# Patient Record
Sex: MALE | Race: BLACK OR AFRICAN AMERICAN | Employment: FULL TIME | URBAN - METROPOLITAN AREA
[De-identification: names, ages, dates, MRNs, and addresses within clinical notes are randomized per-mention and may not be internally consistent; named-entity substitution may affect disease eponyms.]

---

## 2017-11-16 ENCOUNTER — ED HISTORICAL/CONVERTED ENCOUNTER (OUTPATIENT)
Dept: OTHER | Age: 32
End: 2017-11-16

## 2017-12-12 ENCOUNTER — HOSPITAL ENCOUNTER (OUTPATIENT)
Dept: GENERAL RADIOLOGY | Age: 32
Discharge: HOME | End: 2017-12-12
Attending: INTERNAL MEDICINE
Payer: SUBSIDIZED

## 2017-12-12 DIAGNOSIS — K59.00 OBSTIPATION: ICD-10-CM

## 2017-12-12 DIAGNOSIS — K21.9 GERD (GASTROESOPHAGEAL REFLUX DISEASE): ICD-10-CM

## 2017-12-12 PROCEDURE — 74241 XR UPPER GI SERIES W KUB: CPT

## 2018-05-02 ENCOUNTER — HOSPITAL ENCOUNTER (OUTPATIENT)
Dept: GENERAL RADIOLOGY | Age: 33
Discharge: HOME | End: 2018-05-02
Attending: INTERNAL MEDICINE
Payer: SUBSIDIZED

## 2018-05-02 DIAGNOSIS — K59.00 OBSTIPATION: ICD-10-CM

## 2018-05-02 PROCEDURE — 74018 RADEX ABDOMEN 1 VIEW: CPT

## 2018-05-21 NOTE — H&P
Date: 2018 2:45 PM   Patient Name: Natali Lynne. Account #: [id number]    Gender: Male    (age): 1985 (32)       Provider:     Nahomy Webb MD        Referring Physician:     Ivan Saravia  [address], [place], [address]  [phone] (phone)  [phone] (fax)        Chief Complaint: abnormal/increased LFT's           History of Present Illness: The patient is seen for the evaluation of abnormal serum liver testing. Abnormal liver enzyme values were initially identified during the evaluation of related symptoms 2 months ago. The lab values were as follows: SGOT = 60, SGPT = 76, T. Bilirubin = 0.7, Albumin = 4.6. Pertinent symptoms reported by the patient include testicular pain. Finds difficult still to produce bowel movement in spite of daily PEG 3350 use. New complaint testicular pain       Told on follow up testing, liver enzymes back to normal      Having variable amounts of BRBPR with difficult to produce bowel movements? The patient is seen for the evaluation of abnormal serum liver testing. ? Abnormal liver enzyme values were initially identified ? during the evaluation of related symptoms? ? ? ?2? ? months? ago? Lucy Gardner The lab values were as follows: ? SGOT = ?60? ? ?, SGPT = ?76? ? ?, ALK Phos. = ?[ALK Phos. (units/ml. )]? ? ?, T. Bilirubin = ?0.7? , ? ? GGT = ?[GGT units/ml]? ? ?Albumin = ?4.6? ? ? Prothrombin time (INR) =  ?[Prothrombin time (INR)]? ?.? Pertinent symptoms reported by the patient include ? testicular pain? . ? ? The patient identifies ? [Hepatitis risk factors]? as increasing the risk for contraction of hepatitis. ? ? Additional notable medical history includes ? [Abnormal LFT associated conditions]? .? ? To this point, evaluation of the patient's lab abnormalities has included ? [Prior liver tests]? . ? Finds difficult still to produce bowel movement in spite of daily PEG 3350 use. New complaint testicular pain?        Told on follow up testing, liver enzymes back to normal      Having variable amounts of BRBPR with difficult to produce bowel movements      Past Medical History      Medical Conditions: No Known Conditions  depression   Surgical Procedures: No Prior Procedures   Dx Studies: Colonoscopy  Hemoccult cards, 12/26/2017  KUB, 12/12/2017   Medications: dicyclomine 20 mg TAKE ONE TABLET BY MOUTH FOUR TIMES A DAY FOR 7 DAYS  Gas Relief 80 mg  IBgard 90 mg  omeprazole 20 mg  polyethylene glycol 3350 17 gram/dose Take 17 gram dissolved in water once a day for 30 days   Allergies: Patient has no known allergies or drug allergies   Immunizations: No Immunizations      Social History      Alcohol: None   Tobacco: Former smoker   Drugs: Former cocaine / marijuana. Exercise: None   Caffeine: None   Marital Status:          Occupation:               Family History No history of Colon Cancer, Colon Polyps, Esophogeal Cancer, GI Cancers, IBD (Crohn's or UC), Liver disease  Other: Diagnosed with Primary malignant neoplasm of prostate; Father: Diagnosed with Cirrhosis of liver;       Review of Systems:   Cardiovascular: Denies chest pain, irregular heart beat, palpitations, peripheral edema, syncope, Sweats. Constitutional: Presents suffers from fatigue. Denies fever, loss of appetite, weight gain, weight loss. ENMT: Denies nose bleeds, sore throat, hearing loss. Endocrine: Denies excessive thirst, heat intolerance. Eyes: Denies loss of vision. Gastrointestinal: Presents suffers from abdominal pain, abdominal swelling, change in bowel habits, constipation, diarrhea, Bloating/gas, nausea, rectal bleeding, stomach cramps, rectal pain. Denies heartburn, jaundice, vomiting, dysphagia, Stool incontinence, hematemesis. Genitourinary: Denies dark urine, dysuria, frequent urination, hematuria, incontinence. Hematologic/Lymphatic: Denies easy bruising, prolonged bleeding. Integumentary: Denies itching, rashes, sun sensitivity.    Musculoskeletal: Presents suffers from back pain, muscle weakness. Denies arthritis, gout, joint pain, stiffness. Neurological: Presents suffers from dizziness, frequent headaches. Denies fainting, memory loss. Psychiatric: Denies anxiety, depression, difficulty sleeping, hallucinations, nervousness, panic attacks, paranoia. Respiratory: Presents suffers from dyspnea. Denies cough, wheezing. Vital Signs:   BP  (mmHg)  Pulse  (ppm) Weight (lbs/oz) Height (ft/in) BMI Resp/min Temp   117/84 76 214 / 4 5 / 7 33.55 13 98.3 (F)      Physical Exam:   Constitutional:      Appearance: No distress, appears comfortable. Skin:      Inspection: No rash, no jaundice. Head/face: Inspection: Normacephalic, atraumatic. Eyes:      Conjunctivae/lids: Normal.   ENMT:      External: Normal.   Neck:      Neck: Normal appearance, trachea midline. Respiratory:      Effort: Normal respiratory effort, comfortable, speaks in complete sentences. Auscultation: normal breath sounds, no rubs, wheezes or rhonchi. Cardiovascular: Auscultation: normal, S1 and S2, no gallops , no rubs or murmurs . Gastrointestinal/Abdomen:      Abdomen: non-distended, nontender. Liver/Spleen: normal, normal size, Liver size and consistency normal, spleen is non-palpable. Rectal: no fissure, normal testes. Musculoskeletal:      Gait/station: normal.   Muscles: normal strength and tone, no atrophy or abnormal movements. Psychiatric:      Judgment/insight: Normal, normal judgement, normal insight. Mood and affect: Normal mood, affect full, no evidence of depression, anxiety or agitation. Lymphatic:      Neck: No lymphadenopathy in the cervical or supraclavicular chain. Lab Results: No Electronic Results      Impressions: Obstipation? ?Obstipation?          Assessment: ?         Plan: Barium Enema  Renew polyethylene glycol 3350 17 gram/dose Take 17 gram dissolved in water twice a day for 30 days  bisacodyl 5 mg Take 2 tablet by mouth once a day for 30 days? ?Barium Enema? ? ? Renew ?polyethylene glycol 3350?17 gram/dose? Take 17 gram dissolved in water twice a day for 30 days? ?  ? ?bisacodyl? ?5 mg? ? Take 2 tablet by mouth once a day for 30 days? ? Risk & Medical Necessity: The patient requires Low to Moderate Severity care for this visit. Diagnosis and management options are Minimal. The amount of data reviewed and/or ordered is Minimal/None.  The level of risk is Minimal.           Notes:              Linda Joseph MD     Electronically signed on 2018 3:48:07 PM by MD Mavis James, MRN [id number],  1985 Follow Up, 2018

## 2018-05-22 ENCOUNTER — HOSPITAL ENCOUNTER (OUTPATIENT)
Age: 33
Setting detail: OUTPATIENT SURGERY
Discharge: HOME | End: 2018-05-22
Attending: SPECIALIST | Admitting: SPECIALIST
Payer: SUBSIDIZED

## 2018-05-22 ENCOUNTER — ANESTHESIA EVENT (OUTPATIENT)
Dept: ENDOSCOPY | Age: 33
End: 2018-05-22
Payer: SUBSIDIZED

## 2018-05-22 ENCOUNTER — ANESTHESIA (OUTPATIENT)
Dept: ENDOSCOPY | Age: 33
End: 2018-05-22
Payer: SUBSIDIZED

## 2018-05-22 VITALS
OXYGEN SATURATION: 100 % | WEIGHT: 209 LBS | RESPIRATION RATE: 16 BRPM | DIASTOLIC BLOOD PRESSURE: 81 MMHG | SYSTOLIC BLOOD PRESSURE: 118 MMHG | HEIGHT: 67 IN | HEART RATE: 71 BPM | BODY MASS INDEX: 32.8 KG/M2 | TEMPERATURE: 98.2 F

## 2018-05-22 PROCEDURE — 76040000019: Performed by: SPECIALIST

## 2018-05-22 PROCEDURE — 74011250636 HC RX REV CODE- 250/636: Performed by: PHYSICIAN ASSISTANT

## 2018-05-22 PROCEDURE — 74011000258 HC RX REV CODE- 258

## 2018-05-22 PROCEDURE — 76060000031 HC ANESTHESIA FIRST 0.5 HR: Performed by: SPECIALIST

## 2018-05-22 PROCEDURE — 74011000250 HC RX REV CODE- 250

## 2018-05-22 PROCEDURE — 74011250637 HC RX REV CODE- 250/637: Performed by: PHYSICIAN ASSISTANT

## 2018-05-22 PROCEDURE — 74011250636 HC RX REV CODE- 250/636

## 2018-05-22 RX ORDER — NALOXONE HYDROCHLORIDE 0.4 MG/ML
0.4 INJECTION, SOLUTION INTRAMUSCULAR; INTRAVENOUS; SUBCUTANEOUS
Status: DISCONTINUED | OUTPATIENT
Start: 2018-05-22 | End: 2018-05-22 | Stop reason: HOSPADM

## 2018-05-22 RX ORDER — PROPOFOL 10 MG/ML
INJECTION, EMULSION INTRAVENOUS AS NEEDED
Status: DISCONTINUED | OUTPATIENT
Start: 2018-05-22 | End: 2018-05-22 | Stop reason: HOSPADM

## 2018-05-22 RX ORDER — LIDOCAINE HYDROCHLORIDE 20 MG/ML
INJECTION, SOLUTION EPIDURAL; INFILTRATION; INTRACAUDAL; PERINEURAL AS NEEDED
Status: DISCONTINUED | OUTPATIENT
Start: 2018-05-22 | End: 2018-05-22 | Stop reason: HOSPADM

## 2018-05-22 RX ORDER — DEXTROMETHORPHAN/PSEUDOEPHED 2.5-7.5/.8
1.2 DROPS ORAL
Status: DISCONTINUED | OUTPATIENT
Start: 2018-05-22 | End: 2018-05-22 | Stop reason: HOSPADM

## 2018-05-22 RX ORDER — POLYETHYLENE GLYCOL 3350 17 G/17G
17 POWDER, FOR SOLUTION ORAL DAILY
COMMUNITY
End: 2018-05-22

## 2018-05-22 RX ORDER — EPINEPHRINE 0.1 MG/ML
1 INJECTION INTRACARDIAC; INTRAVENOUS
Status: DISCONTINUED | OUTPATIENT
Start: 2018-05-22 | End: 2018-05-22 | Stop reason: HOSPADM

## 2018-05-22 RX ORDER — DICYCLOMINE HYDROCHLORIDE 10 MG/1
10 CAPSULE ORAL
COMMUNITY
End: 2020-09-24 | Stop reason: ALTCHOICE

## 2018-05-22 RX ORDER — SODIUM CHLORIDE 9 MG/ML
INJECTION, SOLUTION INTRAVENOUS
Status: DISCONTINUED | OUTPATIENT
Start: 2018-05-22 | End: 2018-05-22 | Stop reason: HOSPADM

## 2018-05-22 RX ORDER — SODIUM CHLORIDE 9 MG/ML
50 INJECTION, SOLUTION INTRAVENOUS CONTINUOUS
Status: DISCONTINUED | OUTPATIENT
Start: 2018-05-22 | End: 2018-05-22 | Stop reason: HOSPADM

## 2018-05-22 RX ORDER — MIDAZOLAM HYDROCHLORIDE 1 MG/ML
.25-5 INJECTION, SOLUTION INTRAMUSCULAR; INTRAVENOUS AS NEEDED
Status: DISCONTINUED | OUTPATIENT
Start: 2018-05-22 | End: 2018-05-22 | Stop reason: HOSPADM

## 2018-05-22 RX ORDER — FENTANYL CITRATE 50 UG/ML
25 INJECTION, SOLUTION INTRAMUSCULAR; INTRAVENOUS AS NEEDED
Status: DISCONTINUED | OUTPATIENT
Start: 2018-05-22 | End: 2018-05-22 | Stop reason: HOSPADM

## 2018-05-22 RX ORDER — PROPOFOL 10 MG/ML
INJECTION, EMULSION INTRAVENOUS
Status: DISCONTINUED | OUTPATIENT
Start: 2018-05-22 | End: 2018-05-22 | Stop reason: HOSPADM

## 2018-05-22 RX ORDER — ATROPINE SULFATE 0.1 MG/ML
0.5 INJECTION INTRAVENOUS
Status: DISCONTINUED | OUTPATIENT
Start: 2018-05-22 | End: 2018-05-22 | Stop reason: HOSPADM

## 2018-05-22 RX ORDER — FLUMAZENIL 0.1 MG/ML
0.2 INJECTION INTRAVENOUS
Status: DISCONTINUED | OUTPATIENT
Start: 2018-05-22 | End: 2018-05-22 | Stop reason: HOSPADM

## 2018-05-22 RX ADMIN — SODIUM CHLORIDE 50 ML/HR: 900 INJECTION, SOLUTION INTRAVENOUS at 07:55

## 2018-05-22 RX ADMIN — PROPOFOL 100 MG: 10 INJECTION, EMULSION INTRAVENOUS at 09:08

## 2018-05-22 RX ADMIN — LIDOCAINE HYDROCHLORIDE 50 MG: 20 INJECTION, SOLUTION EPIDURAL; INFILTRATION; INTRACAUDAL; PERINEURAL at 09:08

## 2018-05-22 RX ADMIN — SIMETHICONE 80 MG: 20 SUSPENSION/ DROPS ORAL at 09:14

## 2018-05-22 RX ADMIN — SODIUM CHLORIDE: 9 INJECTION, SOLUTION INTRAVENOUS at 08:59

## 2018-05-22 RX ADMIN — PROPOFOL 50 MG: 10 INJECTION, EMULSION INTRAVENOUS at 09:10

## 2018-05-22 RX ADMIN — PROPOFOL 150 MCG/KG/MIN: 10 INJECTION, EMULSION INTRAVENOUS at 09:08

## 2018-05-22 NOTE — ANESTHESIA PREPROCEDURE EVALUATION
Anesthetic History   No history of anesthetic complications            Review of Systems / Medical History  Patient summary reviewed, nursing notes reviewed and pertinent labs reviewed    Pulmonary  Within defined limits                 Neuro/Psych   Within defined limits           Cardiovascular  Within defined limits                Exercise tolerance: >4 METS  Comments: Not on beta blocker   GI/Hepatic/Renal     GERD (Otc meds work): well controlled           Endo/Other  Within defined limits           Other Findings   Comments: Blood in stool and abdominal pain (out of work since Nov)           Physical Exam    Airway  Mallampati: II  TM Distance: 4 - 6 cm  Neck ROM: normal range of motion   Mouth opening: Normal     Cardiovascular  Regular rate and rhythm,  S1 and S2 normal,  no murmur, click, rub, or gallop  Rhythm: regular  Rate: normal         Dental  No notable dental hx       Pulmonary  Breath sounds clear to auscultation               Abdominal  GI exam deferred       Other Findings            Anesthetic Plan    ASA: 1  Anesthesia type: MAC            Anesthetic plan and risks discussed with: Patient

## 2018-05-22 NOTE — DISCHARGE INSTRUCTIONS
[facility] KENDY Marie MD  [phone]      May 22, 2018    Zach Lebron  YOB: 1985    COLONOSCOPY DISCHARGE INSTRUCTIONS    If there is redness at IV site you should apply warm compress to area. If redness or soreness persist contact Dr. Jorge Marie' or your primary care doctor. There may be a slight amount of blood passed from the rectum. Gaseous discomfort may develop, but walking, belching will help relieve this. You may not operate a vehicle for 12 hours  You may not operate machinery or dangerous appliances for rest of today  You may not drink alcoholic beverages for 12 hours  Avoid making any critical decisions for 24 hours    DIET:  You may resume your normal diet, but some patients find that heavy or large meals may lead to indigestion or vomiting. I suggest a light meal as first food intake. MEDICATIONS:  The use of some over-the-counter pain medication may lead to bleeding after colon biopsies or polyp removal.  Tylenol (also called acetaminophen) is safe to take even if you have had colonoscopy with polyp removal.  Based on the procedure you had today you may safely take aspirin or aspirin-like products for the next ten (10) days. Remember that Tylenol (also called acetaminophen) is safe to take after colonoscopy even if you have had biopsies or polyps removed. ACTIVITY:  You may resume your normal household activities, but it is recommended that you spend the remainder of the day resting -  avoid any strenuous activity.     CALL DR. Esperanza Rodríguez' OFFICE IF:  Increasing pain, nausea, vomiting  Abdominal distension (swelling)  Significant new or increased bleeding (oral or rectal)  Fever/Chills  Chest pain/shortness of breath                       Additional instructions:   Normal colonoscopy aside from small hemorrhoids  Will get a CT unless that's already been done  You should take fiber (Not miralax) and continue the bentyl medication for pain. It was an honor to be your doctor today. Please let me or my office staff know if you have any feedback about today's procedure. Mandy Dickerson MD    Colonoscopy saves lives, and can prevent colon cancer. Everyone aged 48 or older needs colonoscopy.   Tell your family and friends: get the test!

## 2018-05-22 NOTE — IP AVS SNAPSHOT
Summary of Care Report The Summary of Care report has been created to help improve care coordination. Users with access to [employer] or [address] (Web-based application) may access additional patient information including the Discharge Summary. If you are not currently a [address] user and need more information, please call the number listed below in the [place] [address] section and ask to be connected with Medical Records. Facility Information Name Address Phone [address] [id number] [phone] Patient Information Patient Name Sex  Josiah Robert ([id number]) Male 1985 Discharge Information Admitting Provider Service Area Unit Anu Pederson MD / [facility] Endoscopy / [phone] Discharge Provider Discharge Date/Time Discharge Disposition Destination (none) (none) (none) (none) Patient Language Language ENGLISH [13] You are allergic to the following No active allergies Current Discharge Medication List  
  
START taking these medications Dose & Instructions Dispensing Information Comments  
 psyllium packet Commonly known as:  METAMUCIL Dose:  1 Packet Take 1 Packet by mouth daily. Quantity:  90 Packet Refills:  3 CONTINUE these medications which have NOT CHANGED Dose & Instructions Dispensing Information Comments BENTYL 10 mg capsule Generic drug:  dicyclomine Dose:  10 mg Take 10 mg by mouth 4 times daily (before meals and nightly). Refills:  0  
   
 docusate sodium 50 mg capsule Commonly known as:  Sharon Booze Dose:  50 mg Take 50 mg by mouth two (2) times a day. Refills:  0 STOP taking these medications Comments MIRALAX 17 gram packet Generic drug:  polyethylene glycol Surgery Information ID Date/Time Status Primary Surgeon All Procedures Location [id number] 5/22/2018 0830 Unposted Kaitlynn Ruiz MD COLONOSCOPY [facility] ENDOSCOPY Follow-up Information None Discharge Instructions [address] Ulices Mariscal, [address] [place] [address] 
[phone] May 22, 2018 [facility] YOB: 1985 COLONOSCOPY DISCHARGE INSTRUCTIONS If there is redness at IV site you should apply warm compress to area. If redness or soreness persist contact Dr. Ulices Mariscal' or your primary care doctor. There may be a slight amount of blood passed from the rectum. Gaseous discomfort may develop, but walking, belching will help relieve this. You may not operate a vehicle for 12 hours You may not operate machinery or dangerous appliances for rest of today You may not drink alcoholic beverages for 12 hours Avoid making any critical decisions for 24 hours DIET: 
You may resume your normal diet, but some patients find that heavy or large meals may lead to indigestion or vomiting. I suggest a light meal as first food intake. MEDICATIONS: 
The use of some over-the-counter pain medication may lead to bleeding after colon biopsies or polyp removal.  Tylenol (also called acetaminophen) is safe to take even if you have had colonoscopy with polyp removal.  Based on the procedure you had today you may safely take aspirin or aspirin-like products for the next ten (10) days. Remember that Tylenol (also called acetaminophen) is safe to take after colonoscopy even if you have had biopsies or polyps removed. ACTIVITY: 
You may resume your normal household activities, but it is recommended that you spend the remainder of the day resting -  avoid any strenuous activity. CALL DR. Latrice Alfonso' OFFICE IF: Increasing pain, nausea, vomiting Abdominal distension (swelling) Significant new or increased bleeding (oral or rectal) Fever/Chills Chest pain/shortness of breath Additional instructions:  
Normal colonoscopy aside from small hemorrhoids Will get a CT unless that's already been done You should take fiber (Not miralax) and continue the bentyl medication for pain. It was an honor to be your doctor today. Please let me or my office staff know if you have any feedback about today's procedure. Charla Stovall MD 
 
Colonoscopy saves lives, and can prevent colon cancer. Everyone aged 48 or older needs colonoscopy. Tell your family and friends: get the test! 
 
 
 
 
Chart Review Routing History No Routing History on File

## 2018-05-22 NOTE — IP AVS SNAPSHOT
[address] [facility] [address] [phone] Patient: Rita Munguia MRN: [id number] ZYQ:[id number] About your hospitalization You were admitted on:  May 22, 2018 You last received care in the:  [facility] ENDOSCOPY You were discharged on:  May 22, 2018 Why you were hospitalized Your primary diagnosis was:  Not on File Follow-up Information None Discharge Orders None A check swathi indicates which time of day the medication should be taken. My Medications START taking these medications Instructions Each Dose to Equal  
 Morning Noon Evening Bedtime  
 psyllium packet Commonly known as:  METAMUCIL Your last dose was: Your next dose is: Take 1 Packet by mouth daily. 1 Packet CONTINUE taking these medications Instructions Each Dose to Equal  
 Morning Noon Evening Bedtime BENTYL 10 mg capsule Generic drug:  dicyclomine Your last dose was: Your next dose is: Take 10 mg by mouth 4 times daily (before meals and nightly). 10 mg  
    
   
   
   
  
 docusate sodium 50 mg capsule Commonly known as:  Lisa Busing Your last dose was: Your next dose is: Take 50 mg by mouth two (2) times a day. 50 mg  
    
   
   
   
  
  
STOP taking these medications MIRALAX 17 gram packet Generic drug:  polyethylene glycol Where to Get Your Medications Information on where to get these meds will be given to you by the nurse or doctor. ! Ask your nurse or doctor about these medications  
  psyllium packet Discharge Instructions [address] 
[phone] May 22, 2018 Rita Munguia YOB: 1985 COLONOSCOPY DISCHARGE INSTRUCTIONS If there is redness at IV site you should apply warm compress to area. If redness or soreness persist contact Dr. Vladimir Llamas' or your primary care doctor. There may be a slight amount of blood passed from the rectum. Gaseous discomfort may develop, but walking, belching will help relieve this. You may not operate a vehicle for 12 hours You may not operate machinery or dangerous appliances for rest of today You may not drink alcoholic beverages for 12 hours Avoid making any critical decisions for 24 hours DIET: 
You may resume your normal diet, but some patients find that heavy or large meals may lead to indigestion or vomiting. I suggest a light meal as first food intake. MEDICATIONS: 
The use of some over-the-counter pain medication may lead to bleeding after colon biopsies or polyp removal.  Tylenol (also called acetaminophen) is safe to take even if you have had colonoscopy with polyp removal.  Based on the procedure you had today you may safely take aspirin or aspirin-like products for the next ten (10) days. Remember that Tylenol (also called acetaminophen) is safe to take after colonoscopy even if you have had biopsies or polyps removed. ACTIVITY: 
You may resume your normal household activities, but it is recommended that you spend the remainder of the day resting -  avoid any strenuous activity. CALL DR. Ирина Lozano' OFFICE IF: Increasing pain, nausea, vomiting Abdominal distension (swelling) Significant new or increased bleeding (oral or rectal) Fever/Chills Chest pain/shortness of breath Additional instructions:  
Normal colonoscopy aside from small hemorrhoids Will get a CT unless that's already been done You should take fiber (Not miralax) and continue the bentyl medication for pain. It was an honor to be your doctor today. Please let me or my office staff know if you have any feedback about today's procedure. Jadon Velazquez MD 
 
Colonoscopy saves lives, and can prevent colon cancer. Everyone aged 48 or older needs colonoscopy. Tell your family and friends: get the test! 
 
 
 
 
  
  
  
Introducing [employer] 24/7 As a [employer] patient, I wanted to make you aware of our electronic visit tool called David Landrum. [employer] 24/7 allows you to connect within minutes with a medical provider 24 hours a day, seven days a week via a mobile device or tablet or logging into a secure website from your computer. You can access David Malloryfin from anywhere in the [place]. A virtual visit might be right for you when you have a simple condition and feel like you just dont want to get out of bed, or cant get away from work for an appointment, when your regular [employer] provider is not available (evenings, weekends or holidays), or when youre out of town and need minor care. Electronic visits cost only $49 and if the [employer] 24/7 provider determines a prescription is needed to treat your condition, one can be electronically transmitted to a nearby pharmacy*. Please take a moment to enroll today if you have not already done so. The enrollment process is free and takes just a few minutes. To enroll, please download the [employer] 24/7 sharyn to your tablet or phone, or visit [email or web address] to enroll on your computer. And, as an [address] [place] [address] patient with a [employer] account, the results of your visits will be scanned into your electronic medical record and your primary care provider will be able to view the scanned results. We urge you to continue to see your regular [employer] provider for your ongoing medical care. And while your primary care provider may not be the one available when you seek a David Landrum virtual visit, the peace of mind you get from getting a real diagnosis real time can be priceless. For more information on David Landrum, view our Frequently Asked Questions (FAQs) at [email or web address]. Sincerely, 
 
Shannon Boyce MD 
Chief Medical Officer [address] *:  certain medications cannot be prescribed via David Landrum Providers Seen During Your Hospitalization Provider Specialty Primary office phone Shanelle Vaughan MD Gastroenterology [phone] Your Primary Care Physician (PCP) Primary Care Physician Office Phone Office Fax NONE ** None ** ** None ** You are allergic to the following No active allergies Recent Documentation Height Weight BMI Smoking Status 1.702 m 94.8 kg 32.73 kg/m2 Former Smoker Emergency Contacts Name Discharge Info Relation Home Work Mobile Cassie Celestin DISCHARGE CAREGIVER [3] Spouse [3] [phone] Patient Belongings The following personal items are in your possession at time of discharge: 
  Dental Appliances: None  Visual Aid: None Please provide this summary of care documentation to your next provider. Signatures-by signing, you are acknowledging that this After Visit Summary has been reviewed with you and you have received a copy. Patient Signature:  ____________________________________________________________ Date:  ____________________________________________________________  
  
Jonas Cart Provider Signature:  ____________________________________________________________ Date:  ____________________________________________________________

## 2018-05-22 NOTE — INTERVAL H&P NOTE
Pre-Endoscopy H&P Update  Chief complaint/HPI/ROS:  The indication for the procedure, the patient's history and the patient's current medications are reviewed prior to the procedure and that data is reported on the H&P to which this document is attached. Any significant complaints with regard to organ systems will be noted, and if not mentioned then a review of systems is not contributory. History reviewed. No pertinent past medical history. Past Surgical History:   Procedure Laterality Date    HX OTHER SURGICAL      biopsy taken of left arm infection/     Social   Social History   Substance Use Topics    Smoking status: Former Smoker     Quit date: 2015    Smokeless tobacco: Never Used    Alcohol use No      Family History   Problem Relation Age of Onset    Heart Disease Mother       No Known Allergies   Prior to Admission Medications   Prescriptions Last Dose Informant Patient Reported? Taking?   dicyclomine (BENTYL) 10 mg capsule 5/21/2018  Yes Yes   Sig: Take 10 mg by mouth 4 times daily (before meals and nightly). docusate sodium (COLACE) 50 mg capsule 5/21/2018 at Unknown time  Yes Yes   Sig: Take 50 mg by mouth two (2) times a day. polyethylene glycol (MIRALAX) 17 gram packet 5/21/2018 at Unknown time  Yes Yes   Sig: Take 17 g by mouth daily. Facility-Administered Medications: None       PHYSICAL EXAM:  The patient is examined immediately prior to the procedure. Visit Vitals    /72    Pulse 78    Temp 97.8 °F (36.6 °C)    Resp 18    Ht 5' 7\" (1.702 m)    Wt 94.8 kg (209 lb)    SpO2 99%    BMI 32.73 kg/m2     Gen: Appears comfortable, no distress. Pulm: comfortable respirations with no abnormal audible breath sounds  CV: heart regular, well perfused  GI: abdomen flat. PLAN:  Informed consent discussion held, patient afforded an opportunity to ask questions and all questions answered.   After being advised of the risks, benefits, and alternatives, the patient requested that we proceed and indicated so on a written consent form. Will proceed with procedure as planned.   Cinthya Feng MD

## 2018-05-22 NOTE — ROUTINE PROCESS
Kristopher Saleh  1985  [id number]    Situation:  Verbal report received from: Iris Killian RN  Procedure: Procedure(s):  COLONOSCOPY    Background:    Preoperative diagnosis: CONSTIPATION  Postoperative diagnosis: hemorroids    :  Dr. Romana Jones  Assistant(s): Endoscopy Technician-1: Behzad Venegas  Endoscopy RN-1: Iris Killian RN    Specimens: * No specimens in log *  H. Pylori  no    Assessment:  Intra-procedure medications   Anesthesia gave intra-procedure sedation and medications, see anesthesia flow sheet yes    Intravenous fluids: NS@ KVO     Vital signs stable     Abdominal assessment: round and soft     Recommendation:  Discharge patient per MD order.   Family or Friend   Permission to share finding with family or friend yes

## 2018-05-22 NOTE — ANESTHESIA POSTPROCEDURE EVALUATION
Post-Anesthesia Evaluation and Assessment    Patient: Reece Almonte MRN: [id number]  SSN: [id number]    YOB: 1985  Age: 29 y.o. Sex: male       Cardiovascular Function/Vital Signs  Visit Vitals    /67    Pulse 85    Temp 36.8 °C (98.2 °F)    Resp 21    Ht 5' 7\" (1.702 m)    Wt 94.8 kg (209 lb)    SpO2 98%    BMI 32.73 kg/m2       Patient is status post MAC anesthesia for Procedure(s):  COLONOSCOPY. Nausea/Vomiting: None    Postoperative hydration reviewed and adequate. Pain:  Pain Scale 1: Numeric (0 - 10) (05/22/18 0925)  Pain Intensity 1: 0 (05/22/18 0925)   Managed    Neurological Status: At baseline    Mental Status and Level of Consciousness: Arousable    Pulmonary Status:   O2 Device: Room air (05/22/18 0925)   Adequate oxygenation and airway patent    Complications related to anesthesia: None    Post-anesthesia assessment completed.  No concerns    Signed By: Poli Darling MD     May 22, 2018

## 2018-05-22 NOTE — PROCEDURES
[address]  [phone]      May 22, 2018    Colonoscopy Procedure Note  Darrion Cadet  :  1985  [facility] Medical Record Number: [id number]    Indications:     Abdominal pain, LLQ, Abdominal pain, RLQ, Hematochezia/melena  PCP:  None  Anesthesia/Sedation: Conscious Sedation/Moderate Sedation  Endoscopist:  Dr. Natalie Morillo  Complications:  None  Estimated Blood Loss:  None    Permit:  The indications, risks, benefits and alternatives were reviewed with the patient or their decision maker who was provided an opportunity to ask questions and all questions were answered. The specific risks of colonoscopy with conscious sedation were reviewed, including but not limited to anesthetic complication, bleeding, adverse drug reaction, missed lesion, infection, IV site reactions, and intestinal perforation which would lead to the need for surgical repair. Alternatives to colonoscopy including radiographic imaging, observation without testing, or laboratory testing were reviewed including the limitations of those alternatives. After considering the options and having all their questions answered, the patient or their decision maker provided both verbal and written consent to proceed. Procedure in Detail:  After obtaining informed consent, positioning of the patient in the left lateral decubitus position, and conduction of a pre-procedure pause or \"time out\" the endoscope was introduced into the anus and advanced to the cecum, which was identified by the ileocecal valve and appendiceal orifice. The quality of the colonic preparation was good. A careful inspection was made as the colonoscope was withdrawn, findings and interventions are described below. Findings:   Normal terminal ileum. Normal colon - no inflammatory changes, no neoplastic changes.   Small internal hemorrhoids. Specimens:    none    Complications:   None; patient tolerated the procedure well. Impression:  Normal colonoscopy to the terminal ileum, with no evidence of neoplasia, diverticular disease, or mucosal abnormality. Recommendations:      - Ct will complete his abdominopelvic workup      - Try fiber and antispasmodic for abdominal pain. Thank you for entrusting me with this patient's care. Please do not hesitate to contact me with any questions or if I can be of assistance with any of your other patients' GI needs.     Signed By: Amalia Coyle MD                        May 22, 2018

## 2018-05-22 NOTE — PERIOP NOTES
Patient tolerated procedure without problems. Abdomen soft and patient arousable and voices no complaints Report received from CRNA, see anesthesia note. Patient transported to endoscopy recovery area.   Endoscope was pre-cleaned at bedside immediately following procedure by Naty PERALES

## 2020-09-14 PROBLEM — K21.9 GASTROESOPHAGEAL REFLUX DISEASE: Status: ACTIVE | Noted: 2018-06-25

## 2020-09-14 PROBLEM — K58.9 IRRITABLE BOWEL SYNDROME: Status: ACTIVE | Noted: 2018-06-25

## 2020-09-14 RX ORDER — MONTELUKAST SODIUM 10 MG/1
10 TABLET ORAL DAILY
COMMUNITY
End: 2021-02-11 | Stop reason: SDUPTHER

## 2020-09-14 RX ORDER — MINERAL OIL
ENEMA (ML) RECTAL
COMMUNITY
End: 2021-02-03 | Stop reason: SDUPTHER

## 2020-09-14 RX ORDER — ESOMEPRAZOLE MAGNESIUM 40 MG/1
CAPSULE, DELAYED RELEASE ORAL DAILY
COMMUNITY
End: 2020-10-23

## 2020-09-14 RX ORDER — FAMOTIDINE 20 MG/1
20 TABLET, FILM COATED ORAL
COMMUNITY
Start: 2018-06-25 | End: 2020-10-23 | Stop reason: CLARIF

## 2020-09-14 RX ORDER — HYDROXYZINE 25 MG/1
TABLET, FILM COATED ORAL
COMMUNITY
End: 2021-03-12

## 2020-09-15 ENCOUNTER — OFFICE VISIT (OUTPATIENT)
Dept: PRIMARY CARE CLINIC | Age: 35
End: 2020-09-15
Payer: MEDICAID

## 2020-09-15 VITALS
WEIGHT: 234 LBS | OXYGEN SATURATION: 98 % | HEART RATE: 86 BPM | HEIGHT: 66 IN | SYSTOLIC BLOOD PRESSURE: 128 MMHG | DIASTOLIC BLOOD PRESSURE: 80 MMHG | TEMPERATURE: 96.8 F | BODY MASS INDEX: 37.61 KG/M2 | RESPIRATION RATE: 20 BRPM

## 2020-09-15 DIAGNOSIS — R25.2 MUSCLE CRAMPS: Primary | ICD-10-CM

## 2020-09-15 PROCEDURE — 99213 OFFICE O/P EST LOW 20 MIN: CPT | Performed by: NURSE PRACTITIONER

## 2020-09-15 RX ORDER — CYCLOBENZAPRINE HCL 5 MG
5 TABLET ORAL
Qty: 30 TAB | Refills: 0 | Status: SHIPPED | OUTPATIENT
Start: 2020-09-15 | End: 2020-09-24 | Stop reason: ALTCHOICE

## 2020-09-15 NOTE — PROGRESS NOTES
Kelly Bhagat is a 30 y.o. male who presents to the office today for the following:    Chief Complaint   Patient presents with    Muscle Pain       History reviewed. No pertinent past medical history. Past Surgical History:   Procedure Laterality Date    COLONOSCOPY N/A 2018    COLONOSCOPY performed by Shalini Woody MD at [facility] HX OTHER SURGICAL      biopsy taken of left arm infection/        Family History   Problem Relation Age of Onset    Heart Disease Mother     Diabetes Mother     Liver Disease Father     Cancer Father         Social History     Tobacco Use    Smoking status: Former Smoker     Last attempt to quit:      Years since quittin.7    Smokeless tobacco: Never Used   Substance Use Topics    Alcohol use: No    Drug use: Not on file        HPI  Patient here with c/o muscle cramps in arms and legs over past few months. States that it usually occurs at end of day. Has been working a new job a [employer] and doing a lot more lifting than he was before. Also working out in heat. Has had similar problem in past and given muscle relaxants which helped. Current Outpatient Medications on File Prior to Visit   Medication Sig    famotidine (PEPCID) 20 mg tablet Take 20 mg by mouth.  esomeprazole (NEXIUM) 40 mg capsule Take  by mouth daily.  hydrOXYzine HCL (ATARAX) 25 mg tablet Take  by mouth three (3) times daily as needed.  montelukast (SINGULAIR) 10 mg tablet Take 10 mg by mouth daily.  fexofenadine (ALLEGRA) 180 mg tablet Take  by mouth.  dicyclomine (BENTYL) 10 mg capsule Take 10 mg by mouth 4 times daily (before meals and nightly).  docusate sodium (COLACE) 50 mg capsule Take 50 mg by mouth two (2) times a day.  psyllium (METAMUCIL) packet Take 1 Packet by mouth daily. No current facility-administered medications on file prior to visit.          Medications Ordered Today   Medications    cyclobenzaprine (FLEXERIL) 5 mg tablet     Sig: Take 1 Tab by mouth three (3) times daily as needed for Muscle Spasm(s). Dispense:  30 Tab     Refill:  0        Review of Systems   Constitutional: Negative. Eyes: Negative. Negative for blurred vision, photophobia, pain, discharge and redness. Respiratory: Negative. Cardiovascular: Negative. Negative for chest pain, palpitations, orthopnea, claudication and leg swelling. Gastrointestinal: Negative for abdominal pain, blood in stool, constipation, diarrhea, heartburn, nausea and vomiting. Genitourinary: Negative. Negative for dysuria, flank pain, frequency, hematuria and urgency. Musculoskeletal: Positive for myalgias. Negative for back pain, falls, joint pain and neck pain. Skin: Negative. Negative for itching and rash. Neurological: Negative. Negative for dizziness, tingling, tremors, loss of consciousness, weakness and headaches. Visit Vitals  /80 (BP 1 Location: Left arm, BP Patient Position: Sitting)   Pulse 86   Temp 96.8 °F (36 °C) (Tympanic)   Resp 20   Ht 5' 6\" (1.676 m)   Wt 234 lb (106.1 kg)   SpO2 98%   BMI 37.77 kg/m²       Physical Exam  Vitals signs and nursing note reviewed. Constitutional:       Appearance: Normal appearance. He is obese. HENT:      Mouth/Throat:      Pharynx: Oropharynx is clear. Cardiovascular:      Rate and Rhythm: Normal rate and regular rhythm. Pulses: Normal pulses. Heart sounds: Normal heart sounds. No murmur. Pulmonary:      Effort: Pulmonary effort is normal.      Breath sounds: Normal breath sounds. Abdominal:      General: Abdomen is flat. Bowel sounds are normal.      Palpations: Abdomen is soft. Musculoskeletal: Normal range of motion. Skin:     General: Skin is warm and dry. Neurological:      Mental Status: He is alert and oriented to person, place, and time. Mental status is at baseline.    Psychiatric:         Mood and Affect: Mood normal.         Behavior: Behavior normal.         Judgment: Judgment normal.         1. Muscle cramps  Check labs to ensure no abnormalities with electrolytes  Encouraged to drink plenty of fluids when working in heat and stretch daily  Will prescribe muscle relaxant to use prn (advised may cause drowsiness and do not drive/operate machinery when taking)  - CBC WITH AUTOMATED DIFF  - METABOLIC PANEL, COMPREHENSIVE  - MAGNESIUM  - cyclobenzaprine (FLEXERIL) 5 mg tablet; Take 1 Tab by mouth three (3) times daily as needed for Muscle Spasm(s). Dispense: 30 Tab; Refill: 0      Patient verbalizes understanding of plan of care as discussed above    Follow-up and Dispositions    · Return if symptoms worsen or fail to improve.

## 2020-09-15 NOTE — LETTER
NOTIFICATION RETURN TO WORK / SCHOOL 
 
9/15/2020 9:19 AM 
 
Mr. Sweetie Garcia [id number] To Whom It May Concern: 
 
Paulina De Souza is currently under the care of [facility]. He will return to work/school on:9/16/20 Unable to attend work on 9/11/20 and 9/14/20 If there are questions or concerns please have the patient contact our office. Sincerely, Jose Colin NP

## 2020-09-22 ENCOUNTER — APPOINTMENT (OUTPATIENT)
Dept: GENERAL RADIOLOGY | Age: 35
End: 2020-09-22
Attending: EMERGENCY MEDICINE
Payer: MEDICAID

## 2020-09-22 ENCOUNTER — HOSPITAL ENCOUNTER (EMERGENCY)
Age: 35
Discharge: HOME | End: 2020-09-23
Attending: EMERGENCY MEDICINE
Payer: MEDICAID

## 2020-09-22 DIAGNOSIS — E86.0 DEHYDRATION: ICD-10-CM

## 2020-09-22 DIAGNOSIS — T79.6XXA TRAUMATIC RHABDOMYOLYSIS, INITIAL ENCOUNTER (HCC): ICD-10-CM

## 2020-09-22 DIAGNOSIS — N17.9 AKI (ACUTE KIDNEY INJURY) (HCC): Primary | ICD-10-CM

## 2020-09-22 LAB
ALBUMIN SERPL-MCNC: 4.3 G/DL (ref 4–5)
ALBUMIN/GLOB SERPL: 1.6 {RATIO} (ref 1.2–2.2)
ALP SERPL-CCNC: 71 IU/L (ref 39–117)
ALT SERPL-CCNC: 31 IU/L (ref 0–44)
AST SERPL-CCNC: 35 IU/L (ref 0–40)
BASOPHILS # BLD AUTO: 0 X10E3/UL (ref 0–0.2)
BASOPHILS # BLD: 0.1 K/UL (ref 0–0.2)
BASOPHILS NFR BLD AUTO: 1 %
BASOPHILS NFR BLD: 1 % (ref 0–2.5)
BILIRUB SERPL-MCNC: <0.2 MG/DL (ref 0–1.2)
BUN SERPL-MCNC: 8 MG/DL (ref 6–20)
BUN/CREAT SERPL: 7 (ref 9–20)
CALCIUM SERPL-MCNC: 9.5 MG/DL (ref 8.7–10.2)
CHLORIDE SERPL-SCNC: 104 MMOL/L (ref 96–106)
CO2 SERPL-SCNC: 20 MMOL/L (ref 20–29)
CREAT SERPL-MCNC: 1.13 MG/DL (ref 0.76–1.27)
EOSINOPHIL # BLD AUTO: 0.1 X10E3/UL (ref 0–0.4)
EOSINOPHIL # BLD: 0.1 K/UL (ref 0–0.7)
EOSINOPHIL NFR BLD AUTO: 2 %
EOSINOPHIL NFR BLD: 1 % (ref 0.9–2.9)
ERYTHROCYTE [DISTWIDTH] IN BLOOD BY AUTOMATED COUNT: 14.1 % (ref 11.5–14.5)
ERYTHROCYTE [DISTWIDTH] IN BLOOD BY AUTOMATED COUNT: 14.1 % (ref 11.6–15.4)
GLOBULIN SER CALC-MCNC: 2.7 G/DL (ref 1.5–4.5)
GLUCOSE SERPL-MCNC: 90 MG/DL (ref 65–99)
HCT VFR BLD AUTO: 44.2 % (ref 37.5–51)
HCT VFR BLD AUTO: 47.7 % (ref 41–53)
HGB BLD-MCNC: 15.1 G/DL (ref 13–17.7)
HGB BLD-MCNC: 16.7 % (ref 13.5–17.5)
IMM GRANULOCYTES # BLD AUTO: 0 X10E3/UL (ref 0–0.1)
IMM GRANULOCYTES NFR BLD AUTO: 0 %
LYMPHOCYTES # BLD AUTO: 2.5 X10E3/UL (ref 0.7–3.1)
LYMPHOCYTES # BLD: 2.1 K/UL (ref 1–4.8)
LYMPHOCYTES NFR BLD AUTO: 36 %
LYMPHOCYTES NFR BLD: 17 % (ref 20.5–51.1)
MAGNESIUM SERPL-MCNC: 1.9 MG/DL (ref 1.6–2.3)
MCH RBC QN AUTO: 30.2 PG (ref 26.6–33)
MCH RBC QN AUTO: 31 PG (ref 31–34)
MCHC RBC AUTO-ENTMCNC: 34.2 G/DL (ref 31.5–35.7)
MCHC RBC AUTO-ENTMCNC: 34.9 G/DL (ref 31–36)
MCV RBC AUTO: 88 FL (ref 79–97)
MCV RBC AUTO: 88.7 FL (ref 80–100)
MONOCYTES # BLD AUTO: 0.6 X10E3/UL (ref 0.1–0.9)
MONOCYTES # BLD: 0.8 K/UL (ref 0.2–2.4)
MONOCYTES NFR BLD AUTO: 9 %
MONOCYTES NFR BLD: 6 % (ref 1.7–9.3)
NEUTROPHILS # BLD AUTO: 3.5 X10E3/UL (ref 1.4–7)
NEUTROPHILS NFR BLD AUTO: 52 %
NEUTS SEG # BLD: 9.3 K/UL (ref 1.8–7.7)
NEUTS SEG NFR BLD: 75 % (ref 42–75)
NRBC # BLD: 0.01 K/UL
NRBC BLD-RTO: 10 PER 100 WBC
PLATELET # BLD AUTO: 316 K/UL
PLATELET # BLD AUTO: 330 X10E3/UL (ref 150–450)
PMV BLD AUTO: 7.7 FL (ref 6.5–11.5)
POTASSIUM SERPL-SCNC: 5.1 MMOL/L (ref 3.5–5.2)
PROT SERPL-MCNC: 7 G/DL (ref 6–8.5)
RBC # BLD AUTO: 5 X10E6/UL (ref 4.14–5.8)
RBC # BLD AUTO: 5.38 M/UL (ref 4.5–5.9)
SODIUM SERPL-SCNC: 141 MMOL/L (ref 134–144)
WBC # BLD AUTO: 12.4 K/UL (ref 4.4–11.3)
WBC # BLD AUTO: 6.8 X10E3/UL (ref 3.4–10.8)

## 2020-09-22 PROCEDURE — 80053 COMPREHEN METABOLIC PANEL: CPT

## 2020-09-22 PROCEDURE — 36415 COLL VENOUS BLD VENIPUNCTURE: CPT

## 2020-09-22 PROCEDURE — 83690 ASSAY OF LIPASE: CPT

## 2020-09-22 PROCEDURE — 96374 THER/PROPH/DIAG INJ IV PUSH: CPT

## 2020-09-22 PROCEDURE — 96361 HYDRATE IV INFUSION ADD-ON: CPT

## 2020-09-22 PROCEDURE — 99284 EMERGENCY DEPT VISIT MOD MDM: CPT

## 2020-09-22 PROCEDURE — 83605 ASSAY OF LACTIC ACID: CPT

## 2020-09-22 PROCEDURE — 85025 COMPLETE CBC W/AUTO DIFF WBC: CPT

## 2020-09-22 RX ORDER — ONDANSETRON 2 MG/ML
4 INJECTION INTRAMUSCULAR; INTRAVENOUS
Status: COMPLETED | OUTPATIENT
Start: 2020-09-23 | End: 2020-09-23

## 2020-09-22 NOTE — Clinical Note
[address] 
[facility] EMERGENCY DEPT 
[address] 
[phone] Work/School Note Date: 9/22/2020 To Whom It May concern: 
 
[employer]nolph [employer] . was seen and treated today in the emergency room by the following provider(s): 
Attending Provider: Bubba Elam MD. Rose Marie Caceres is excused from work/school on 9/23/2020 through 9/26/2020. He is medically clear to return to work/school on 9/27/2020.   
  
 
Sincerely, 
 
 
 
 
Marlene Rodriguez MD

## 2020-09-22 NOTE — Clinical Note
[address] [facility] EMERGENCY DEPT 
[address] [place] 
[phone] Work/School Note Date: 2020 To Whom It May concern: 
 
Maurice Gaston Winkler was seen and treated today in the emergency room by the following provider(s): 
Attending Provider: Renee Giraldo MD. Salvatore Massey is excused from work/school on 20 and 20. He is medically clear to return to work/school on 2020.   
 
 
Sincerely, 
 
 
 
 
Liz Angulo MD

## 2020-09-23 ENCOUNTER — APPOINTMENT (OUTPATIENT)
Dept: GENERAL RADIOLOGY | Age: 35
End: 2020-09-23
Attending: EMERGENCY MEDICINE
Payer: MEDICAID

## 2020-09-23 VITALS
HEART RATE: 93 BPM | HEIGHT: 68 IN | WEIGHT: 230 LBS | BODY MASS INDEX: 34.86 KG/M2 | DIASTOLIC BLOOD PRESSURE: 88 MMHG | RESPIRATION RATE: 18 BRPM | TEMPERATURE: 98.2 F | SYSTOLIC BLOOD PRESSURE: 131 MMHG | OXYGEN SATURATION: 100 %

## 2020-09-23 LAB
ALBUMIN SERPL-MCNC: 5.4 G/DL (ref 3.5–5)
ALBUMIN/GLOB SERPL: 1.2 {RATIO} (ref 1.1–2.2)
ALP SERPL-CCNC: 93 U/L (ref 45–117)
ALT SERPL-CCNC: 55 U/L (ref 12–78)
AMPHET UR QL SCN: NEGATIVE
ANION GAP SERPL CALC-SCNC: 12 MMOL/L (ref 5–15)
ANION GAP SERPL CALC-SCNC: 17 MMOL/L (ref 5–15)
APPEARANCE UR: CLEAR
APPEARANCE UR: CLEAR
AST SERPL W P-5'-P-CCNC: 47 U/L (ref 15–37)
ATRIAL RATE: 116 BPM
BACTERIA URNS QL MICRO: ABNORMAL /HPF
BACTERIA URNS QL MICRO: NEGATIVE /HPF
BARBITURATES UR QL SCN: NEGATIVE
BENZODIAZ UR QL: NEGATIVE
BILIRUB SERPL-MCNC: 0.8 MG/DL (ref 0.2–1)
BILIRUB UR QL CFM: NEGATIVE
BILIRUB UR QL CFM: NEGATIVE
BUN SERPL-MCNC: 24 MG/DL (ref 6–20)
BUN SERPL-MCNC: 24 MG/DL (ref 6–20)
BUN/CREAT SERPL: 14 (ref 12–20)
BUN/CREAT SERPL: 8 (ref 12–20)
CA-I BLD-MCNC: 10.9 MG/DL (ref 8.5–10.1)
CA-I BLD-MCNC: 8.5 MG/DL (ref 8.5–10.1)
CALCULATED P AXIS, ECG09: 26 DEGREES
CALCULATED R AXIS, ECG10: 14 DEGREES
CALCULATED T AXIS, ECG11: 40 DEGREES
CANNABINOIDS UR QL SCN: POSITIVE
CHLORIDE SERPL-SCNC: 102 MMOL/L (ref 97–108)
CHLORIDE SERPL-SCNC: 92 MMOL/L (ref 97–108)
CK SERPL-CCNC: 683 U/L (ref 39–308)
CK SERPL-CCNC: 842 U/L (ref 39–308)
CO2 SERPL-SCNC: 26 MMOL/L (ref 21–32)
CO2 SERPL-SCNC: 26 MMOL/L (ref 21–32)
COCAINE UR QL SCN: NEGATIVE
COLOR UR: ABNORMAL
COLOR UR: ABNORMAL
CREAT SERPL-MCNC: 1.77 MG/DL (ref 0.7–1.3)
CREAT SERPL-MCNC: 2.89 MG/DL (ref 0.7–1.3)
DIAGNOSIS, 93000: NORMAL
DRUG SCRN COMMENT,DRGCM: ABNORMAL
ECSTASY, ECST: NEGATIVE
GLOBULIN SER CALC-MCNC: 4.6 G/DL (ref 2–4)
GLUCOSE SERPL-MCNC: 128 MG/DL (ref 65–100)
GLUCOSE SERPL-MCNC: 92 MG/DL (ref 65–100)
GLUCOSE UR STRIP.AUTO-MCNC: NEGATIVE MG/DL
GLUCOSE UR STRIP.AUTO-MCNC: NEGATIVE MG/DL
HGB UR QL STRIP: ABNORMAL
HGB UR QL STRIP: ABNORMAL
KETONES UR QL STRIP.AUTO: 15 MG/DL
KETONES UR QL STRIP.AUTO: ABNORMAL MG/DL
LACTATE SERPL-SCNC: 1.3 MMOL/L (ref 0.4–2)
LACTATE SERPL-SCNC: 3.1 MMOL/L (ref 0.4–2)
LEUKOCYTE ESTERASE UR QL STRIP.AUTO: NEGATIVE
LEUKOCYTE ESTERASE UR QL STRIP.AUTO: NEGATIVE
LIPASE SERPL-CCNC: 143 U/L (ref 73–393)
METHADONE UR QL: NEGATIVE
NITRITE UR QL STRIP.AUTO: NEGATIVE
NITRITE UR QL STRIP.AUTO: NEGATIVE
OPIATES UR QL: NEGATIVE
P-R INTERVAL, ECG05: 157 MS
PCP UR QL: NEGATIVE
PH UR STRIP: 5.5 [PH] (ref 5–8)
PH UR STRIP: 6 [PH] (ref 5–8)
POTASSIUM SERPL-SCNC: 3.5 MMOL/L (ref 3.5–5.1)
POTASSIUM SERPL-SCNC: 4 MMOL/L (ref 3.5–5.1)
PROT SERPL-MCNC: 10 G/DL (ref 6.4–8.2)
PROT UR STRIP-MCNC: 100 MG/DL
PROT UR STRIP-MCNC: >300 MG/DL
Q-T INTERVAL, ECG07: 319 MS
QRS DURATION, ECG06: 80 MS
QTC CALCULATION (BEZET), ECG08: 444 MS
RBC #/AREA URNS HPF: ABNORMAL /HPF (ref 0–3)
RBC #/AREA URNS HPF: NORMAL /HPF (ref 0–3)
SODIUM SERPL-SCNC: 135 MMOL/L (ref 136–145)
SODIUM SERPL-SCNC: 140 MMOL/L (ref 136–145)
SP GR UR REFRACTOMETRY: >1.03 (ref 1–1.03)
SP GR UR REFRACTOMETRY: >1.03 (ref 1–1.03)
UROBILINOGEN UR QL STRIP.AUTO: 1 EU/DL (ref 0.2–1)
UROBILINOGEN UR QL STRIP.AUTO: 1 EU/DL (ref 0.2–1)
VENTRICULAR RATE, ECG03: 116 BPM
WBC URNS QL MICRO: ABNORMAL /HPF (ref 0–5)
WBC URNS QL MICRO: NORMAL /HPF (ref 0–5)

## 2020-09-23 PROCEDURE — 74022 RADEX COMPL AQT ABD SERIES: CPT

## 2020-09-23 PROCEDURE — 83605 ASSAY OF LACTIC ACID: CPT

## 2020-09-23 PROCEDURE — 80307 DRUG TEST PRSMV CHEM ANLYZR: CPT

## 2020-09-23 PROCEDURE — 74011250636 HC RX REV CODE- 250/636: Performed by: EMERGENCY MEDICINE

## 2020-09-23 PROCEDURE — 81001 URINALYSIS AUTO W/SCOPE: CPT

## 2020-09-23 PROCEDURE — 80048 BASIC METABOLIC PNL TOTAL CA: CPT

## 2020-09-23 PROCEDURE — 82550 ASSAY OF CK (CPK): CPT

## 2020-09-23 RX ORDER — SODIUM CHLORIDE 9 MG/ML
200 INJECTION, SOLUTION INTRAVENOUS CONTINUOUS
Status: DISCONTINUED | OUTPATIENT
Start: 2020-09-23 | End: 2020-09-23 | Stop reason: DRUGHIGH

## 2020-09-23 RX ORDER — SODIUM CHLORIDE 9 MG/ML
25 INJECTION, SOLUTION INTRAVENOUS CONTINUOUS
Status: DISCONTINUED | OUTPATIENT
Start: 2020-09-23 | End: 2020-09-23 | Stop reason: HOSPADM

## 2020-09-23 RX ORDER — ONDANSETRON HYDROCHLORIDE 8 MG/1
8 TABLET, FILM COATED ORAL
Qty: 12 TAB | Refills: 0 | Status: SHIPPED | OUTPATIENT
Start: 2020-09-23 | End: 2020-09-24 | Stop reason: ALTCHOICE

## 2020-09-23 RX ADMIN — ONDANSETRON 4 MG: 2 INJECTION INTRAMUSCULAR; INTRAVENOUS at 00:06

## 2020-09-23 RX ADMIN — SODIUM CHLORIDE 25 ML/HR: 9 INJECTION, SOLUTION INTRAVENOUS at 06:48

## 2020-09-23 RX ADMIN — SODIUM CHLORIDE 1000 ML: 9 INJECTION, SOLUTION INTRAVENOUS at 00:06

## 2020-09-23 RX ADMIN — SODIUM CHLORIDE 1000 ML: 9 INJECTION, SOLUTION INTRAVENOUS at 03:45

## 2020-09-23 RX ADMIN — SODIUM CHLORIDE 1000 ML: 9 INJECTION, SOLUTION INTRAVENOUS at 02:29

## 2020-09-23 NOTE — ED PROVIDER NOTES
Patient is a 72-year-old male complains of generalized muscle pain, nausea and vomiting started today. He reports severe cramping in his feet. Pain is worse with movement of the foot. He is actively vomiting in the ED. He reports similar symptoms found to be in acute kidney injury. He had to have 6 bags of IV fluid. He admits to marijuana use but denies any other drugs. History reviewed. No pertinent past medical history. Past Surgical History:   Procedure Laterality Date    COLONOSCOPY N/A 2018    COLONOSCOPY performed by Yoli Aden MD at [facility] HX OTHER SURGICAL      biopsy taken of left arm infection/         Family History:   Problem Relation Age of Onset    Heart Disease Mother     Diabetes Mother     Liver Disease Father     Cancer Father        Social History     Socioeconomic History    Marital status:      Spouse name: Not on file    Number of children: Not on file    Years of education: Not on file    Highest education level: Not on file   Occupational History    Not on file   Social Needs    Financial resource strain: Not on file    Food insecurity     Worry: Not on file     Inability: Not on file    Transportation needs     Medical: Not on file     Non-medical: Not on file   Tobacco Use    Smoking status: Current Every Day Smoker     Last attempt to quit: 2015     Years since quittin.7    Smokeless tobacco: Never Used    Tobacco comment: 3 pks/week   Substance and Sexual Activity    Alcohol use:  Yes    Drug use: Yes     Types: Marijuana    Sexual activity: Not on file   Lifestyle    Physical activity     Days per week: Not on file     Minutes per session: Not on file    Stress: Not on file   Relationships    Social connections     Talks on phone: Not on file     Gets together: Not on file     Attends Gnosticism service: Not on file     Active member of club or organization: Not on file     Attends meetings of clubs or organizations: Not on file     Relationship status: Not on file    Intimate partner violence     Fear of current or ex partner: Not on file     Emotionally abused: Not on file     Physically abused: Not on file     Forced sexual activity: Not on file   Other Topics Concern    Not on file   Social History Narrative    Not on file         ALLERGIES: Cashew nut; Egg derived; Onion; and Tomato    Review of Systems   Constitutional: Negative. HENT: Negative. Eyes: Negative. Respiratory: Negative. Cardiovascular: Negative. Gastrointestinal: Positive for nausea and vomiting. Endocrine: Negative. Genitourinary: Negative. Musculoskeletal: Positive for myalgias. Allergic/Immunologic: Negative. Neurological: Negative. Hematological: Negative. Psychiatric/Behavioral: Negative. Vitals:    09/22/20 2328   BP: 114/83   Pulse: (!) 118   Resp: 20   Temp: 98.1 °F (36.7 °C)   SpO2: 95%   Weight: 104.3 kg (230 lb)   Height: 5' 8\" (1.727 m)            Physical Exam  Constitutional:       General: He is in acute distress. HENT:      Head: Normocephalic and atraumatic. Nose: Nose normal.      Mouth/Throat:      Mouth: Mucous membranes are moist.   Eyes:      Extraocular Movements: Extraocular movements intact. Conjunctiva/sclera: Conjunctivae normal.      Pupils: Pupils are equal, round, and reactive to light. Neck:      Musculoskeletal: Normal range of motion and neck supple. Cardiovascular:      Rate and Rhythm: Normal rate and regular rhythm. Pulses: Normal pulses. Heart sounds: Normal heart sounds. Pulmonary:      Effort: Pulmonary effort is normal.      Breath sounds: Normal breath sounds. Abdominal:      General: Abdomen is flat. Bowel sounds are normal.      Palpations: Abdomen is soft. Musculoskeletal: Normal range of motion. Skin:     General: Skin is warm and dry. Neurological:      General: No focal deficit present.       Mental Status: He is oriented to person, place, and time. Psychiatric:         Mood and Affect: Mood normal.         Behavior: Behavior normal.          MDM  Number of Diagnoses or Management Options  KRISTEN (acute kidney injury) ([facility] [address]): new and requires workup  Dehydration: new and requires workup  Traumatic rhabdomyolysis, initial encounter [facility]): new and requires workup  Diagnosis management comments: Critical care 30 minutes consultation 10 minutes multiple labs rehydration treatment with medications and coordination with PCP    Risk of Complications, Morbidity, and/or Mortality  Presenting problems: high  Management options: high      ED Course as of Sep 23 0947   Wed Sep 23, 2020   0448 EKG, 12 LEAD, INITIAL [RA]   3941 Discussed case with Dr Lesly Lemus. He says check repeat CPK, if patient is able to take oral fluids and CPK is coming down, he can go home. [RA]   [phone] The last set of labs showed improvement in renal function with creatinine dropping to 1.77 from 2.8 and a elevation of his CPK now to 842. Strongly encouraged the patient to be admitted to the hospital for further hydration and following his renal function is insistent on being discharged to follow-up with his PCP Dr. Gavin Cueto or Davina Lim NP. Telephone consultation with Davina Lim reviewed the ED record and treatment and the patient's plans for follow-up with them today for repeat labs this afternoon. Patient understands he is at risk of declining renal function and possibly kidney failure requiring dialysis and still insisted on being discharged he understands he may return anytime for reevaluation and further treatment. 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 [KS]      ED Course User Index  [KS] Sophie Welsh MD  [RA] Renee Giraldo MD     EKG: Sinus tachycardia, 116/min, normal axis, low P and UT interval, normal QRS, diffuse J-point elevation.     Procedures

## 2020-09-23 NOTE — PROGRESS NOTES
Please try to get in contact with patient as ED called me this am and he declined being admitted. These results are normal from 9/15/20 (not sure why they just resulted) but they were abnormal in ED and found to be in rhabdomyolysis.  He needs to schedule appointment asap to have these repeated as he was not admitted and need to ensure kidney function returns to normal.

## 2020-09-23 NOTE — ED NOTES
Report taken from netecia, rn. Patient lying on stretcher, resting comfortably, denies all complaints/needs at this time. Patient had 150ml of urine output. NS IV running KVO on pump at this time. IV site WDL.

## 2020-09-24 ENCOUNTER — TELEPHONE (OUTPATIENT)
Dept: PRIMARY CARE CLINIC | Age: 35
End: 2020-09-24

## 2020-09-24 ENCOUNTER — OFFICE VISIT (OUTPATIENT)
Dept: PRIMARY CARE CLINIC | Age: 35
End: 2020-09-24
Payer: MEDICAID

## 2020-09-24 VITALS
WEIGHT: 232 LBS | OXYGEN SATURATION: 99 % | TEMPERATURE: 97.6 F | BODY MASS INDEX: 37.28 KG/M2 | HEIGHT: 66 IN | DIASTOLIC BLOOD PRESSURE: 74 MMHG | RESPIRATION RATE: 20 BRPM | SYSTOLIC BLOOD PRESSURE: 121 MMHG | HEART RATE: 82 BPM

## 2020-09-24 DIAGNOSIS — N17.9 AKI (ACUTE KIDNEY INJURY) (HCC): ICD-10-CM

## 2020-09-24 DIAGNOSIS — M62.82 EXERTIONAL RHABDOMYOLYSIS: Primary | ICD-10-CM

## 2020-09-24 PROCEDURE — 99213 OFFICE O/P EST LOW 20 MIN: CPT | Performed by: NURSE PRACTITIONER

## 2020-09-24 PROCEDURE — 1111F DSCHRG MED/CURRENT MED MERGE: CPT | Performed by: NURSE PRACTITIONER

## 2020-09-24 NOTE — TELEPHONE ENCOUNTER
Pt called back and is making an appt to be seen today      ----- Message from Paradise Hassan LPN sent at 0/49/4578  3:39 PM EDT -----  Called pt no answer had to LVM to call the office      ----- Message -----  From: Sus[place]anna Chamber, NP  Sent: 9/23/2020   3:29 PM EDT  To: Paradise Hassan LPN    Please try to get in contact with patient as ED called me this am and he declined being admitted. These results are normal from 9/15/20 (not sure why they just resulted) but they were abnormal in ED and found to be in rhabdomyolysis.  He needs to schedule appointment asap to have these repeated as he was not admitted and need to ensure kidney function returns to normal.

## 2020-09-24 NOTE — PROGRESS NOTES
Nicki Nowak is a 30 y.o. male who presents to the office today for the following:    Chief Complaint   Patient presents with   [facility] Follow Up    Muscle Pain    Acute Kidney Injury       Past Medical History:   Diagnosis Date    GERD (gastroesophageal reflux disease)        Past Surgical History:   Procedure Laterality Date    COLONOSCOPY N/A 2018    COLONOSCOPY performed by Conchita Katz MD at [facility] OTHER SURGICAL      biopsy taken of left arm infection/        Family History   Problem Relation Age of Onset    Heart Disease Mother     Diabetes Mother     Liver Disease Father     Cancer Father         Social History     Tobacco Use    Smoking status: Current Every Day Smoker     Last attempt to quit:      Years since quittin.7    Smokeless tobacco: Never Used    Tobacco comment: 3 pks/week   Substance Use Topics    Alcohol use: Yes    Drug use: Yes     Types: Marijuana        HPI  Patient here for hospital follow up after diagnosed with rhabdomyolysis. Had come to clinic on 9/15/20 have muscle cramps and labs were ok. States that he was doing ok other than some mild muscle cramps and then suddenly on 20 started having severe muscle spasms and vomiting. He was given IVF and felt much better so requested to be discharged despite recommendation for admission. Was in ED however about 24 hours. States that he is having some muscle soreness today but again feels much improved. 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 This is the 3rd episode that required him to go to hospital but does report that he has had muscle cramps almost on a daily basis since early 20s but has just learned to \"deal\" with them over time. 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 Drinks lots of fluids and takes frequent rest breaks on daily basis as he has learned this helps reverse the cramping. Current Outpatient Medications on File Prior to Visit   Medication Sig    famotidine (PEPCID) 20 mg tablet Take 20 mg by mouth.  esomeprazole (NEXIUM) 40 mg capsule Take  by mouth daily.  hydrOXYzine HCL (ATARAX) 25 mg tablet Take  by mouth three (3) times daily as needed.  montelukast (SINGULAIR) 10 mg tablet Take 10 mg by mouth daily.  fexofenadine (ALLEGRA) 180 mg tablet Take  by mouth. No current facility-administered medications on file prior to visit. Review of Systems   Constitutional: Positive for malaise/fatigue. Negative for diaphoresis and fever. Respiratory: Negative. Cardiovascular: Negative. Gastrointestinal: Negative. Genitourinary: Negative. Musculoskeletal: Positive for joint pain and myalgias. Negative for back pain and falls. Neurological: Negative. Visit Vitals  /74 (BP 1 Location: Left arm, BP Patient Position: Sitting)   Pulse 82   Temp 97.6 °F (36.4 °C) (Tympanic)   Resp 20   Ht 5' 6\" (1.676 m)   Wt 232 lb (105.2 kg)   SpO2 99%   BMI 37.45 kg/m²       Physical Exam  Vitals signs and nursing note reviewed. Constitutional:       Appearance: Normal appearance. Cardiovascular:      Rate and Rhythm: Normal rate and regular rhythm. Pulses: Normal pulses. Heart sounds: Normal heart sounds. Pulmonary:      Effort: Pulmonary effort is normal.      Breath sounds: Normal breath sounds. Abdominal:      General: Bowel sounds are normal.      Palpations: Abdomen is soft. Musculoskeletal: Normal range of motion. General: Tenderness (generalized to muscles in upper and lower extremities) present. No deformity. Right lower leg: No edema. Left lower leg: No edema. Skin:     General: Skin is warm and dry.    Neurological: Mental Status: He is alert. Mental status is at baseline. 1. Exertional rhabdomyolysis    - CO DISCHARGE MEDS RECONCILED W/ CURRENT OUTPATIENT MED LIST  - CK  - REFERRAL TO NEUROLOGY    2. KRISTEN (acute kidney injury) ([address]    - METABOLIC PANEL, BASIC      Patient symptoms improved and repeating labs   CR down to 1.7 on discharge from 2.89    on discharge  He is drinking plenty of fluids as he refused admission but did receive IVF for about 24 hours in ED  Some continued soreness but no vomiting or cramping at this time  He will continue to rest and work note provided  Discuss further recommendations pending lab results  Going to refer to a recommended specialist [facility] to evaluate him further given recurrent rhabdo history and concern for potential metabolic myopathy. Patient verbalizes understanding of plan of care as discussed above    Visit lasted for 15 minutes with greater than 50% of time spent in discussion regarding hospitalization for KRISTEN and rhabdomyolysis    Follow-up and Dispositions    · Return if symptoms worsen or fail to improve.

## 2020-09-24 NOTE — LETTER
NOTIFICATION RETURN TO WORK / SCHOOL 
 
9/24/2020 3:00 PM 
 
Mr. Kassy Gabriel [place] [id number] To Whom It May Concern: 
 
Kelly Bhagat is currently under the care of [facility]. He will return to work/school on: 9/28/20 Kelly Bhagat may return to work/school with the following restrictions: No lifting more than 25 pounds. Allow breaks when needed up to 15 minutes. If there are questions or concerns please have the patient contact our office. Sincerely, Martina Oleary NP

## 2020-09-25 ENCOUNTER — TELEPHONE (OUTPATIENT)
Dept: RHEUMATOLOGY | Age: 35
End: 2020-09-25

## 2020-09-25 LAB
BUN SERPL-MCNC: 12 MG/DL (ref 6–20)
BUN/CREAT SERPL: 12 (ref 9–20)
CALCIUM SERPL-MCNC: 9.2 MG/DL (ref 8.7–10.2)
CHLORIDE SERPL-SCNC: 100 MMOL/L (ref 96–106)
CK SERPL-CCNC: 1097 U/L (ref 49–439)
CO2 SERPL-SCNC: 21 MMOL/L (ref 20–29)
CREAT SERPL-MCNC: 1.01 MG/DL (ref 0.76–1.27)
GLUCOSE SERPL-MCNC: 80 MG/DL (ref 65–99)
POTASSIUM SERPL-SCNC: 4.3 MMOL/L (ref 3.5–5.2)
SODIUM SERPL-SCNC: 137 MMOL/L (ref 134–144)

## 2020-09-25 NOTE — PROGRESS NOTES
Please let patient know that kidney functions have returned to normal.   CK is high but this usually peaks around 72 hours and can re-check this in about a week as long as he continues to feel ok.   If starts feeling bad again or has any problems please let us know immediately  I am waiting to hear back for specialty office to decide where we want to refer

## 2020-09-25 NOTE — TELEPHONE ENCOUNTER
Spoke to Teresita Capellan informed Teresita Capellan per Dr Josephine Stern message below  This is not a patient that was seen.  I recommend seeing Dr. Alessandra Vogel at [facility] neurology if someone thinks that he has a metabolic myopathy.    Teresita Capellan verbally acknowledged understanding

## 2020-09-25 NOTE — TELEPHONE ENCOUNTER
----- Message from Emil Barney MD sent at 9/25/2020 10:59 AM EDT -----  Regarding: RE: Dr. Laron Mcdowell  This is not a patient that was seen. I recommend seeing Dr. Daniel Hudson at [facility] neurology if someone thinks that he has a metabolic myopathy.   ----- Message -----  From: Chiquita Stratton LPN  Sent: 4/82/6172   3:29 PM EDT  To: Emil Barney MD  Subject: FW: Dr. Cantu/Telephone                          Please advise  ----- Message -----  From: Peterson Bennett  Sent: 9/24/2020   3:20 PM EDT  To: [facility] Nurse Pool  Subject: Dr. Mauricio Schaefer Message/Vendor Calls  Caller's first and last name:Jenn([place] [id number])      Reason for call:doctor or nurse call back      Callback required yes/no and why:yes      Best contact number(s):[phone]      Details to clarify the request:Jenn requested a call back to advise if pt could be seen to determine if pt has\"metabolic myopathy\".       Danny Emmanuel

## 2020-09-28 ENCOUNTER — TELEPHONE (OUTPATIENT)
Dept: PRIMARY CARE CLINIC | Age: 35
End: 2020-09-28

## 2020-09-28 NOTE — TELEPHONE ENCOUNTER
Called pt and LVM of the results and to call us with any questions         ----- Message from Leatha Hassan LPN sent at 2/76/3677  8:38 AM EDT -----  Called pt no answer had to LVM to call the office      ----- Message -----  From: Wilton Jordan NP  Sent: 9/25/2020   8:35 AM EDT  To: Leatha Hassan LPN    Please let patient know that kidney functions have returned to normal.   CK is high but this usually peaks around 72 hours and can re-check this in about a week as long as he continues to feel ok.   If starts feeling bad again or has any problems please let us know immediately  I am waiting to hear back for specialty office to decide where we want to refer

## 2020-09-29 ENCOUNTER — OFFICE VISIT (OUTPATIENT)
Dept: PRIMARY CARE CLINIC | Age: 35
End: 2020-09-29
Payer: MEDICAID

## 2020-09-29 VITALS
DIASTOLIC BLOOD PRESSURE: 94 MMHG | HEART RATE: 86 BPM | RESPIRATION RATE: 18 BRPM | OXYGEN SATURATION: 99 % | SYSTOLIC BLOOD PRESSURE: 126 MMHG | TEMPERATURE: 97.7 F

## 2020-09-29 DIAGNOSIS — R06.83 HABITUAL SNORING: ICD-10-CM

## 2020-09-29 DIAGNOSIS — M62.82 EXERTIONAL RHABDOMYOLYSIS: Primary | ICD-10-CM

## 2020-09-29 PROCEDURE — 99213 OFFICE O/P EST LOW 20 MIN: CPT | Performed by: NURSE PRACTITIONER

## 2020-09-29 NOTE — LETTER
NOTIFICATION RETURN TO WORK / SCHOOL 
 
9/29/2020 8:51 AM 
 
Mr. Chencho Yousif [place] [id number] To Whom It May Concern: 
 
Saumya Candelario is currently under the care of [facility]. He will return to work/school on: pending follow up on 10/6/20 If there are questions or concerns please have the patient contact our office. Sincerely, Sophia Nunez NP

## 2020-09-29 NOTE — PROGRESS NOTES
Twan Ravi Sr. is a 30 y.o. male who presents to the office today for the following:    Chief Complaint   Patient presents with    Follow-up    Pain (Chronic)       Past Medical History:   Diagnosis Date    GERD (gastroesophageal reflux disease)        Past Surgical History:   Procedure Laterality Date    COLONOSCOPY N/A 2018    COLONOSCOPY performed by Leah Blanton MD at [facility] OTHER SURGICAL      biopsy taken of left arm infection/        Family History   Problem Relation Age of Onset    Heart Disease Mother     Diabetes Mother     Liver Disease Father     Cancer Father         Social History     Tobacco Use    Smoking status: Current Every Day Smoker     Last attempt to quit:      Years since quittin.7    Smokeless tobacco: Never Used    Tobacco comment: 3 pks/week   Substance Use Topics    Alcohol use: Yes    Drug use: Yes     Types: Marijuana        HPI  Patient here for follow up as he reports that he was feeling better after last appointment but started getting muscle cramps and locking again yesterday. States that he did not return to work and has been resting over the weekend. Has been trying to drink plenty of fluids. Does feel that locking today has stopped just feels more sore. Current Outpatient Medications on File Prior to Visit   Medication Sig    famotidine (PEPCID) 20 mg tablet Take 20 mg by mouth.  esomeprazole (NEXIUM) 40 mg capsule Take  by mouth daily.  hydrOXYzine HCL (ATARAX) 25 mg tablet Take  by mouth three (3) times daily as needed.  montelukast (SINGULAIR) 10 mg tablet Take 10 mg by mouth daily.  fexofenadine (ALLEGRA) 180 mg tablet Take  by mouth. No current facility-administered medications on file prior to visit. Review of Systems   Constitutional: Positive for malaise/fatigue. Negative for diaphoresis and fever. HENT: Negative for congestion, ear pain, sinus pain and sore throat.          Snoring Eyes: Negative. Respiratory: Negative. Negative for stridor. Cardiovascular: Negative. Gastrointestinal: Negative. Genitourinary: Negative. Musculoskeletal: Positive for joint pain and myalgias. Negative for back pain and falls. Neurological: Negative. Visit Vitals  BP (!) 126/94 (BP 1 Location: Left arm, BP Patient Position: Sitting)   Pulse 86   Temp 97.7 °F (36.5 °C) (Tympanic)   Resp 18   SpO2 99%       Physical Exam  Vitals signs and nursing note reviewed. Constitutional:       Appearance: Normal appearance. Cardiovascular:      Rate and Rhythm: Normal rate and regular rhythm. Pulses: Normal pulses. Heart sounds: Normal heart sounds. Pulmonary:      Effort: Pulmonary effort is normal.      Breath sounds: Normal breath sounds. Abdominal:      General: Bowel sounds are normal.      Palpations: Abdomen is soft. Musculoskeletal: Normal range of motion. General: Tenderness (generalized to muscles in upper and lower extremities) present. No deformity. Right lower leg: No edema. Left lower leg: No edema. Skin:     General: Skin is warm and dry. Neurological:      Mental Status: He is alert. Mental status is at baseline. 1. Exertional rhabdomyolysis  Patient symptoms were improved and now experiencing some cramping and locking up yesterday which is currently better in office but still \"sore\"  CR returned to normal on 9/24/20  CK 1097 but expected to peak   He is drinking plenty of fluids and received IVF but did refuse admission on 9/23/20 when seen in ED for rhabdo  He will continue to rest and work note provided  Repeating labs to ensure no change but advised if he develops locking again, he needs to go immediately to ED  Have referred him  to a recommended specialist at [facility] to evaluate him further given recurrent rhabdo history and concern for potential metabolic myopathy.  - METABOLIC PANEL, BASIC  - CK    2.  Habitual snoring  His prior sleep study was canceled due to covid 19 but would like to be set back up for this given habitual snoring, apnea reported by wife and daytime fatigue despite sleeping normal amount. Patient/wife verbalizes understanding of plan of care as discussed above    Follow-up and Dispositions    · Return in about 1 week (around 10/6/2020) for or sooner for worsening symptoms.

## 2020-09-30 ENCOUNTER — TELEPHONE (OUTPATIENT)
Dept: PRIMARY CARE CLINIC | Age: 35
End: 2020-09-30

## 2020-09-30 LAB
BUN SERPL-MCNC: 9 MG/DL (ref 6–20)
BUN/CREAT SERPL: 8 (ref 9–20)
CALCIUM SERPL-MCNC: 9.7 MG/DL (ref 8.7–10.2)
CHLORIDE SERPL-SCNC: 98 MMOL/L (ref 96–106)
CK SERPL-CCNC: 322 U/L (ref 49–439)
CO2 SERPL-SCNC: 28 MMOL/L (ref 20–29)
CREAT SERPL-MCNC: 1.18 MG/DL (ref 0.76–1.27)
GLUCOSE SERPL-MCNC: 89 MG/DL (ref 65–99)
POTASSIUM SERPL-SCNC: 4.4 MMOL/L (ref 3.5–5.2)
SODIUM SERPL-SCNC: 138 MMOL/L (ref 134–144)

## 2020-09-30 NOTE — TELEPHONE ENCOUNTER
called pt and informed them of the results. Pt stated he is doing a lot better today         ----- Message from Tahira Brown NP sent at 9/30/2020  8:59 AM EDT -----  Please let patient know that CK is down to 322 and is in normal range as well as kidney functions. Appears recovered still from rhabdo. See how he is doing today.

## 2020-09-30 NOTE — PROGRESS NOTES
Please let patient know that CK is down to 322 and is in normal range as well as kidney functions. Appears recovered still from rhabdo. See how he is doing today.

## 2020-10-23 ENCOUNTER — OFFICE VISIT (OUTPATIENT)
Dept: PRIMARY CARE CLINIC | Age: 35
End: 2020-10-23
Payer: MEDICAID

## 2020-10-23 VITALS
DIASTOLIC BLOOD PRESSURE: 82 MMHG | RESPIRATION RATE: 20 BRPM | HEART RATE: 98 BPM | TEMPERATURE: 97.7 F | OXYGEN SATURATION: 98 % | SYSTOLIC BLOOD PRESSURE: 145 MMHG

## 2020-10-23 DIAGNOSIS — M62.82 EXERTIONAL RHABDOMYOLYSIS: Primary | ICD-10-CM

## 2020-10-23 PROCEDURE — 99212 OFFICE O/P EST SF 10 MIN: CPT | Performed by: NURSE PRACTITIONER

## 2020-10-23 RX ORDER — ESOMEPRAZOLE MAGNESIUM 40 MG/1
CAPSULE, DELAYED RELEASE ORAL
Qty: 90 CAP | Refills: 0 | Status: SHIPPED | OUTPATIENT
Start: 2020-10-23 | End: 2021-01-18 | Stop reason: ALTCHOICE

## 2020-10-23 RX ORDER — HYOSCYAMINE SULFATE 0.38 MG/1
TABLET, EXTENDED RELEASE ORAL
Qty: 60 TAB | Refills: 0 | Status: SHIPPED | OUTPATIENT
Start: 2020-10-23 | End: 2021-02-11 | Stop reason: ALTCHOICE

## 2020-10-23 NOTE — LETTER
NOTIFICATION RETURN TO WORK / SCHOOL 
 
10/23/2020 1:48 PM 
 
Mr. Adolfo Chaves [phone] To Whom It May Concern: 
 
Vineet Langley is currently under the care of [facility]. He will return to work/school on:10/26/20 If there are questions or concerns please have the patient contact our office. Sincerely, Fer Jennings NP

## 2020-10-26 NOTE — PROGRESS NOTES
Pretty Padilla Sr. is a 30 y.o. male who presents to the office today for the following:    Chief Complaint   Patient presents with    Muscle Pain    Medication Refill       Past Medical History:   Diagnosis Date    GERD (gastroesophageal reflux disease)        Past Surgical History:   Procedure Laterality Date    COLONOSCOPY N/A 2018    COLONOSCOPY performed by Sarah Alas MD at [address] [facility] OTHER SURGICAL      biopsy taken of left arm infection/        Family History   Problem Relation Age of Onset    Heart Disease Mother     Diabetes Mother     Liver Disease Father     Cancer Father         Social History     Tobacco Use    Smoking status: Current Every Day Smoker     Last attempt to quit:      Years since quittin.8    Smokeless tobacco: Never Used    Tobacco comment: 3 pks/week   Substance Use Topics    Alcohol use: Yes    Drug use: Yes     Types: Marijuana        HPI  Patient here with complaint of cramping that started again 4 days ago. 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 As a result, he did not go to work this week as he was concerned about going back into rhabdomyolysis. Drank a lot of fluids and has rested which resolved the symptoms. States he knows that he did too much. Also wants to get the name of the specialist we referred him to as he lost the paper. Current Outpatient Medications on File Prior to Visit   Medication Sig    hydrOXYzine HCL (ATARAX) 25 mg tablet Take  by mouth three (3) times daily as needed.  fexofenadine (ALLEGRA) 180 mg tablet Take  by mouth.  montelukast (SINGULAIR) 10 mg tablet Take 10 mg by mouth daily. No current facility-administered medications on file prior to visit. No orders of the defined types were placed in this encounter. Review of Systems   Constitutional: Negative. Respiratory: Negative. Cardiovascular: Negative. Gastrointestinal: Negative. Genitourinary: Negative. Musculoskeletal: Positive for myalgias. Neurological: Negative. Visit Vitals  BP (!) 145/82 (BP 1 Location: Left arm, BP Patient Position: Sitting)   Pulse 98   Temp 97.7 °F (36.5 °C) (Tympanic)   Resp 20   SpO2 98%       Physical Exam  Vitals signs and nursing note reviewed. Constitutional:       Appearance: Normal appearance. Cardiovascular:      Rate and Rhythm: Normal rate and regular rhythm. Pulses: Normal pulses. Heart sounds: Normal heart sounds. Pulmonary:      Effort: Pulmonary effort is normal.      Breath sounds: Normal breath sounds. Abdominal:      General: Bowel sounds are normal.      Palpations: Abdomen is soft. Musculoskeletal: Normal range of motion. General: Tenderness (generalized to multiple muscle groups) present. Skin:     General: Skin is warm and dry. Neurological:      General: No focal deficit present. Mental Status: He is alert and oriented to person, place, and time. 1. Exertional rhabdomyolysis  Cramping and pain resolved currently so will hold on repeating any labs unless symptoms return again. Did advise if worsens and provider not available, go immediately to ED which he agrees to  Had discussed at his last visit that he will need to be careful about working prolonged periods and has to avoid getting overheated as this is leading to recurrent rhabdomyolysis. We have referred him already to see a specialist to try to figure out why this is recurrent for him and provided information again today for him to schedule. Work note provided           Patient verbalizes understanding of plan of care as discussed above    Follow-up and Dispositions    · Return if symptoms worsen or fail to improve.

## 2020-12-24 ENCOUNTER — OFFICE VISIT (OUTPATIENT)
Dept: PRIMARY CARE CLINIC | Age: 35
End: 2020-12-24
Payer: MEDICAID

## 2020-12-24 VITALS
WEIGHT: 225 LBS | OXYGEN SATURATION: 98 % | TEMPERATURE: 97.2 F | RESPIRATION RATE: 18 BRPM | SYSTOLIC BLOOD PRESSURE: 137 MMHG | HEART RATE: 97 BPM | BODY MASS INDEX: 36.32 KG/M2 | DIASTOLIC BLOOD PRESSURE: 88 MMHG

## 2020-12-24 DIAGNOSIS — S81.002A OPEN WOUND OF LEFT KNEE, INITIAL ENCOUNTER: ICD-10-CM

## 2020-12-24 DIAGNOSIS — S89.92XA INJURY OF LEFT KNEE, INITIAL ENCOUNTER: Primary | ICD-10-CM

## 2020-12-24 PROBLEM — M62.82 NON-TRAUMATIC RHABDOMYOLYSIS: Status: ACTIVE | Noted: 2020-12-24

## 2020-12-24 PROCEDURE — 99213 OFFICE O/P EST LOW 20 MIN: CPT | Performed by: NURSE PRACTITIONER

## 2020-12-24 PROCEDURE — 90715 TDAP VACCINE 7 YRS/> IM: CPT | Performed by: NURSE PRACTITIONER

## 2020-12-24 PROCEDURE — 90471 IMMUNIZATION ADMIN: CPT | Performed by: NURSE PRACTITIONER

## 2020-12-24 RX ORDER — NAPROXEN 500 MG/1
500 TABLET ORAL 2 TIMES DAILY WITH MEALS
Qty: 60 TAB | Refills: 0 | Status: SHIPPED | OUTPATIENT
Start: 2020-12-24 | End: 2021-02-11 | Stop reason: ALTCHOICE

## 2020-12-24 NOTE — PROGRESS NOTES
Chi Oviedo Sr. is a 30 y.o. male who presents to the office today for the following:    Chief Complaint   Patient presents with    Knee Pain     left        Past Medical History:   Diagnosis Date    GERD (gastroesophageal reflux disease)     Irritable bowel syndrome 2018    Non-traumatic rhabdomyolysis 2020       Past Surgical History:   Procedure Laterality Date    COLONOSCOPY N/A 2018    COLONOSCOPY performed by Adama Drew MD at [address] [facility] OTHER SURGICAL      biopsy taken of left arm infection/        Family History   Problem Relation Age of Onset    Heart Disease Mother     Diabetes Mother     Liver Disease Father     Cancer Father         Social History     Tobacco Use    Smoking status: Current Every Day Smoker     Last attempt to quit:      Years since quittin.9    Smokeless tobacco: Never Used    Tobacco comment: 3 pks/week   Substance Use Topics    Alcohol use: Yes    Drug use: Yes     Types: Marijuana        HPI  Patient here with c/o left knee pain x 9 days. States that he was jumping down off rack in [employer] truck when his knee hit side of another rack. Reports that it caused a gash in his knee and was bleeding. Had immediate pain in middle of knee cap which has persisted ever since. Wound is now scabbed over with no more bleeding. Is getting intermittent sharp pains in middle of knee especially with walking or going up steps. Denies any numbness, clicking or giving out. No prior injuries to knee in past. No OTC medicines taken. Current Outpatient Medications on File Prior to Visit   Medication Sig    hyoscyamine SR (LEVBID) 0.375 mg SR tablet TAKE 1 TABLET BY MOUTH EVERY 12 HOURS    esomeprazole (NEXIUM) 40 mg capsule Take 1 capsule by mouth once daily in the morning    hydrOXYzine HCL (ATARAX) 25 mg tablet Take  by mouth three (3) times daily as needed.  montelukast (SINGULAIR) 10 mg tablet Take 10 mg by mouth daily.     fexofenadine (ALLEGRA) 180 mg tablet Take  by mouth. No current facility-administered medications on file prior to visit. Medications Ordered Today   Medications    naproxen (NAPROSYN) 500 mg tablet     Sig: Take 1 Tab by mouth two (2) times daily (with meals). Dispense:  60 Tab     Refill:  0        Review of Systems   Constitutional: Negative. Respiratory: Negative. Cardiovascular: Negative. Gastrointestinal: Negative. Genitourinary: Negative. Musculoskeletal: Positive for joint pain (left knee) and myalgias. Visit Vitals  /88 (BP 1 Location: Left arm, BP Patient Position: Sitting)   Pulse 97   Temp 97.2 °F (36.2 °C) (Tympanic)   Resp 18   Wt 225 lb (102.1 kg)   SpO2 98%   BMI 36.32 kg/m²       Physical Exam  Vitals signs and nursing note reviewed. Constitutional:       Appearance: Normal appearance. He is obese. Cardiovascular:      Rate and Rhythm: Normal rate and regular rhythm. Pulses: Normal pulses. Heart sounds: Normal heart sounds. Pulmonary:      Effort: Pulmonary effort is normal.      Breath sounds: Normal breath sounds. Abdominal:      General: Bowel sounds are normal.      Palpations: Abdomen is soft. Musculoskeletal: Normal range of motion. General: Swelling (minimal over patella), tenderness (over center of patella) and signs of injury (left knee) present. No deformity. Right lower leg: No edema. Left lower leg: No edema. Skin:     General: Skin is warm and dry. Neurological:      Mental Status: He is alert. Mental status is at baseline. 1. Injury of left knee, initial encounter    - XR KNEE LT MAX 2 VWS; Future  - TETANUS, DIPHTHERIA TOXOIDS AND ACELLULAR PERTUSSIS VACCINE (TDAP), IN INDIVIDS. >=7, IM    2.  Open wound of left knee, initial encounter    - TETANUS, DIPHTHERIA TOXOIDS AND ACELLULAR PERTUSSIS VACCINE (TDAP), IN INDIVIDS. >=7, IM      Given persistent pain and trauma to area, will check xray but appears likely contusion  Wound appears to be healing and now scabbed over. Will update TDAP  Start on naproxen w/ food prn and may also use tylenol prn  Rest joint and heat recommended prn  Work note provided until 12/29/20   Discuss any further recommendations pending x-ray results          Patient verbalizes understanding of plan of care as discussed above    Follow-up and Dispositions    · Return if symptoms worsen or fail to improve.

## 2020-12-24 NOTE — LETTER
NOTIFICATION RETURN TO WORK / SCHOOL 
 
12/24/2020 9:12 AM 
 
Mr. Ev Meza [phone] To Whom It May Concern: 
 
Antwan He is currently under the care of [facility]. He will return to work/school on: 12/26/20 Antwan He may return to work/school with the following restrictions: No bending, stooping, or squatting. No climbing over 1-3 steps. If there are questions or concerns please have the patient contact our office. Sincerely, Skye El NP

## 2020-12-24 NOTE — LETTER
NOTIFICATION RETURN TO WORK / SCHOOL 
 
12/24/2020 9:16 AM 
 
Mr. Adolfo Chaves [phone] To Whom It May Concern: 
 
Vineet Langley is currently under the care of [facility]. He will return to work/school on: 12/29/20 If there are questions or concerns please have the patient contact our office. Sincerely, Fer Jennings NP

## 2021-01-13 ENCOUNTER — HOSPITAL ENCOUNTER (EMERGENCY)
Age: 36
Discharge: HOME | End: 2021-01-13
Attending: EMERGENCY MEDICINE
Payer: MEDICAID

## 2021-01-13 VITALS
WEIGHT: 227.7 LBS | HEIGHT: 68 IN | BODY MASS INDEX: 34.51 KG/M2 | TEMPERATURE: 98.9 F | OXYGEN SATURATION: 98 % | RESPIRATION RATE: 18 BRPM | SYSTOLIC BLOOD PRESSURE: 163 MMHG | HEART RATE: 94 BPM | DIASTOLIC BLOOD PRESSURE: 112 MMHG

## 2021-01-13 DIAGNOSIS — R10.13 ABDOMINAL PAIN, EPIGASTRIC: Primary | ICD-10-CM

## 2021-01-13 LAB
ALBUMIN SERPL-MCNC: 4.5 G/DL (ref 3.5–5)
ALBUMIN/GLOB SERPL: 0.9 {RATIO} (ref 1.1–2.2)
ALP SERPL-CCNC: 89 U/L (ref 45–117)
ALT SERPL-CCNC: 84 U/L (ref 12–78)
ANION GAP SERPL CALC-SCNC: 12 MMOL/L (ref 5–15)
AST SERPL W P-5'-P-CCNC: 57 U/L (ref 15–37)
BASOPHILS # BLD: 0.1 K/UL (ref 0–0.2)
BASOPHILS NFR BLD: 1 % (ref 0–2.5)
BILIRUB SERPL-MCNC: 0.9 MG/DL (ref 0.2–1)
BUN SERPL-MCNC: 13 MG/DL (ref 6–20)
BUN/CREAT SERPL: 11 (ref 12–20)
CA-I BLD-MCNC: 9.4 MG/DL (ref 8.5–10.1)
CHLORIDE SERPL-SCNC: 97 MMOL/L (ref 97–108)
CO2 SERPL-SCNC: 29 MMOL/L (ref 21–32)
CREAT SERPL-MCNC: 1.14 MG/DL (ref 0.7–1.3)
EOSINOPHIL # BLD: 0.1 K/UL (ref 0–0.7)
EOSINOPHIL NFR BLD: 1 % (ref 0.9–2.9)
ERYTHROCYTE [DISTWIDTH] IN BLOOD BY AUTOMATED COUNT: 14.4 % (ref 11.5–14.5)
ETHANOL SERPL-MCNC: 11 MG/DL
GLOBULIN SER CALC-MCNC: 4.8 G/DL (ref 2–4)
GLUCOSE SERPL-MCNC: 97 MG/DL (ref 65–100)
HCT VFR BLD AUTO: 49.1 % (ref 41–53)
HGB BLD-MCNC: 16.4 G/DL (ref 13.5–17.5)
INR PPP: 1 (ref 0.9–1.1)
LIPASE SERPL-CCNC: 118 U/L (ref 73–393)
LYMPHOCYTES # BLD: 2.6 K/UL (ref 1–4.8)
LYMPHOCYTES NFR BLD: 30 % (ref 20.5–51.1)
MAGNESIUM SERPL-MCNC: 1.6 MG/DL (ref 1.6–2.4)
MCH RBC QN AUTO: 30 PG (ref 31–34)
MCHC RBC AUTO-ENTMCNC: 33.4 G/DL (ref 31–36)
MCV RBC AUTO: 89.9 FL (ref 80–100)
MONOCYTES # BLD: 0.8 K/UL (ref 0.2–2.4)
MONOCYTES NFR BLD: 9 % (ref 1.7–9.3)
NEUTS SEG # BLD: 5.3 K/UL (ref 1.8–7.7)
NEUTS SEG NFR BLD: 59 % (ref 42–75)
NRBC # BLD: 0.08 K/UL
NRBC BLD-RTO: 90 PER 100 WBC
PLATELET # BLD AUTO: 245 K/UL
PMV BLD AUTO: 7.8 FL (ref 6.5–11.5)
POTASSIUM SERPL-SCNC: 3.3 MMOL/L (ref 3.5–5.1)
PROT SERPL-MCNC: 9.3 G/DL (ref 6.4–8.2)
PROTHROMBIN TIME: 13 SEC (ref 11.9–14.7)
RBC # BLD AUTO: 5.46 M/UL (ref 4.5–5.9)
SODIUM SERPL-SCNC: 138 MMOL/L (ref 136–145)
WBC # BLD AUTO: 8.9 K/UL (ref 4.4–11.3)

## 2021-01-13 PROCEDURE — 85610 PROTHROMBIN TIME: CPT

## 2021-01-13 PROCEDURE — 83735 ASSAY OF MAGNESIUM: CPT

## 2021-01-13 PROCEDURE — 83690 ASSAY OF LIPASE: CPT

## 2021-01-13 PROCEDURE — 85025 COMPLETE CBC W/AUTO DIFF WBC: CPT

## 2021-01-13 PROCEDURE — 96375 TX/PRO/DX INJ NEW DRUG ADDON: CPT

## 2021-01-13 PROCEDURE — 80053 COMPREHEN METABOLIC PANEL: CPT

## 2021-01-13 PROCEDURE — 99282 EMERGENCY DEPT VISIT SF MDM: CPT

## 2021-01-13 PROCEDURE — 74011000250 HC RX REV CODE- 250: Performed by: EMERGENCY MEDICINE

## 2021-01-13 PROCEDURE — 96374 THER/PROPH/DIAG INJ IV PUSH: CPT

## 2021-01-13 PROCEDURE — 82077 ASSAY SPEC XCP UR&BREATH IA: CPT

## 2021-01-13 PROCEDURE — 74011250636 HC RX REV CODE- 250/636: Performed by: EMERGENCY MEDICINE

## 2021-01-13 RX ORDER — FENTANYL CITRATE 50 UG/ML
100 INJECTION, SOLUTION INTRAMUSCULAR; INTRAVENOUS
Status: COMPLETED | OUTPATIENT
Start: 2021-01-13 | End: 2021-01-13

## 2021-01-13 RX ORDER — ONDANSETRON 4 MG/1
4 TABLET, ORALLY DISINTEGRATING ORAL
Qty: 20 TAB | Refills: 0 | Status: SHIPPED | OUTPATIENT
Start: 2021-01-13 | End: 2021-03-02 | Stop reason: ALTCHOICE

## 2021-01-13 RX ORDER — HYDROGEN PEROXIDE 3 %
20 SOLUTION, NON-ORAL MISCELLANEOUS DAILY
Qty: 20 CAP | Refills: 0 | Status: SHIPPED | OUTPATIENT
Start: 2021-01-13 | End: 2021-01-18 | Stop reason: SDUPTHER

## 2021-01-13 RX ORDER — ONDANSETRON 2 MG/ML
4 INJECTION INTRAMUSCULAR; INTRAVENOUS
Status: COMPLETED | OUTPATIENT
Start: 2021-01-13 | End: 2021-01-13

## 2021-01-13 RX ADMIN — SODIUM CHLORIDE 1000 ML: 9 INJECTION, SOLUTION INTRAVENOUS at 11:27

## 2021-01-13 RX ADMIN — ONDANSETRON 4 MG: 2 INJECTION INTRAMUSCULAR; INTRAVENOUS at 11:27

## 2021-01-13 RX ADMIN — FENTANYL CITRATE 100 MCG: 50 INJECTION, SOLUTION INTRAMUSCULAR; INTRAVENOUS at 11:27

## 2021-01-13 RX ADMIN — FAMOTIDINE 20 MG: 10 INJECTION INTRAVENOUS at 11:27

## 2021-01-13 NOTE — LETTER
NOTIFICATION RETURN TO WORK / SCHOOL 
 
1/13/2021 1:14 PM 
 
Mr. Jason Mince [place] [phone] To Whom It May Concern: 
 
Willi Downing. is currently under the care of [facility] EMERGENCY DEPT. He will return to work/school on: 1/15/21 If there are questions or concerns please have the patient contact our office. Sincerely, Flor Lucas RN

## 2021-01-13 NOTE — ED PROVIDER NOTES
EMERGENCY DEPARTMENT HISTORY AND PHYSICAL EXAM      Date: 1/13/2021  Patient Name: Gerson Koroma. History of Presenting Illness     Chief Complaint   Patient presents with    Abdominal Pain     pt c/o abd pain and cramping onset Monday; pt reports vomiting and diarrhea along with cough producing yellow mucous; AOx4; pt reports pain 8/10 to upper abd. and blood in stool and vomitus       History Provided By: Patient    HPI: Princess Smiley Sr., 30 y.o. male with a past medical history significant Irritable bowel syndrome presents to the ED with cc of one episode of vomiting today and 2-3 episodes of diarrhea yesterday abdominal pain which is crampy achy started 2 days ago; pain is epigastric nonradiating pain scale of 8/10    There are no other complaints, changes, or physical findings at this time. PCP: Candelario Wooten NP    No current facility-administered medications on file prior to encounter. Current Outpatient Medications on File Prior to Encounter   Medication Sig Dispense Refill    naproxen (NAPROSYN) 500 mg tablet Take 1 Tab by mouth two (2) times daily (with meals). 60 Tab 0    hyoscyamine SR (LEVBID) 0.375 mg SR tablet TAKE 1 TABLET BY MOUTH EVERY 12 HOURS 60 Tab 0    esomeprazole (NEXIUM) 40 mg capsule Take 1 capsule by mouth once daily in the morning 90 Cap 0    hydrOXYzine HCL (ATARAX) 25 mg tablet Take  by mouth three (3) times daily as needed.  montelukast (SINGULAIR) 10 mg tablet Take 10 mg by mouth daily.  fexofenadine (ALLEGRA) 180 mg tablet Take  by mouth.          Past History     Past Medical History:  Past Medical History:   Diagnosis Date    GERD (gastroesophageal reflux disease)     Irritable bowel syndrome 6/25/2018    Non-traumatic rhabdomyolysis 12/24/2020       Past Surgical History:  Past Surgical History:   Procedure Laterality Date    COLONOSCOPY N/A 5/22/2018    COLONOSCOPY performed by Rebeca Aguirre MD at [facility] H[facility] OTHER SURGICAL biopsy taken of left arm infection/       Family History:  Family History   Problem Relation Age of Onset    Heart Disease Mother     Diabetes Mother     Liver Disease Father     Cancer Father        Social History:  Social History     Tobacco Use    Smoking status: Current Every Day Smoker     Packs/day: 0.50     Last attempt to quit:      Years since quittin.0    Smokeless tobacco: Never Used    Tobacco comment: 3 pks/week   Substance Use Topics    Alcohol use: Yes     Alcohol/week: 18.0 standard drinks     Types: 18 Cans of beer per week     Comment: every other day    Drug use: Yes     Types: Marijuana     Comment: every other day       Allergies: Allergies   Allergen Reactions    Cashew Nut Hives    Egg Derived Hives    Milk Unknown (comments)    Onion Hives    Peanut Unknown (comments)    Tomato Hives         Review of Systems     Review of Systems   Constitutional: Negative for chills and fever. HENT: Negative for rhinorrhea and sore throat. Eyes: Negative for discharge and visual disturbance. Respiratory: Negative for cough and shortness of breath. Cardiovascular: Negative for chest pain and leg swelling. Gastrointestinal: Positive for abdominal pain, diarrhea, nausea and vomiting. Endocrine: Negative for polydipsia and polyuria. Genitourinary: Negative for dysuria and urgency. Musculoskeletal: Negative for back pain and neck pain. Skin: Negative for color change and pallor. Neurological: Negative for weakness and numbness. Psychiatric/Behavioral: Negative. Physical Exam     Physical Exam  Vitals signs and nursing note reviewed. Constitutional:       Appearance: He is well-developed. HENT:      Head: Normocephalic and atraumatic. Mouth/Throat:      Mouth: Mucous membranes are moist.      Pharynx: Oropharynx is clear. Eyes:      Extraocular Movements: Extraocular movements intact. Pupils: Pupils are equal, round, and reactive to light. Neck:      Musculoskeletal: Normal range of motion and neck supple. Cardiovascular:      Rate and Rhythm: Normal rate and regular rhythm. Pulses: Normal pulses. Heart sounds: Normal heart sounds. Pulmonary:      Effort: Pulmonary effort is normal.      Breath sounds: Normal breath sounds. Abdominal:      General: Bowel sounds are decreased. Palpations: Abdomen is soft. Tenderness: There is abdominal tenderness in the epigastric area. Musculoskeletal: Normal range of motion. General: No tenderness or signs of injury. Skin:     General: Skin is warm and dry. Capillary Refill: Capillary refill takes less than 2 seconds. Neurological:      General: No focal deficit present. Mental Status: He is alert and oriented to person, place, and time. Psychiatric:         Mood and Affect: Mood normal.         Behavior: Behavior normal.         Lab and Diagnostic Study Results     Labs -   No results found for this or any previous visit (from the past 12 hour(s)). Radiologic Studies -   @lastxrresult@  CT Results  (Last 48 hours)    None        CXR Results  (Last 48 hours)    None            Medical Decision Making   - I am the first provider for this patient. - I reviewed the vital signs, available nursing notes, past medical history, past surgical history, family history and social history. - Initial assessment performed. The patients presenting problems have been discussed, and they are in agreement with the care plan formulated and outlined with them. I have encouraged them to ask questions as they arise throughout their visit. Vital Signs-Reviewed the patient's vital signs.   Patient Vitals for the past 12 hrs:   Temp Pulse Resp BP SpO2   01/13/21 1055 98.9 °F (37.2 °C) 94 18 (!) 163/112 98 %       Records Reviewed: Nursing Notes    The patient presents with abdominal pain with a differential diagnosis of diverticulitis, gastroenteritis and pancreatitis      ED Course:          Provider Notes (Medical Decision Making): MDM       Procedures   Medical Decision Makingedical Decision Making  Performed by: Jennifer Verdin MD  PROCEDURES:  Procedures       Disposition   Disposition: Condition stable and improved  DC- Adult Discharges: All of the diagnostic tests were reviewed and questions answered. Diagnosis, care plan and treatment options were discussed. The patient understands the instructions and will follow up as directed. The patients results have been reviewed with them. They have been counseled regarding their diagnosis. The patient verbally convey understanding and agreement of the signs, symptoms, diagnosis, treatment and prognosis and additionally agrees to follow up as recommended with their PCP in 24 - 48 hours. They also agree with the care-plan and convey that all of their questions have been answered. I have also put together some discharge instructions for them that include: 1) educational information regarding their diagnosis, 2) how to care for their diagnosis at home, as well a 3) list of reasons why they would want to return to the ED prior to their follow-up appointment, should their condition change. DISCHARGE PLAN:  1. Current Discharge Medication List      CONTINUE these medications which have NOT CHANGED    Details   naproxen (NAPROSYN) 500 mg tablet Take 1 Tab by mouth two (2) times daily (with meals). Qty: 60 Tab, Refills: 0    Associated Diagnoses: Injury of left knee, initial encounter      hyoscyamine SR (LEVBID) 0.375 mg SR tablet TAKE 1 TABLET BY MOUTH EVERY 12 HOURS  Qty: 60 Tab, Refills: 0      esomeprazole (NEXIUM) 40 mg capsule Take 1 capsule by mouth once daily in the morning  Qty: 90 Cap, Refills: 0      hydrOXYzine HCL (ATARAX) 25 mg tablet Take  by mouth three (3) times daily as needed. montelukast (SINGULAIR) 10 mg tablet Take 10 mg by mouth daily. fexofenadine (ALLEGRA) 180 mg tablet Take  by mouth. 2.   Follow-up Information    None       3. Return to ED if worse   4. Current Discharge Medication List            Diagnosis     Clinical Impression: No diagnosis found. Attestations:    Levi Morocho MD    Please note that this dictation was completed with Trigence, the computer voice recognition software. Quite often unanticipated grammatical, syntax, homophones, and other interpretive errors are inadvertently transcribed by the computer software. Please disregard these errors. Please excuse any errors that have escaped final proofreading. Thank you.

## 2021-01-13 NOTE — LETTER
NOTIFICATION RETURN TO WORK / SCHOOL 
 
1/13/2021 1:14 PM 
 
Mr. Veronika [place] [phone] To Whom It May Concern: 
 
Gerson Koroma. is currently under the care of [facility] EMERGENCY DEPT. He will return to work/school on: 1/13/21 If there are questions or concerns please have the patient contact our office. Sincerely, Barbie Owens RN

## 2021-01-18 ENCOUNTER — OFFICE VISIT (OUTPATIENT)
Dept: PRIMARY CARE CLINIC | Age: 36
End: 2021-01-18
Payer: MEDICAID

## 2021-01-18 VITALS
SYSTOLIC BLOOD PRESSURE: 132 MMHG | HEART RATE: 84 BPM | DIASTOLIC BLOOD PRESSURE: 81 MMHG | TEMPERATURE: 97.3 F | RESPIRATION RATE: 18 BRPM | OXYGEN SATURATION: 98 %

## 2021-01-18 DIAGNOSIS — F10.10 ALCOHOL ABUSE: Primary | ICD-10-CM

## 2021-01-18 DIAGNOSIS — K29.01 ACUTE GASTRITIS WITH HEMORRHAGE, UNSPECIFIED GASTRITIS TYPE: ICD-10-CM

## 2021-01-18 DIAGNOSIS — Z09 HOSPITAL DISCHARGE FOLLOW-UP: ICD-10-CM

## 2021-01-18 PROBLEM — K21.9 GASTROESOPHAGEAL REFLUX DISEASE: Status: RESOLVED | Noted: 2018-06-25 | Resolved: 2021-01-18

## 2021-01-18 PROCEDURE — 1111F DSCHRG MED/CURRENT MED MERGE: CPT | Performed by: NURSE PRACTITIONER

## 2021-01-18 PROCEDURE — 99214 OFFICE O/P EST MOD 30 MIN: CPT | Performed by: NURSE PRACTITIONER

## 2021-01-18 RX ORDER — ESOMEPRAZOLE MAGNESIUM 40 MG/1
40 CAPSULE, DELAYED RELEASE ORAL DAILY
Qty: 30 CAP | Refills: 0 | Status: SHIPPED | OUTPATIENT
Start: 2021-01-18 | End: 2021-02-03 | Stop reason: SDUPTHER

## 2021-01-18 RX ORDER — NALTREXONE HYDROCHLORIDE 50 MG/1
50 TABLET, FILM COATED ORAL DAILY
Qty: 30 TAB | Refills: 0 | Status: SHIPPED | OUTPATIENT
Start: 2021-01-18 | End: 2021-02-11 | Stop reason: SDUPTHER

## 2021-01-24 NOTE — PROGRESS NOTES
Brianna Bar is a 30 y.o. male who presents to the office today for the following:    Chief Complaint   Patient presents with   [facility] Follow Up    GERD    Alcohol Use Disorder       Past Medical History:   Diagnosis Date    GERD (gastroesophageal reflux disease)     Irritable bowel syndrome 2018    Non-traumatic rhabdomyolysis 2020       Past Surgical History:   Procedure Laterality Date    COLONOSCOPY N/A 2018    COL[facility] performed by Holley Ru[facility] at [facility] OTHER SURGICAL      biopsy taken of left arm infection/        Family History   Problem Relation Age of Onset    Heart Disease Mother     Diabetes Mother     Liver Disease Father     Cancer Father         Social History     Tobacco Use    Smoking status: Current Every Day Smoker     Packs/day: 0.50     Last attempt to quit:      Years since quittin.0    Smokeless tobacco: Never Used    Tobacco comment: 3 pks/week   Substance Use Topics    Alcohol use: Yes     Alcohol/week: 18.0 standard drinks     Types: 18 Cans of beer per week     Frequency: 4 or more times a week     Drinks per session: 10 or more     Binge frequency: Daily or almost daily    Drug use: Not Currently     Types: Marijuana     Comment: every other day        HPI  Patient here today for follow up from ED for abdominal pain and vomiting. States that he was seen on 21 after he developed diarrhea, epigastric pain and vomiting 2 days prior. States that this is better today but does still have some mild epigastric discomfort. Feels the nexium prescribed by ED is helping. Does want to discuss some concerns about frequent alcohol use. Reports that at past visits he was not honest about alcohol intake but over the past 1 year he has been drinking beer almost daily. States that some days he will skip but on the days that he drinks, he will drink almost 10-15 beers in a day. Denies liquor or drug use at present.  Reports that he quit for several years but then resumed again in the past year. Feels that he can quit again as he did this on his own in past but wanted to see if there was something to help. Does not want inpatient therapy. Has wife at home who is concerned about his drinking but is supportive of him. Current Outpatient Medications on File Prior to Visit   Medication Sig    ondansetron (Zofran ODT) 4 mg disintegrating tablet 1 Tab by SubLINGual route every eight (8) hours as needed for Nausea or Vomiting.  naproxen (NAPROSYN) 500 mg tablet Take 1 Tab by mouth two (2) times daily (with meals).  hyoscyamine SR (LEVBID) 0.375 mg SR tablet TAKE 1 TABLET BY MOUTH EVERY 12 HOURS    hydrOXYzine HCL (ATARAX) 25 mg tablet Take  by mouth three (3) times daily as needed.  montelukast (SINGULAIR) 10 mg tablet Take 10 mg by mouth daily.  fexofenadine (ALLEGRA) 180 mg tablet Take  by mouth. No current facility-administered medications on file prior to visit. Medications Ordered Today   Medications    esomeprazole (NexIUM) 40 mg capsule     Sig: Take 1 Cap by mouth daily for 20 days. Dispense:  30 Cap     Refill:  0    naltrexone (DEPADE) 50 mg tablet     Sig: Take 1 Tab by mouth daily. Dispense:  30 Tab     Refill:  0        Review of Systems   Constitutional: Negative. Eyes: Negative. Respiratory: Negative. Cardiovascular: Negative. Gastrointestinal: Positive for abdominal pain, heartburn and nausea. Negative for blood in stool, constipation, diarrhea, melena and vomiting. Genitourinary: Negative. Musculoskeletal: Positive for myalgias. Neurological: Negative. Psychiatric/Behavioral: Positive for substance abuse. Negative for depression, hallucinations, memory loss and suicidal ideas. The patient has insomnia. The patient is not nervous/anxious.            Visit Vitals  /81 (BP 1 Location: Left arm, BP Patient Position: Sitting)   Pulse 84   Temp 97.3 °F (36.3 °C) (Tympanic)   Resp 18   SpO2 98%       Physical Exam  Vitals signs and nursing note reviewed. Constitutional:       Appearance: Normal appearance. He is obese. Eyes:      Pupils: Pupils are equal, round, and reactive to light. Cardiovascular:      Rate and Rhythm: Normal rate and regular rhythm. Pulses: Normal pulses. Heart sounds: Normal heart sounds. Pulmonary:      Effort: Pulmonary effort is normal.      Breath sounds: Normal breath sounds. Abdominal:      General: Bowel sounds are normal.      Palpations: Abdomen is soft. Tenderness: There is abdominal tenderness (mild epigastric). Musculoskeletal: Normal range of motion. Skin:     General: Skin is warm and dry. Neurological:      Mental Status: He is alert and oriented to person, place, and time. Mental status is at baseline. 1. Alcohol abuse  CAGE score is 4 and patient reports being motivated to quit drinking  Not reporting any use of other substances   Will start on naltrexone as directed and reviewed potential side effects  Encourage outpatient therapies such as AA and counseling as does not want to do inpatient therapy  Going to refer to psychiatry for further eval and treatment  Discussed concerns about withdrawal symptoms and will notify provider immediately or seek emergency care if needed should they develop  Have advised of importance in not driving if impaired  - naltrexone (DEPADE) 50 mg tablet; Take 1 Tab by mouth daily. Dispense: 30 Tab; Refill: 0   - REFERRAL TO PSYCHIATRY    2. Acute gastritis with hemorrhage, unspecified gastritis type  Likely resulting for frequent ETOH use  Is improved since resuming nexium 20mg daily and will increase to 40mg daily as still some sx   Notify provider if any sudden worsening or change  - esomeprazole (NexIUM) 40 mg capsule; Take 1 Cap by mouth daily for 20 days. Dispense: 30 Cap;  Refill: 0            Patient verbalizes understanding of plan of care as discussed above    Follow-up and Dispositions    · Return in about 4 weeks (around 2/15/2021), or if symptoms worsen or fail to improve.

## 2021-02-03 DIAGNOSIS — K29.01 ACUTE GASTRITIS WITH HEMORRHAGE, UNSPECIFIED GASTRITIS TYPE: ICD-10-CM

## 2021-02-03 DIAGNOSIS — Z88.9 H/O SEASONAL ALLERGIES: Primary | ICD-10-CM

## 2021-02-03 RX ORDER — ESOMEPRAZOLE MAGNESIUM 40 MG/1
40 CAPSULE, DELAYED RELEASE ORAL DAILY
Qty: 90 CAP | Refills: 0 | Status: SHIPPED | OUTPATIENT
Start: 2021-02-03 | End: 2021-02-11 | Stop reason: SDUPTHER

## 2021-02-03 RX ORDER — MINERAL OIL
30 ENEMA (ML) RECTAL DAILY
Qty: 90 TAB | Refills: 0 | Status: SHIPPED | OUTPATIENT
Start: 2021-02-03 | End: 2021-07-12 | Stop reason: SDUPTHER

## 2021-02-11 ENCOUNTER — OFFICE VISIT (OUTPATIENT)
Dept: PRIMARY CARE CLINIC | Age: 36
End: 2021-02-11
Payer: MEDICAID

## 2021-02-11 VITALS
HEART RATE: 76 BPM | BODY MASS INDEX: 34.25 KG/M2 | OXYGEN SATURATION: 98 % | HEIGHT: 68 IN | TEMPERATURE: 96.8 F | RESPIRATION RATE: 16 BRPM | SYSTOLIC BLOOD PRESSURE: 133 MMHG | DIASTOLIC BLOOD PRESSURE: 85 MMHG | WEIGHT: 226 LBS

## 2021-02-11 DIAGNOSIS — F10.10 ALCOHOL ABUSE: ICD-10-CM

## 2021-02-11 DIAGNOSIS — K29.00 ACUTE GASTRITIS WITHOUT HEMORRHAGE, UNSPECIFIED GASTRITIS TYPE: ICD-10-CM

## 2021-02-11 DIAGNOSIS — E87.6 HYPOKALEMIA: ICD-10-CM

## 2021-02-11 DIAGNOSIS — K59.09 CHRONIC CONSTIPATION: Primary | ICD-10-CM

## 2021-02-11 DIAGNOSIS — M62.82 NON-TRAUMATIC RHABDOMYOLYSIS: ICD-10-CM

## 2021-02-11 PROCEDURE — 99214 OFFICE O/P EST MOD 30 MIN: CPT | Performed by: NURSE PRACTITIONER

## 2021-02-11 RX ORDER — LACTULOSE 10 G/15ML
30 SOLUTION ORAL; RECTAL
COMMUNITY
End: 2021-02-11 | Stop reason: SDUPTHER

## 2021-02-11 RX ORDER — ESOMEPRAZOLE MAGNESIUM 40 MG/1
40 CAPSULE, DELAYED RELEASE ORAL DAILY
Qty: 90 CAP | Refills: 0 | Status: SHIPPED | OUTPATIENT
Start: 2021-02-11 | End: 2021-05-12

## 2021-02-11 RX ORDER — MONTELUKAST SODIUM 10 MG/1
10 TABLET ORAL DAILY
Qty: 90 TAB | Refills: 0 | Status: SHIPPED | OUTPATIENT
Start: 2021-02-11 | End: 2021-11-08 | Stop reason: SDUPTHER

## 2021-02-11 RX ORDER — NALTREXONE HYDROCHLORIDE 50 MG/1
50 TABLET, FILM COATED ORAL DAILY
Qty: 30 TAB | Refills: 1 | Status: SHIPPED | OUTPATIENT
Start: 2021-02-11 | End: 2021-03-12 | Stop reason: SDUPTHER

## 2021-02-11 RX ORDER — POTASSIUM CHLORIDE 750 MG/1
TABLET, FILM COATED, EXTENDED RELEASE ORAL
COMMUNITY
End: 2021-02-11 | Stop reason: ALTCHOICE

## 2021-02-11 RX ORDER — POTASSIUM CHLORIDE 750 MG/1
1 TABLET, FILM COATED, EXTENDED RELEASE ORAL DAILY
COMMUNITY
End: 2021-03-12

## 2021-02-11 RX ORDER — FAMOTIDINE 40 MG/1
40 TABLET, FILM COATED ORAL DAILY
COMMUNITY
End: 2021-02-11 | Stop reason: ALTCHOICE

## 2021-02-11 RX ORDER — FLUTICASONE PROPIONATE 50 MCG
2 SPRAY, SUSPENSION (ML) NASAL
COMMUNITY
End: 2021-03-10 | Stop reason: SDUPTHER

## 2021-02-11 RX ORDER — LACTULOSE 10 G/15ML
SOLUTION ORAL; RECTAL
Qty: 946 ML | Refills: 0 | Status: SHIPPED | OUTPATIENT
Start: 2021-02-11 | End: 2021-04-21 | Stop reason: ALTCHOICE

## 2021-02-11 NOTE — PROGRESS NOTES
1. Have you been to the ER, urgent care clinic since your last visit? Hospitalized since your last visit? No    2. Have you seen or consulted any other health care providers outside of the [address] [place] [address] since your last visit? Include any pap smears or colon screening. No     Chief Complaint   Patient presents with    Medication Refill     Patient was started on Potassium chloride by another provider and is taking Hyoscyamine and a contraindication between the two alerted during intake.  Abdominal Pain     Patient reports he has irritable bowel syndrome and let his medication run out.

## 2021-02-11 NOTE — LETTER
NOTIFICATION RETURN TO WORK / SCHOOL 
 
2/11/2021 11:01 AM 
 
Mr. Chente Lu [facility] [id number] To Whom It May Concern: 
 
Verner Balboa is currently under the care of [facility]. He is excused from work Tuesday,  2/09/21, Wednesday,  02/10/21, and Thursday, 02/11/21. He will return to work/school on Friday, 02/12/21. If there are questions or concerns please have the patient contact our office. Sincerely, Love Bennett NP

## 2021-02-11 NOTE — LETTER
NOTIFICATION RETURN TO WORK / SCHOOL 
 
2/11/2021 10:49 AM 
 
Mr. Fabricio Te[place] [phone] To Whom It May Concern: 
 
Isabelle Seay is currently under the care of [facility]. He will return to work/school on:2/12/21 If there are questions or concerns please have the patient contact our office. Sincerely, Seun Long NP

## 2021-02-12 LAB — POTASSIUM SERPL-SCNC: 4.1 MMOL/L (ref 3.5–5.2)

## 2021-02-15 ENCOUNTER — TELEPHONE (OUTPATIENT)
Dept: PRIMARY CARE CLINIC | Age: 36
End: 2021-02-15

## 2021-02-15 NOTE — TELEPHONE ENCOUNTER
called pt and was informed of the results          ----- Message from Wayne Lewis NP sent at 2/12/2021  4:40 PM EST -----  Please let him know that it is normal.

## 2021-02-21 NOTE — PROGRESS NOTES
Elizabeth Cantrell is a 30 y.o. male who presents to the office today for the following:    Chief Complaint   Patient presents with    Medication Refill     Patient was started on Potassium chloride by another provider and is taking Hyoscyamine and a contraindication between the two alerted during intake.  Abdominal Pain     Patient reports he has irritable bowel syndrome and let his medication run out.  Alcohol Use Disorder       Past Medical History:   Diagnosis Date    GERD (gastroesophageal reflux disease)     Irritable bowel syndrome 2018    Irritable bowel syndrome     Non-traumatic rhabdomyolysis 2020       Past Surgical History:   Procedure Laterality Date    COLONOSCOPY N/A 2018    COLON[facility] performed by oJseph Scruggs [facility] at [facility] OTHER SURGICAL      biopsy taken of left arm infection/        Family History   Problem Relation Age of Onset    Heart Disease Mother     Diabetes Mother     Liver Disease Father     Cancer Father         Social History     Tobacco Use    Smoking status: Current Every Day Smoker     Packs/day: 0.50     Years: 2.00     Pack years: 1.00     Last attempt to quit:      Years since quittin.1    Smokeless tobacco: Never Used    Tobacco comment: 3 pks/week   Substance Use Topics    Alcohol use: Not Currently     Frequency: Never     Comment: Patient reports he has quit drinking alcohol x 1 month.  Drug use: Not Currently        HPI  Patient here for follow up with PMH of GERD, IBS-D, ETOH Abuse, tobacco dependence, hypokalemia, recurrent rhabdomyolysis and obesity. States that since last visit he has not drank any alcohol and is tolerating the naltrexone. Is still experiencing some epigastric pain but feels the nexium has helped him. Needs refill on the lactulose that he uses intermittently for constipation due to h/o IBS. Not acutely constipated today but was having some problem with this last week.  No blood noted in stool. Has been taking the potassium supplement and wanted to have level repeated today. Recently changed jobs and this has helped with the frequent muscle cramps and pain. Current Outpatient Medications on File Prior to Visit   Medication Sig    fluticasone propionate (FLONASE) 50 mcg/actuation nasal spray 2 Sprays by Both Nostrils route daily as needed.  potassium chloride SR (KLOR-CON 10) 10 mEq tablet Take 1 Tab by mouth daily.  fexofenadine (ALLEGRA) 180 mg tablet Take 0.5 Tabs by mouth daily.  ondansetron (Zofran ODT) 4 mg disintegrating tablet 1 Tab by SubLINGual route every eight (8) hours as needed for Nausea or Vomiting.  hydrOXYzine HCL (ATARAX) 25 mg tablet Take  by mouth three (3) times daily as needed. No current facility-administered medications on file prior to visit. Medications Ordered Today   Medications    lactulose (CHRONULAC) 10 gram/15 mL solution     Sig: Take 30ml by mouth twice daily     Dispense:  946 mL     Refill:  0    naltrexone (DEPADE) 50 mg tablet     Sig: Take 1 Tab by mouth daily. Dispense:  30 Tab     Refill:  1    esomeprazole (NexIUM) 40 mg capsule     Sig: Take 1 Cap by mouth daily for 90 days. Dispense:  90 Cap     Refill:  0    montelukast (SINGULAIR) 10 mg tablet     Sig: Take 1 Tab by mouth daily. Dispense:  90 Tab     Refill:  0        Review of Systems   Constitutional: Negative. Eyes: Negative. Respiratory: Negative. Cardiovascular: Negative. Gastrointestinal: Positive for abdominal pain, constipation and heartburn. Negative for blood in stool, diarrhea, melena, nausea and vomiting. Genitourinary: Negative. Musculoskeletal: Positive for myalgias. Neurological: Negative. Psychiatric/Behavioral: Positive for substance abuse. Negative for depression, hallucinations, memory loss and suicidal ideas. The patient is not nervous/anxious.         Visit Vitals  /85 (BP 1 Location: Left lower arm, BP Patient Position: Sitting, BP Cuff Size: Adult long)   Pulse 76   Temp 96.8 °F (36 °C) (Temporal)   Resp 16   Ht 5' 8\" (1.727 m)   Wt 226 lb (102.5 kg)   SpO2 98%   BMI 34.36 kg/m²       Physical Exam  Vitals signs and nursing note reviewed. Constitutional:       Appearance: Normal appearance. He is obese. Eyes:      Pupils: Pupils are equal, round, and reactive to light. Cardiovascular:      Rate and Rhythm: Normal rate and regular rhythm. Pulses: Normal pulses. Heart sounds: Normal heart sounds. Pulmonary:      Effort: Pulmonary effort is normal.      Breath sounds: Normal breath sounds. Abdominal:      General: Bowel sounds are normal.      Palpations: Abdomen is soft. Tenderness: There is abdominal tenderness (mild epigastric). Musculoskeletal: Normal range of motion. Skin:     General: Skin is warm and dry. Neurological:      Mental Status: He is alert and oriented to person, place, and time. Mental status is at baseline. 1. Chronic constipation  Reports dx by GI in past with IBS  Continue the lactulose prn for constipation and will be seeing GI  - lactulose (CHRONULAC) 10 gram/15 mL solution; Take 30ml by mouth twice daily  Dispense: 946 mL; Refill: 0    2. Hypokalemia  Repeat potassium level today and continuing on potassium 10meq daily   - POTASSIUM    3. Alcohol abuse  He does report not using alcohol and doing well with naltrexone. Is scheduled to see psychiatrist and also have discussed outpatient resources that he would benefit from. - naltrexone (DEPADE) 50 mg tablet; Take 1 Tab by mouth daily. Dispense: 30 Tab; Refill: 1    5.  Acute gastritis without hemorrhage, unspecified gastritis type  Epigastric symptoms reported as improved on nexium but do still persist so referring to GI for further eval (advised to notify provider if any trouble being scheduled)  Continue to avoid alcohol, elevate hob prn, avoid spicy foods or other foods that exacerbate symptoms, NO NSAID use.  - esomeprazole (NexIUM) 40 mg capsule; Take 1 Cap by mouth daily for 90 days. Dispense: 90 Cap; Refill: 0  - REFERRAL TO GASTROENTEROLOGY    6. Non-traumatic rhabdomyolysis  No recent recurrence and only occasional cramping  Is having work up done with neurology but seems likely this was being induced by excessive ETOH use which he reports he has stopped since treatment with naltrexone      Patient verbalizes understanding of plan of care as discussed above    Follow-up and Dispositions    · Return in about 3 months (around 5/11/2021) for or sooner for worsening symptoms.

## 2021-03-01 ENCOUNTER — HOSPITAL ENCOUNTER (EMERGENCY)
Age: 36
Discharge: HOME | End: 2021-03-01
Attending: EMERGENCY MEDICINE
Payer: MEDICAID

## 2021-03-01 ENCOUNTER — APPOINTMENT (OUTPATIENT)
Dept: GENERAL RADIOLOGY | Age: 36
End: 2021-03-01
Attending: EMERGENCY MEDICINE
Payer: MEDICAID

## 2021-03-01 VITALS
WEIGHT: 235 LBS | RESPIRATION RATE: 18 BRPM | DIASTOLIC BLOOD PRESSURE: 62 MMHG | HEIGHT: 68 IN | BODY MASS INDEX: 35.61 KG/M2 | HEART RATE: 82 BPM | SYSTOLIC BLOOD PRESSURE: 119 MMHG | TEMPERATURE: 98.6 F | OXYGEN SATURATION: 99 %

## 2021-03-01 DIAGNOSIS — M75.50 BURSITIS AND TENDINITIS OF SHOULDER REGION: Primary | ICD-10-CM

## 2021-03-01 DIAGNOSIS — M75.90 BURSITIS AND TENDINITIS OF SHOULDER REGION: Primary | ICD-10-CM

## 2021-03-01 PROCEDURE — 99282 EMERGENCY DEPT VISIT SF MDM: CPT

## 2021-03-01 PROCEDURE — 74011250636 HC RX REV CODE- 250/636: Performed by: EMERGENCY MEDICINE

## 2021-03-01 PROCEDURE — 73030 X-RAY EXAM OF SHOULDER: CPT

## 2021-03-01 PROCEDURE — 96372 THER/PROPH/DIAG INJ SC/IM: CPT

## 2021-03-01 PROCEDURE — 74011636637 HC RX REV CODE- 636/637: Performed by: EMERGENCY MEDICINE

## 2021-03-01 RX ORDER — KETOROLAC TROMETHAMINE 30 MG/ML
60 INJECTION, SOLUTION INTRAMUSCULAR; INTRAVENOUS
Status: COMPLETED | OUTPATIENT
Start: 2021-03-01 | End: 2021-03-01

## 2021-03-01 RX ORDER — PREDNISONE 20 MG/1
20 TABLET ORAL DAILY
Qty: 10 TAB | Refills: 0 | Status: SHIPPED | OUTPATIENT
Start: 2021-03-01 | End: 2021-03-11

## 2021-03-01 RX ORDER — PREDNISONE 20 MG/1
40 TABLET ORAL
Status: COMPLETED | OUTPATIENT
Start: 2021-03-01 | End: 2021-03-01

## 2021-03-01 RX ADMIN — KETOROLAC TROMETHAMINE 60 MG: 30 INJECTION, SOLUTION INTRAMUSCULAR at 13:53

## 2021-03-01 RX ADMIN — PREDNISONE 40 MG: 20 TABLET ORAL at 13:53

## 2021-03-01 NOTE — LETTER
NOTIFICATION RETURN TO WORK / SCHOOL 
 
3/1/2021 2:30 PM 
 
Mr. Ant Ayala [facility] [phone] To Whom It May Concern: 
 
Julita Henning. is currently under the care of [facility] EMERGENCY DEPT. He will return to work/school on:3/3/21 If there are questions or concerns please have the patient contact our office. Sincerely, Tate Jiménez

## 2021-03-01 NOTE — ED TRIAGE NOTES
.  Chief Complaint   Patient presents with    Shoulder Pain     shoulder pain since Saturday, states pulled something in shoulder pulling meat racks off Saturday, previous injury from working out in same shoulder. Pt rates pain 8/10. Pt OTC medications w/o relief. Pt cap refill and sensation intact/WNL. Pulses present.

## 2021-03-01 NOTE — DISCHARGE INSTRUCTIONS
Thank you! Thank you for allowing me to care for you in the emergency department. I sincerely hope that you are satisfied with your visit today. It is my goal to provide you with excellent care. Below you will find a list of your labs and imaging from your visit today. Should you have any questions regarding these results please do not hesitate to call the emergency department. Labs -   No results found for this or any previous visit (from the past 12 hour(s)). Radiologic Studies -   XR SHOULDER LT AP/LAT MIN 2 V   Final Result   No acute fracture or dislocation. CT Results  (Last 48 hours)      None          CXR Results  (Last 48 hours)      None               If you feel that you have not received excellent quality care or timely care, please ask to speak to the nurse manager. Please choose us in the future for your continued health care needs. ------------------------------------------------------------------------------------------------------------  The exam and treatment you received in the Emergency Department were for an urgent problem and are not intended as complete care. It is important that you follow-up with a doctor, nurse practitioner, or physician assistant to:  (1) confirm your diagnosis,  (2) re-evaluation of changes in your illness and treatment, and  (3) for ongoing care. If your symptoms become worse or you do not improve as expected and you are unable to reach your usual health care provider, you should return to the Emergency Department. We are available 24 hours a day. Please take your discharge instructions with you when you go to your follow-up appointment. If you have any problem arranging a follow-up appointment, contact the Emergency Department immediately. If a prescription has been provided, please have it filled as soon as possible to prevent a delay in treatment. Read the entire medication instruction sheet provided to you by the pharmacy.  If you have any questions or reservations about taking the medication due to side effects or interactions with other medications, please call your primary care physician or contact the ER to speak with the charge nurse. Make an appointment with your family doctor or the physician you were referred to for follow-up of this visit as instructed on your discharge paperwork, as this is a mandatory follow-up. Return to the ER if you are unable to be seen or if you are unable to be seen in a timely manner. If you have any problem arranging the follow-up visit, contact the Emergency Department immediately.

## 2021-03-02 ENCOUNTER — OFFICE VISIT (OUTPATIENT)
Dept: PRIMARY CARE CLINIC | Age: 36
End: 2021-03-02
Payer: MEDICAID

## 2021-03-02 VITALS
DIASTOLIC BLOOD PRESSURE: 86 MMHG | OXYGEN SATURATION: 98 % | BODY MASS INDEX: 32.54 KG/M2 | SYSTOLIC BLOOD PRESSURE: 132 MMHG | WEIGHT: 214 LBS | RESPIRATION RATE: 18 BRPM | HEART RATE: 75 BPM | TEMPERATURE: 98.1 F

## 2021-03-02 DIAGNOSIS — S49.92XA INJURY OF LEFT SHOULDER, INITIAL ENCOUNTER: Primary | ICD-10-CM

## 2021-03-02 PROCEDURE — 99213 OFFICE O/P EST LOW 20 MIN: CPT | Performed by: NURSE PRACTITIONER

## 2021-03-02 NOTE — LETTER
NOTIFICATION RETURN TO WORK / SCHOOL 
 
3/2/2021 2:59 PM 
 
Mr. Fabricio Tejeda [employer] [id number] To Whom It May Concern: 
 
Isabelle Seay is currently under the care of [facility]. He will return to work/school on: 3/4/20 Isabelle Seay may return to work/school with the following restrictions: No overhead lifting No lifting more than 5 pounds with left arm If there are questions or concerns please have the patient contact our office. Sincerely, Seun Long NP

## 2021-03-02 NOTE — PROGRESS NOTES
1. Have you been to the ER, urgent care clinic since your last visit? Hospitalized since your last visit? [place] [facility] 03/01/2021    2. Have you seen or consulted any other health care providers outside of the [address] [place] [address] since your last visit? Include any pap smears or colon screening.  DR Dwayne Wagoner

## 2021-03-02 NOTE — PROGRESS NOTES
Ml Dawn Sr. is a 30 y.o. male who presents to the office today for the following:    Chief Complaint   Patient presents with   [place] ED Follow-up    Shoulder Pain       Past Medical History:   Diagnosis Date    GERD (gastroesophageal reflux disease)     Irritable bowel syndrome 2018    Irritable bowel syndrome     Non-traumatic rhabdomyolysis 2020       Past Surgical History:   Procedure Laterality Date    COLONOSCOPY N/A 2018    [facility]      biopsy taken of left arm infection/        Family History   Problem Relation Age of Onset    Heart Disease Mother     Diabetes Mother     Liver Disease Father     Cancer Father         Social History     Tobacco Use    Smoking status: Current Every Day Smoker     Packs/day: 0.50     Years: 2.00     Pack years: 1.00     Last attempt to quit: 2015     Years since quittin.1    Smokeless tobacco: Never Used    Tobacco comment: 3 pks/week   Substance Use Topics    Alcohol use: Not Currently     Frequency: Never     Comment: Patient reports he has quit drinking alcohol x 1 month.  Drug use: Not Currently        HPI  Patient here for hospital follow-up from ED on 3121 for left shoulder injury. States that on  he was lifting above his head to get ham at his work when he felt a sharp pain in his shoulder. Was not having any pain in the shoulder prior to the injury but did injure the same shoulder about 5 years ago when he was working out. Denies any surgery or other intervention required during previous injury. Was seen by the staff nurse at [facility] and given some Biofreeze and ibuprofen to take. Pain did not improve so went to ED for further eval. had an x-ray done which was okay and was given prednisone which she is currently taking. Reports very minimal improvement and pain increases when he tries to use arm again.   Does report some intermittent tingling in arm as well. Current Outpatient Medications on File Prior to Visit   Medication Sig    predniSONE (DELTASONE) 20 mg tablet Take 20 mg by mouth daily for 10 days. With Breakfast (Patient taking differently: Take 20 mg by mouth daily. ED Visit)    fluticasone propionate (FLONASE) 50 mcg/actuation nasal spray 2 Sprays by Both Nostrils route daily as needed.  potassium chloride SR (KLOR-CON 10) 10 mEq tablet Take 1 Tab by mouth daily.  lactulose (CHRONULAC) 10 gram/15 mL solution Take 30ml by mouth twice daily    naltrexone (DEPADE) 50 mg tablet Take 1 Tab by mouth daily.  esomeprazole (NexIUM) 40 mg capsule Take 1 Cap by mouth daily for 90 days.  montelukast (SINGULAIR) 10 mg tablet Take 1 Tab by mouth daily.  fexofenadine (ALLEGRA) 180 mg tablet Take 0.5 Tabs by mouth daily.  hydrOXYzine HCL (ATARAX) 25 mg tablet Take  by mouth three (3) times daily as needed.  [DISCONTINUED] ondansetron (Zofran ODT) 4 mg disintegrating tablet 1 Tab by SubLINGual route every eight (8) hours as needed for Nausea or Vomiting. No current facility-administered medications on file prior to visit. No orders of the defined types were placed in this encounter. Review of Systems   Constitutional: Negative for chills, diaphoresis, fever, malaise/fatigue and weight loss. Musculoskeletal: Positive for joint pain (left shoulder) and myalgias. Negative for back pain, falls and neck pain. Neurological: Positive for tingling (left arm). Negative for tremors, sensory change, focal weakness and headaches. Visit Vitals  /86 (BP 1 Location: Left upper arm, BP Patient Position: Sitting, BP Cuff Size: Adult)   Pulse 75   Temp 98.1 °F (36.7 °C) (Tympanic)   Resp 18   Wt 214 lb (97.1 kg)   SpO2 98%   BMI 32.54 kg/m²       Physical Exam  Vitals signs and nursing note reviewed. Constitutional:       Appearance: Normal appearance. He is obese.    Cardiovascular:      Rate and Rhythm: Normal rate and regular rhythm. Pulses: Normal pulses. Heart sounds: Normal heart sounds. Pulmonary:      Effort: Pulmonary effort is normal.      Breath sounds: Normal breath sounds. Abdominal:      General: Bowel sounds are normal.      Palpations: Abdomen is soft. Tenderness: There is no abdominal tenderness. Musculoskeletal:         General: Tenderness (superior border left shoulder) present. Right lower leg: No edema. Left lower leg: No edema. Skin:     General: Skin is warm and dry. Capillary Refill: Capillary refill takes less than 2 seconds. Neurological:      Mental Status: He is alert and oriented to person, place, and time. Mental status is at baseline. 1. Injury of left shoulder, initial encounter  He will continue prednisone as directed. Hold NSAIDs. May use Tylenol as needed for additional relief  Encourage heat and range of motion as needed  Will provide work note for restrictions with no lifting greater than 5 pounds or overhead use of extremity  Unknown if this is going to be [employer] as he is checking with employer  Plan for follow-up in 1 week to re-evaluate or sooner for worsening symptoms    Patient verbalizes understanding of plan of care as discussed above    Follow-up and Dispositions    · Return in about 1 week (around 3/9/2021) for or sooner for worsening symptoms.

## 2021-03-04 NOTE — ED PROVIDER NOTES
EMERGENCY DEPARTMENT HISTORY AND PHYSICAL EXAM      Date: 3/1/2021  Patient Name: Vera Feliciano. History of Presenting Illness     Chief Complaint   Patient presents with    Shoulder Pain     shoulder pain since Saturday, states pulled something in shoulder pulling meat racks off Saturday, previous injury from working out in same shoulder. Pt rates pain 8/10. Pt OTC medications w/o relief. Pt cap refill and sensation intact/WNL. Pulses present. History Provided By: Patient    HPI: Jyothi Hatfield Sr., 30 y.o. male with a past medical history significant hypertension, hyperlipidemia and obesity presents to the ED with cc of shoulder pain x 1-2 days. States he was pulling boxes and thinks he may torn a ligament or tendon. Has increase sharp pain upon movement. Pain is sharp and intermittent and non-radiating, Pain scale is 7/10. No Hx of trauma or altercation, no fevers, no cough, no chest pain, no bleeding, no syncope and no numbness or tingling. Good palpable distal pulses of upper extremities   There are no other complaints, changes, or physical findings at this time. PCP: Twyla Campuzano NP    No current facility-administered medications on file prior to encounter. Current Outpatient Medications on File Prior to Encounter   Medication Sig Dispense Refill    fluticasone propionate (FLONASE) 50 mcg/actuation nasal spray 2 Sprays by Both Nostrils route daily as needed.  potassium chloride SR (KLOR-CON 10) 10 mEq tablet Take 1 Tab by mouth daily.  lactulose (CHRONULAC) 10 gram/15 mL solution Take 30ml by mouth twice daily 946 mL 0    naltrexone (DEPADE) 50 mg tablet Take 1 Tab by mouth daily. 30 Tab 1    esomeprazole (NexIUM) 40 mg capsule Take 1 Cap by mouth daily for 90 days. 90 Cap 0    montelukast (SINGULAIR) 10 mg tablet Take 1 Tab by mouth daily. 90 Tab 0    fexofenadine (ALLEGRA) 180 mg tablet Take 0.5 Tabs by mouth daily.  90 Tab 0    hydrOXYzine HCL (ATARAX) 25 mg tablet Take by mouth three (3) times daily as needed. Past History     Past Medical History:  Past Medical History:   Diagnosis Date    GERD (gastroesophageal reflux disease)     Irritable bowel syndrome 2018    Irritable bowel syndrome     Non-traumatic rhabdomyolysis 2020       Past Surgical History:  Past Surgical History:   Procedure Laterality Date    COLONOSCOPY N/A 2018    [facility]      biopsy taken of left arm infection/       Family History:  Family History   Problem Relation Age of Onset    Heart Disease Mother     Diabetes Mother     Liver Disease Father     Cancer Father        Social History:  Social History     Tobacco Use    Smoking status: Current Every Day Smoker     Packs/day: 0.50     Years: 2.00     Pack years: 1.00     Last attempt to quit:      Years since quittin.1    Smokeless tobacco: Never Used    Tobacco comment: 3 pks/week   Substance Use Topics    Alcohol use: Not Currently     Frequency: Never     Comment: Patient reports he has quit drinking alcohol x 1 month.  Drug use: Not Currently       Allergies: Allergies   Allergen Reactions    Cashew Nut Hives    Egg Derived Hives    Milk Other (comments)     GI issues    Onion Hives    Peanut Other (comments)     GI issues    Tomato Hives         Review of Systems     Review of Systems   Constitutional: Negative. HENT: Negative. Eyes: Negative. Respiratory: Negative. Cardiovascular: Negative. Gastrointestinal: Negative. Endocrine: Negative. Genitourinary: Negative. Musculoskeletal: Negative. Allergic/Immunologic: Negative. Neurological: Negative. Hematological: Negative. Psychiatric/Behavioral: Negative. Physical Exam     Physical Exam  Vitals signs and nursing note reviewed. Constitutional:       Appearance: Normal appearance. He is normal weight.    HENT:      Head: Normocephalic and atraumatic. Right Ear: Tympanic membrane normal.      Left Ear: Tympanic membrane normal.      Nose: Nose normal.      Mouth/Throat:      Mouth: Mucous membranes are moist.   Eyes:      Extraocular Movements: Extraocular movements intact. Pupils: Pupils are equal, round, and reactive to light. Neck:      Musculoskeletal: Normal range of motion and neck supple. Cardiovascular:      Rate and Rhythm: Normal rate and regular rhythm. Pulses: Normal pulses. Heart sounds: Normal heart sounds. Pulmonary:      Effort: Pulmonary effort is normal.      Breath sounds: Normal breath sounds. Abdominal:      General: Abdomen is flat. Bowel sounds are normal.      Palpations: Abdomen is soft. Musculoskeletal:         General: Swelling and tenderness present. Left shoulder: He exhibits decreased range of motion, tenderness, pain and spasm. He exhibits no effusion, no deformity, no laceration and normal pulse. Arms:    Skin:     General: Skin is warm and dry. Neurological:      General: No focal deficit present. Mental Status: He is alert and oriented to person, place, and time. Mental status is at baseline. Psychiatric:         Mood and Affect: Mood normal.         Behavior: Behavior normal.         Thought Content: Thought content normal.         Judgment: Judgment normal.         Lab and Diagnostic Study Results     Labs -   No results found for this or any previous visit (from the past 12 hour(s)). Radiologic Studies -   @lastxrresult@  CT Results  (Last 48 hours)    None        CXR Results  (Last 48 hours)    None            Medical Decision Making   - I am the first provider for this patient. - I reviewed the vital signs, available nursing notes, past medical history, past surgical history, family history and social history. - Initial assessment performed.  The patients presenting problems have been discussed, and they are in agreement with the care plan formulated and outlined with them. I have encouraged them to ask questions as they arise throughout their visit. Vital Signs-Reviewed the patient's vital signs. No data found. Records Reviewed: Nursing Notes    The patient presents with shoulder pain  with a differential diagnosis of  Fracture, dislocation, bursitis, tendonitis, and pancoast tumor    ED Course:   Patient's symptoms, examination and X-ray findings were noted and reviewed. Patient's condition is consistent with bursitis of the shoulder       Provider Notes (Medical Decision Making):   Patient's symptoms, examination and X-ray findings were noted and reviewed. Patient's condition is consistent with bursitis of the shoulder  MDM       Procedures   Medical Decision Makingedical Decision Making  Performed by: Vianca Jauregui MD  PROCEDURES:    None  Procedures       Disposition   Disposition: DC- Adult Discharges: All of the diagnostic tests were reviewed and questions answered. Diagnosis, care plan and treatment options were discussed. The patient understands the instructions and will follow up as directed. The patients results have been reviewed with them. They have been counseled regarding their diagnosis. The patient verbally convey understanding and agreement of the signs, symptoms, diagnosis, treatment and prognosis and additionally agrees to follow up as recommended with their PCP in 24 - 48 hours. They also agree with the care-plan and convey that all of their questions have been answered. I have also put together some discharge instructions for them that include: 1) educational information regarding their diagnosis, 2) how to care for their diagnosis at home, as well a 3) list of reasons why they would want to return to the ED prior to their follow-up appointment, should their condition change. Discharged    DISCHARGE PLAN:  1. Cannot display discharge medications since this patient is not currently admitted.     2. Follow-up Information     Follow up With Specialties Details Why Contact Alix Moon NP Physician Assistant Call  If symptoms worsen [address] [facility] [phone]          3. Return to ED if worse   4. Discharge Medication List as of 3/1/2021  2:27 PM      START taking these medications    Details   predniSONE (DELTASONE) 20 mg tablet Take 20 mg by mouth daily for 10 days. With Breakfast, Normal, Disp-10 Tab, R-0         CONTINUE these medications which have NOT CHANGED    Details   fluticasone propionate (FLONASE) 50 mcg/actuation nasal spray 2 Sprays by Both Nostrils route daily as needed., Historical Med      potassium chloride SR (KLOR-CON 10) 10 mEq tablet Take 1 Tab by mouth daily. , Historical Med      lactulose (CHRONULAC) 10 gram/15 mL solution Take 30ml by mouth twice daily, Normal, Disp-946 mL, R-0      naltrexone (DEPADE) 50 mg tablet Take 1 Tab by mouth daily. , Normal, Disp-30 Tab, R-1      esomeprazole (NexIUM) 40 mg capsule Take 1 Cap by mouth daily for 90 days. , Normal, Disp-90 Cap, R-0      montelukast (SINGULAIR) 10 mg tablet Take 1 Tab by mouth daily. , Normal, Disp-90 Tab, R-0      fexofenadine (ALLEGRA) 180 mg tablet Take 0.5 Tabs by mouth daily. , Normal, Disp-90 Tab, R-0      ondansetron (Zofran ODT) 4 mg disintegrating tablet 1 Tab by SubLINGual route every eight (8) hours as needed for Nausea or Vomiting., Normal, Disp-20 Tab, R-0      hydrOXYzine HCL (ATARAX) 25 mg tablet Take  by mouth three (3) times daily as needed., Historical Med               Diagnosis     Clinical Impression:   1. Bursitis and tendinitis of shoulder region        Attestations:    Elier Nicole MD    Please note that this dictation was completed with [employer], the [employer] voice recognition software. Quite often unanticipated grammatical, syntax, homophones, and other interpretive errors are inadvertently transcribed by the computer software. Please disregard these errors. Please excuse any errors that have escaped final proofreading. Thank you.

## 2021-03-10 DIAGNOSIS — Z88.9 H/O SEASONAL ALLERGIES: Primary | ICD-10-CM

## 2021-03-10 RX ORDER — FLUTICASONE PROPIONATE 50 MCG
2 SPRAY, SUSPENSION (ML) NASAL
Qty: 1 BOTTLE | Refills: 1 | Status: SHIPPED | OUTPATIENT
Start: 2021-03-10 | End: 2021-06-13

## 2021-03-12 ENCOUNTER — VIRTUAL VISIT (OUTPATIENT)
Dept: BEHAVIORAL/MENTAL HEALTH CLINIC | Age: 36
End: 2021-03-12
Payer: MEDICAID

## 2021-03-12 DIAGNOSIS — F10.10 ALCOHOL ABUSE: ICD-10-CM

## 2021-03-12 DIAGNOSIS — F33.1 MODERATE EPISODE OF RECURRENT MAJOR DEPRESSIVE DISORDER (HCC): Primary | ICD-10-CM

## 2021-03-12 PROCEDURE — 99204 OFFICE O/P NEW MOD 45 MIN: CPT | Performed by: NURSE PRACTITIONER

## 2021-03-12 RX ORDER — BUPROPION HYDROCHLORIDE 150 MG/1
150 TABLET, EXTENDED RELEASE ORAL 2 TIMES DAILY
Qty: 180 TAB | Refills: 0 | Status: SHIPPED | OUTPATIENT
Start: 2021-03-12 | End: 2021-06-10

## 2021-03-12 RX ORDER — NALTREXONE HYDROCHLORIDE 50 MG/1
50 TABLET, FILM COATED ORAL DAILY
Qty: 90 TAB | Refills: 0 | Status: SHIPPED | OUTPATIENT
Start: 2021-03-12 | End: 2021-06-10
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
## 2021-03-26 ENCOUNTER — HOSPITAL ENCOUNTER (EMERGENCY)
Age: 36
Discharge: HOME | End: 2021-03-26
Attending: EMERGENCY MEDICINE
Payer: MEDICAID

## 2021-03-26 VITALS
SYSTOLIC BLOOD PRESSURE: 161 MMHG | OXYGEN SATURATION: 97 % | DIASTOLIC BLOOD PRESSURE: 95 MMHG | TEMPERATURE: 98.1 F | RESPIRATION RATE: 18 BRPM | HEART RATE: 107 BPM

## 2021-03-26 DIAGNOSIS — M54.2 NECK PAIN: ICD-10-CM

## 2021-03-26 DIAGNOSIS — M25.512 PAIN IN JOINT OF LEFT SHOULDER: Primary | ICD-10-CM

## 2021-03-26 PROCEDURE — 74011250637 HC RX REV CODE- 250/637: Performed by: EMERGENCY MEDICINE

## 2021-03-26 PROCEDURE — 99283 EMERGENCY DEPT VISIT LOW MDM: CPT

## 2021-03-26 RX ORDER — CYCLOBENZAPRINE HCL 10 MG
10 TABLET ORAL
Status: COMPLETED | OUTPATIENT
Start: 2021-03-26 | End: 2021-03-26

## 2021-03-26 RX ORDER — IBUPROFEN 600 MG/1
600 TABLET ORAL
Status: COMPLETED | OUTPATIENT
Start: 2021-03-26 | End: 2021-03-26

## 2021-03-26 RX ORDER — CYCLOBENZAPRINE HCL 10 MG
10 TABLET ORAL
Qty: 9 TAB | Refills: 0 | Status: SHIPPED | OUTPATIENT
Start: 2021-03-26 | End: 2021-03-30 | Stop reason: SDUPTHER

## 2021-03-26 RX ADMIN — CYCLOBENZAPRINE 10 MG: 10 TABLET, FILM COATED ORAL at 15:55

## 2021-03-26 RX ADMIN — IBUPROFEN 600 MG: 600 TABLET ORAL at 15:56

## 2021-03-26 NOTE — LETTER
[address] 
[facility] EMERGENCY DEPT 
[address] 
[phone] Work/School Note Date: 3/26/2021 To Whom It May concern: 
 
Sandra Rosas Sr. was seen and treated today in the emergency room by the following provider(s): 
Attending Provider: Lisa Ford MD. Aracelis Eason is excused from work/school on 3/26/2021 through 3/29/2021. He is medically clear to return to work/school on 3/30/2021. Sincerely, Kwasi Claros MD

## 2021-03-26 NOTE — LETTER
[address] 
[facility] EMERGENCY DEPT 
[address] 
[phone] Work/School Note Date: 3/26/2021 To Whom It May concern: 
 
Nigel Tam Sr. was seen and treated today in the emergency room by the following provider(s): 
Attending Provider: Regina Jacinto MD. Karen Meyers is excused from work/school on 3/26/2021 through 3/29/2021. He is medically clear to return to work/school on 3/30/2021. Sincerely, Elvie Valle MD

## 2021-03-26 NOTE — ED PROVIDER NOTES
EMERGENCY DEPARTMENT HISTORY AND PHYSICAL EXAM      Date: 3/26/2021  Patient Name: Itzel Duran. History of Presenting Illness     Chief Complaint   Patient presents with    Shoulder Pain    Neck Pain       History Provided By: Patient    HPI: Damir Ramirez Sr., 30 y.o. male with a past medical history significant chronic left shoulder pain and neck pain presents to the ED with cc of left shoulder pain and neck  Patient schedule for to see orthopedic surgeon on Monday. He missed work today due pain. No recent trauma. Patient was at work yesterday it got worse. He is on predinsone. There are no other complaints, changes, or physical findings at this time. PCP: Al Mathew NP    No current facility-administered medications on file prior to encounter. Current Outpatient Medications on File Prior to Encounter   Medication Sig Dispense Refill    buPROPion SR (WELLBUTRIN SR) 150 mg SR tablet Take 1 Tab by mouth two (2) times a day for 90 days. 180 Tab 0    naltrexone (DEPADE) 50 mg tablet Take 1 Tab by mouth daily for 90 days. 90 Tab 0    fluticasone propionate (FLONASE) 50 mcg/actuation nasal spray 2 Sprays by Both Nostrils route daily as needed for Rhinitis. 1 Bottle 1    lactulose (CHRONULAC) 10 gram/15 mL solution Take 30ml by mouth twice daily 946 mL 0    esomeprazole (NexIUM) 40 mg capsule Take 1 Cap by mouth daily for 90 days. 90 Cap 0    montelukast (SINGULAIR) 10 mg tablet Take 1 Tab by mouth daily. 90 Tab 0    fexofenadine (ALLEGRA) 180 mg tablet Take 0.5 Tabs by mouth daily.  80 Tab 0       Past History     Past Medical History:  Past Medical History:   Diagnosis Date    GERD (gastroesophageal reflux disease)     Irritable bowel syndrome 6/25/2018    Irritable bowel syndrome     Non-traumatic rhabdomyolysis 12/24/2020       Past Surgical History:  Past Surgical History:   Procedure Laterality Date    COLONOSCOPY N/A 5/22/2018    COLONOSCOPY performed by Joan Maier MD at [address] HX OTHER SURGICAL      biopsy taken of left arm infection/       Family History:  Family History   Problem Relation Age of Onset    Heart Disease Mother     Diabetes Mother     Liver Disease Father     Cancer Father        Social History:  Social History     Tobacco Use    Smoking status: Current Every Day Smoker     Packs/day: 0.50     Years: 2.00     Pack years: 1.00     Last attempt to quit:      Years since quittin.2    Smokeless tobacco: Never Used    Tobacco comment: 3 pks/week   Substance Use Topics    Alcohol use: Not Currently     Frequency: Never     Comment: Patient reports he has quit drinking alcohol x 1 month.  Drug use: Not Currently     Types: Cocaine, Marijuana, Prescription       Allergies: Allergies   Allergen Reactions    Cashew Nut Hives    Egg Derived Hives    Milk Other (comments)     GI issues    Onion Hives    Peanut Other (comments)     GI issues    Tomato Hives         Review of Systems   Review of Systems   Constitutional: Negative. HENT: Negative. Eyes: Negative. Respiratory: Negative. Cardiovascular: Negative. Gastrointestinal: Negative. Endocrine: Negative. Genitourinary: Negative. Musculoskeletal: Positive for neck pain. Left shoulder pain   Skin: Negative. Allergic/Immunologic: Negative. Neurological: Negative. Hematological: Negative. Psychiatric/Behavioral: Negative. All other systems reviewed and are negative. Physical Exam     Physical Exam  Vitals signs and nursing note reviewed. Constitutional:       Appearance: Normal appearance. HENT:      Head: Normocephalic. Nose: Nose normal.      Mouth/Throat:      Mouth: Mucous membranes are moist.   Eyes:      Pupils: Pupils are equal, round, and reactive to light. Neck:      Musculoskeletal: Normal range of motion. Cardiovascular:      Rate and Rhythm: Normal rate.    Pulmonary:      Effort: Pulmonary effort is normal. Abdominal:      General: Abdomen is flat. Musculoskeletal: Normal range of motion. General: Tenderness present. Comments: Left shoulder. Limited ROM   Skin:     General: Skin is warm. Neurological:      General: No focal deficit present. Mental Status: He is alert and oriented to person, place, and time. Psychiatric:         Mood and Affect: Mood normal.         Lab and Diagnostic Study Results     Labs -   No results found for this or any previous visit (from the past 12 hour(s)). Radiologic Studies -   @lastxrresult@  CT Results  (Last 48 hours)    None        CXR Results  (Last 48 hours)    None            Medical Decision Making   - I am the first provider for this patient. - I reviewed the vital signs, available nursing notes, past medical history, past surgical history, family history and social history. - Initial assessment performed. The patients presenting problems have been discussed, and they are in agreement with the care plan formulated and outlined with them. I have encouraged them to ask questions as they arise throughout their visit. Vital Signs-Reviewed the patient's vital signs. Patient Vitals for the past 12 hrs:   Temp Pulse Resp BP SpO2   03/26/21 1417 98.1 °F (36.7 °C) (!) 107 18 (!) 161/95 97 %       Records Reviewed: Nursing Notes    The patient presents with left shoulder and neck pain with a differential diagnosis of arthitis. Sprain, trauma    ED Course:          Provider Notes (Medical Decision Making): MDM       Procedures   Medical Decision Makingedical Decision Making  Performed by: Gio Kaye MD  PROCEDURES  Procedures       Disposition   Disposition: DC- Adult Discharges: All of the diagnostic tests were reviewed and questions answered. Diagnosis, care plan and treatment options were discussed. The patient understands the instructions and will follow up as directed. The patients results have been reviewed with them.   They have been counseled regarding their diagnosis. The patient verbally convey understanding and agreement of the signs, symptoms, diagnosis, treatment and prognosis and additionally agrees to follow up as recommended with their PCP in 24 - 48 hours. They also agree with the care-plan and convey that all of their questions have been answered. I have also put together some discharge instructions for them that include: 1) educational information regarding their diagnosis, 2) how to care for their diagnosis at home, as well a 3) list of reasons why they would want to return to the ED prior to their follow-up appointment, should their condition change. DISCHARGE PLAN:  1. Current Discharge Medication List      CONTINUE these medications which have NOT CHANGED    Details   buPROPion SR (WELLBUTRIN SR) 150 mg SR tablet Take 1 Tab by mouth two (2) times a day for 90 days. Qty: 180 Tab, Refills: 0    Comments: Take 1 tablet at 9 AM and 1 tablet at 5 PM.  Associated Diagnoses: Moderate episode of recurrent major depressive disorder (HCC)      naltrexone (DEPADE) 50 mg tablet Take 1 Tab by mouth daily for 90 days. Qty: 90 Tab, Refills: 0    Associated Diagnoses: Alcohol abuse      fluticasone propionate (FLONASE) 50 mcg/actuation nasal spray 2 Sprays by Both Nostrils route daily as needed for Rhinitis. Qty: 1 Bottle, Refills: 1    Associated Diagnoses: H/O seasonal allergies      lactulose (CHRONULAC) 10 gram/15 mL solution Take 30ml by mouth twice daily  Qty: 946 mL, Refills: 0    Associated Diagnoses: Chronic constipation      esomeprazole (NexIUM) 40 mg capsule Take 1 Cap by mouth daily for 90 days. Qty: 90 Cap, Refills: 0    Associated Diagnoses: Acute gastritis without hemorrhage, unspecified gastritis type      montelukast (SINGULAIR) 10 mg tablet Take 1 Tab by mouth daily. Qty: 90 Tab, Refills: 0      fexofenadine (ALLEGRA) 180 mg tablet Take 0.5 Tabs by mouth daily.   Qty: 90 Tab, Refills: 0    Associated Diagnoses: H/O seasonal allergies           2. Follow-up Information    None       3. Return to ED if worse   4. Current Discharge Medication List            Diagnosis     Clinical Impression: No diagnosis found. ICD-10-CM ICD-9-CM    1. Pain in joint of left shoulder  M25.512 719.41    2. Neck pain  M54.2 723.1      Pat Juan MD    Please note that this dictation was completed with [employer], the computer voice recognition software. Quite often unanticipated grammatical, syntax, homophones, and other interpretive errors are inadvertently transcribed by the computer software. Please disregard these errors. Please excuse any errors that have escaped final proofreading. Thank you.

## 2021-03-26 NOTE — ED TRIAGE NOTES
Pt reports injury to left shoulder on 2/27/21 and is c/o pain that radiates to his neck. Pt states he has an appointment with Jp Lee on Monday but was unable to go to work due to the pain. Pt noted to have full ROM in left arm.

## 2021-03-29 PROBLEM — R00.2 HEART PALPITATIONS: Status: ACTIVE | Noted: 2018-06-25

## 2021-03-30 ENCOUNTER — OFFICE VISIT (OUTPATIENT)
Dept: PRIMARY CARE CLINIC | Age: 36
End: 2021-03-30
Payer: MEDICAID

## 2021-03-30 VITALS
DIASTOLIC BLOOD PRESSURE: 74 MMHG | OXYGEN SATURATION: 100 % | RESPIRATION RATE: 20 BRPM | HEART RATE: 99 BPM | WEIGHT: 214 LBS | SYSTOLIC BLOOD PRESSURE: 142 MMHG | BODY MASS INDEX: 32.54 KG/M2 | TEMPERATURE: 97.7 F

## 2021-03-30 DIAGNOSIS — K58.9 IRRITABLE BOWEL SYNDROME, UNSPECIFIED TYPE: Primary | ICD-10-CM

## 2021-03-30 DIAGNOSIS — M75.42 IMPINGEMENT SYNDROME OF LEFT SHOULDER: ICD-10-CM

## 2021-03-30 DIAGNOSIS — M54.2 NECK PAIN: ICD-10-CM

## 2021-03-30 PROCEDURE — 99213 OFFICE O/P EST LOW 20 MIN: CPT | Performed by: NURSE PRACTITIONER

## 2021-03-30 RX ORDER — PREDNISONE 20 MG/1
TABLET ORAL
COMMUNITY
Start: 2021-03-17 | End: 2021-03-30

## 2021-03-30 RX ORDER — CYCLOBENZAPRINE HCL 10 MG
10 TABLET ORAL
Qty: 30 TAB | Refills: 2 | Status: SHIPPED | OUTPATIENT
Start: 2021-03-30 | End: 2021-04-21 | Stop reason: ALTCHOICE

## 2021-03-30 RX ORDER — DICYCLOMINE HYDROCHLORIDE 10 MG/1
10 CAPSULE ORAL 3 TIMES DAILY
Qty: 90 CAP | Refills: 0 | Status: SHIPPED | OUTPATIENT
Start: 2021-03-30 | End: 2021-06-09 | Stop reason: ALTCHOICE

## 2021-03-30 NOTE — PROGRESS NOTES
Guillermina Waldron Sr. is a 30 y.o. male who presents to the office today for the following:    Chief Complaint   Patient presents with    Shoulder Pain     just received an injcetion yesterday and isorder to go to PT       Past Medical History:   Diagnosis Date    GERD (gastroesophageal reflux disease)     Irritable bowel syndrome 2018    Irritable bowel syndrome     Non-traumatic rhabdomyolysis 2020       Past Surgical History:   Procedure Laterality Date    COLONOSCOPY N/A 2018    COLONOSCOPY performed by Twyla Franklin MD at [facility] HX OTHER SURGICAL      biopsy taken of left arm infection/        Family History   Problem Relation Age of Onset    Heart Disease Mother     Diabetes Mother     Liver Disease Father     Cancer Father         Social History     Tobacco Use    Smoking status: Current Every Day Smoker     Packs/day: 0.50     Years: 2.00     Pack years: 1.00     Last attempt to quit: 2015     Years since quittin.2    Smokeless tobacco: Never Used    Tobacco comment: 3 pks/week   Substance Use Topics    Alcohol use: Not Currently     Frequency: Never     Comment: Patient reports he has quit drinking alcohol x 1 month.  Drug use: Not Currently     Types: Cocaine, Marijuana, Prescription        HPI  Patient here today with PMH of depression, IBS, GERD, allergies, ETOH abuse and recurrent rhabdomyolysis who complains of left shoulder pain. Reports that he is currently seeing orthopedic with workers comp. Had injection done yesterday in shoulder and feels that pain is worsened. Was put on light duty with workers comp but feels he is not able to even do light duty due to pain with lifting. No numbness but does have some weakness. Is supposed to also start physical therapy next week and not sure how he will work and do therapy. Also needs refill on his bentyl for IBS.     Current Outpatient Medications on File Prior to Visit   Medication Sig    buPROPion SR [facility] SR) 150 mg SR tablet Take 1 Tab by mouth two (2) times a day for 90 days. (Patient taking differently: Take 150 mg by mouth two (2) times a day. Mary Goff)    naltrexone (DEPADE) 50 mg tablet Take 1 Tab by mouth daily for 90 days. (Patient taking differently: Take 50 mg by mouth daily. Claudette Saint)    fluticasone propionate (FLONASE) 50 mcg/actuation nasal spray 2 Sprays by Both Nostrils route daily as needed for Rhinitis.  lactulose (CHRONULAC) 10 gram/15 mL solution Take 30ml by mouth twice daily    esomeprazole (NexIUM) 40 mg capsule Take 1 Cap by mouth daily for 90 days.  montelukast (SINGULAIR) 10 mg tablet Take 1 Tab by mouth daily.  fexofenadine (ALLEGRA) 180 mg tablet Take 0.5 Tabs by mouth daily.  [DISCONTINUED] predniSONE (DELTASONE) 20 mg tablet TAKE 1 TABLET BY MOUTH ONCE DAILY FOR 10 DAYS    [DISCONTINUED] cyclobenzaprine (FLEXERIL) 10 mg tablet Take 1 Tab by mouth three (3) times daily (with meals) for 3 days. No current facility-administered medications on file prior to visit. Medications Ordered Today   Medications    dicyclomine (BENTYL) 10 mg capsule     Sig: Take 1 Cap by mouth three (3) times daily. Dispense:  90 Cap     Refill:  0    cyclobenzaprine (FLEXERIL) 10 mg tablet     Sig: Take 1 Tab by mouth nightly. Dispense:  30 Tab     Refill:  2        Review of Systems   Constitutional: Negative. Respiratory: Negative. Cardiovascular: Negative. Gastrointestinal: Negative. Genitourinary: Negative. Musculoskeletal: Positive for joint pain and myalgias. Neurological: Positive for weakness. Negative for dizziness and headaches. Visit Vitals  BP (!) 142/74 (BP 1 Location: Left upper arm, BP Patient Position: Sitting, BP Cuff Size: Adult)   Pulse 99   Temp 97.7 °F (36.5 °C) (Tympanic)   Resp 20   Wt 214 lb (97.1 kg)   SpO2 100%   BMI 32.54 kg/m²       Physical Exam  Vitals signs and nursing note reviewed. Constitutional:       Appearance: Normal appearance. He is obese. Cardiovascular:      Rate and Rhythm: Normal rate and regular rhythm. Pulses: Normal pulses. Heart sounds: Normal heart sounds. Pulmonary:      Effort: Pulmonary effort is normal.      Breath sounds: Normal breath sounds. Abdominal:      General: Bowel sounds are normal.      Palpations: Abdomen is soft. Tenderness: There is no abdominal tenderness. Musculoskeletal:         General: Tenderness (superior border left shoulder, left trapezius) present. Left shoulder: He exhibits decreased range of motion and tenderness. Right lower leg: No edema. Left lower leg: No edema. Skin:     General: Skin is warm and dry. Neurological:      Mental Status: He is alert. 1. Irritable bowel syndrome, unspecified type    - dicyclomine (BENTYL) 10 mg capsule; Take 1 Cap by mouth three (3) times daily. Dispense: 90 Cap; Refill: 0    2. Impingement syndrome of left shoulder      3. Neck pain    - cyclobenzaprine (FLEXERIL) 10 mg tablet; Take 1 Tab by mouth nightly. Dispense: 30 Tab; Refill: 2      Reports MRI of neck and left shoulder done at [facility] in [place] but will have to get copies  Treatment with cortisone injection and physical therapy for shoulder impingement and neck pain  Did refill muscle relaxant to use prn (advised may cause drowsiness) and may use tylenol prn  Reports he was supposed to have anti-inflammatory sent in but advised due to h/o ulcers, should not be taking NSAIDS  Advised he will need to contact provider who is overseeing him for workers comp injury to discuss restrictions  Uses bentyl prn and refilled today      Patient verbalizes understanding of plan of care as discussed above    Follow-up and Dispositions    · Return if symptoms worsen or fail to improve.

## 2021-03-30 NOTE — PROGRESS NOTES
Chief Complaint   Patient presents with    Shoulder Pain     just received an injcetion yesterday and isorder to go to PT     just received an injection yesterday and isorder to go to PT

## 2021-03-30 NOTE — LETTER
NOTIFICATION RETURN TO WORK / SCHOOL 
 
3/30/2021 11:14 AM 
 
Mr. Brandon Celestin Sr. 
[address] [facility] [address] 
 
 
To Whom It May Concern: 
 
Brandon Celestin Sr. is currently under the care of [facility].He will return to work/school on: 4/5/21. 
 
 
 
If there are questions or concerns please have the patient contact our office. 
 
 
 
Sincerely, 
 
 
Jenn Singer NP

## 2021-03-30 NOTE — PROGRESS NOTES
1. Have you been to the ER, urgent care clinic since your last visit? Hospitalized since your last visit? [facility] 03/26    2. Have you seen or consulted any other health care providers outside of the [address] [place] [address] since your last visit? Include any pap smears or colon screening.  No

## 2021-04-08 PROBLEM — K21.9 GERD (GASTROESOPHAGEAL REFLUX DISEASE): Status: ACTIVE | Noted: 2021-04-08

## 2021-04-08 PROBLEM — K59.00 CONSTIPATION: Status: ACTIVE | Noted: 2021-04-08

## 2021-04-12 ENCOUNTER — OFFICE VISIT (OUTPATIENT)
Dept: GASTROENTEROLOGY | Age: 36
End: 2021-04-12
Payer: MEDICAID

## 2021-04-12 VITALS
HEIGHT: 68 IN | BODY MASS INDEX: 32.74 KG/M2 | WEIGHT: 216 LBS | OXYGEN SATURATION: 96 % | RESPIRATION RATE: 14 BRPM | DIASTOLIC BLOOD PRESSURE: 90 MMHG | TEMPERATURE: 98.3 F | SYSTOLIC BLOOD PRESSURE: 130 MMHG | HEART RATE: 97 BPM

## 2021-04-12 DIAGNOSIS — R10.11 RIGHT UPPER QUADRANT ABDOMINAL PAIN: ICD-10-CM

## 2021-04-12 DIAGNOSIS — R11.0 NAUSEA: ICD-10-CM

## 2021-04-12 DIAGNOSIS — R10.13 EPIGASTRIC PAIN: Primary | ICD-10-CM

## 2021-04-12 DIAGNOSIS — R14.0 BLOATING: ICD-10-CM

## 2021-04-12 PROCEDURE — 99204 OFFICE O/P NEW MOD 45 MIN: CPT | Performed by: INTERNAL MEDICINE

## 2021-04-12 RX ORDER — HYDROXYZINE 25 MG/1
25 TABLET, FILM COATED ORAL
COMMUNITY
End: 2022-01-06 | Stop reason: SDUPTHER

## 2021-04-12 NOTE — PROGRESS NOTES
1. Have you been to the ER, urgent care clinic since your last visit? Hospitalized since your last visit? NO    2. Have you seen or consulted any other health care providers outside of the [address] [place] [address] since your last visit? Include any pap smears or colon screening. NO    Chief Complaint   Patient presents with    GI Problem     gastritis     Visit Vitals  BP (!) 130/90 (BP 1 Location: Left upper arm, BP Patient Position: Sitting, BP Cuff Size: Large adult)   Pulse 97   Temp 98.3 °F (36.8 °C) (Skin)   Resp 14   Ht 5' 8\" (1.727 m)   Wt 98 kg (216 lb)   SpO2 96%   BMI 32.84 kg/m²     .

## 2021-04-12 NOTE — H&P (VIEW-ONLY)
Fanny Osman Sr. is a 30 y.o. male who presents today for the following: Chief Complaint Patient presents with  GI Problem  
  gastritis  Abdominal Pain [address]  Bloated Allergies Allergen Reactions  Cashew Nut Hives  Egg Derived Hives  Milk Other (comments) GI issues  Onion Hives  Peanut Other (comments) GI issues  Tomato Hives  Nsaids (Non-Steroidal Anti-Inflammatory Drug) Other (comments) H/o ulcers Current Outpatient Medications Medication Sig  
 hydrOXYzine HCL (ATARAX) 25 mg tablet Take 25 mg by mouth every six (6) hours as needed.  dicyclomine (BENTYL) 10 mg capsule Take 1 Cap by mouth three (3) times daily.  cyclobenzaprine (FLEXERIL) 10 mg tablet Take 1 Tab by mouth nightly.  buPROPion SR (WELLBUTRIN SR) 150 mg SR tablet Take 1 Tab by mouth two (2) times a day for 90 days. (Patient taking differently: Take 150 mg by mouth two (2) times a day. Caroline Smith)  naltrexone (DEPADE) 50 mg tablet Take 1 Tab by mouth daily for 90 days. (Patient taking differently: Take 50 mg by mouth daily. Adriana Waite)  fluticasone propionate (FLONASE) 50 mcg/actuation nasal spray 2 Sprays by Both Nostrils route daily as needed for Rhinitis.  lactulose (CHRONULAC) 10 gram/15 mL solution Take 30ml by mouth twice daily  esomeprazole (NexIUM) 40 mg capsule Take 1 Cap by mouth daily for 90 days.  montelukast (SINGULAIR) 10 mg tablet Take 1 Tab by mouth daily.  fexofenadine (ALLEGRA) 180 mg tablet Take 0.5 Tabs by mouth daily. No current facility-administered medications for this visit. Past Medical History:  
Diagnosis Date  Constipation 4/8/2021  GERD (gastroesophageal reflux disease)  Irritable bowel syndrome 6/25/2018  Irritable bowel syndrome  Non-traumatic rhabdomyolysis 12/24/2020 Past Surgical History:  
Procedure Laterality Date  COLONOSCOPY N/A 5/22/2018  COLONOSCOPY performed by Quirino Muñiz MD at [facility] [address] [employer] ENDOSCOPY VISIT-OUTPATIENT  2012  HX OTHER SURGICAL    
 biopsy taken of left arm infection/  
 
 
Family History Problem Relation Age of Onset  Heart Disease Mother  Diabetes Mother  Hypertension Mother  Liver Disease Father  Cancer Father  Prostate Cancer Father Social History Socioeconomic History  Marital status:  Spouse name: Not on file  Number of children: 3  
 Years of education: Not on file  Highest education level: 10th grade Occupational History  Not on file Social Needs  Financial resource strain: Not hard at all  Food insecurity Worry: Never true Inability: Never true  Transportation needs Medical: No  
  Non-medical: No  
Tobacco Use  Smoking status: Current Every Day Smoker Packs/day: 0.50 Years: 2.00 Pack years: 1.00 Last attempt to quit: 2015 Years since quittin.2  Smokeless tobacco: Never Used  Tobacco comment: 3 pks/week Substance and Sexual Activity  Alcohol use: Not Currently Frequency: Never Comment: Patient reports he has quit drinking alcohol x 1 month.  Drug use: Not Currently Types: Cocaine, Marijuana, Prescription  Sexual activity: Yes  
  Partners: Female Lifestyle  Physical activity Days per week: Not on file Minutes per session: Not on file  Stress: Not on file Relationships  Social connections Talks on phone: Not on file Gets together: Not on file Attends [employer] service: Not on file Active member of club or organization: Not on file Attends meetings of clubs or organizations: Not on file Relationship status: Not on file  Intimate partner violence Fear of current or ex partner: Not on file Emotionally abused: Not on file Physically abused: Not on file Forced sexual activity: Not on file Other Topics Concern  Not on file Social History Narrative  Not on file HPI 
41-year-old male with history of GERD and IBS who comes in for evaluation. Patient states he has a history of heavy alcohol use, but stopped for 3 months. He has sensation of a knot in the left upper quadrant and across the upper abdomen. The last bout was 1-1/2 weeks ago with no known causative factors. He has occasional nausea without vomiting. Bowel movements are regular and formed. He does have a problem with hot foods. He does have a knot allergy which causes rash. Patient states he had an EGD many years ago when he was around 24 to 25 years ago. Review of Systems Constitutional: Negative. HENT: Negative. Negative for nosebleeds. Eyes: Negative. Respiratory: Negative. Cardiovascular: Negative. Gastrointestinal: Positive for abdominal pain and nausea. Negative for blood in stool, constipation, diarrhea, melena and vomiting. Genitourinary: Negative. Musculoskeletal: Negative. Skin: Negative. Neurological: Negative. Endo/Heme/Allergies: Negative. Psychiatric/Behavioral: Negative. All other systems reviewed and are negative. Visit Vitals BP (!) 130/90 (BP 1 Location: Left upper arm, BP Patient Position: Sitting, BP Cuff Size: Large adult) Pulse 97 Temp 98.3 °F (36.8 °C) (Skin) Resp 14 Ht 5' 8\" (1.727 m) Wt 98 kg (216 lb) SpO2 96% BMI 32.84 kg/m² Physical Exam 
Vitals signs and nursing note reviewed. Constitutional:   
   General: He is not in acute distress. Appearance: Normal appearance. He is obese. He is not ill-appearing. HENT:  
   Head: Normocephalic and atraumatic. Nose: Nose normal.  
   Mouth/Throat:  
   Mouth: Mucous membranes are moist.  
   Pharynx: Oropharynx is clear. Eyes:  
   General: No scleral icterus. Extraocular Movements: Extraocular movements intact.   
   Conjunctiva/sclera: Conjunctivae normal.  
   Pupils: Pupils are equal, round, and reactive to light. Neck: Musculoskeletal: Normal range of motion and neck supple. Cardiovascular:  
   Rate and Rhythm: Normal rate and regular rhythm. Heart sounds: Normal heart sounds. Pulmonary:  
   Effort: Pulmonary effort is normal.  
   Breath sounds: Normal breath sounds. Abdominal:  
   General: Bowel sounds are normal. There is no distension. Palpations: Abdomen is soft. There is no mass. Tenderness: There is abdominal tenderness. There is guarding. There is no right CVA tenderness, left CVA tenderness or rebound. Hernia: No hernia is present. Musculoskeletal: Normal range of motion. Skin: 
   General: Skin is warm and dry. Coloration: Skin is not jaundiced. Neurological:  
   General: No focal deficit present. Mental Status: He is alert and oriented to person, place, and time. Psychiatric:     
   Mood and Affect: Mood normal.     
   Behavior: Behavior normal.     
   Thought Content: Thought content normal.     
   Judgment: Judgment normal.  
 
  
 
 
1. Epigastric pain - UPPER GI ENDOSCOPY,DIAGNOSIS; Future 2. Right upper quadrant abdominal pain - UPPER GI ENDOSCOPY,DIAGNOSIS; Future 3. Nausea - UPPER GI ENDOSCOPY,DIAGNOSIS; Future 4. Bloating - UPPER GI ENDOSCOPY,DIAGNOSIS; Future

## 2021-04-12 NOTE — PROGRESS NOTES
Aakash Jose Sr. is a 30 y.o. male who presents today for the following:  Chief Complaint   Patient presents with    GI Problem     gastritis    Abdominal Pain     GOES AND COMES    Bloated         Allergies   Allergen Reactions    Cashew Nut Hives    Egg Derived Hives    Milk Other (comments)     GI issues    Onion Hives    Peanut Other (comments)     GI issues    Tomato Hives    Nsaids (Non-Steroidal Anti-Inflammatory Drug) Other (comments)     H/o ulcers       Current Outpatient Medications   Medication Sig    hydrOXYzine HCL (ATARAX) 25 mg tablet Take 25 mg by mouth every six (6) hours as needed.  dicyclomine (BENTYL) 10 mg capsule Take 1 Cap by mouth three (3) times daily.  cyclobenzaprine (FLEXERIL) 10 mg tablet Take 1 Tab by mouth nightly.  buPROPion SR (WELLBUTRIN SR) 150 mg SR tablet Take 1 Tab by mouth two (2) times a day for 90 days. (Patient taking differently: Take 150 mg by mouth two (2) times a day. Santiago Gautam)    naltrexone (DEPADE) 50 mg tablet Take 1 Tab by mouth daily for 90 days. (Patient taking differently: Take 50 mg by mouth daily. Elsa Arana)    fluticasone propionate (FLONASE) 50 mcg/actuation nasal spray 2 Sprays by Both Nostrils route daily as needed for Rhinitis.  lactulose (CHRONULAC) 10 gram/15 mL solution Take 30ml by mouth twice daily    esomeprazole (NexIUM) 40 mg capsule Take 1 Cap by mouth daily for 90 days.  montelukast (SINGULAIR) 10 mg tablet Take 1 Tab by mouth daily.  fexofenadine (ALLEGRA) 180 mg tablet Take 0.5 Tabs by mouth daily. No current facility-administered medications for this visit.         Past Medical History:   Diagnosis Date    Constipation 4/8/2021    GERD (gastroesophageal reflux disease)     Irritable bowel syndrome 6/25/2018    Irritable bowel syndrome     Non-traumatic rhabdomyolysis 12/24/2020       Past Surgical History:   Procedure Laterality Date    COLONOSCOPY N/A 5/22/2018    COLONOSCOPY performed by Harshad Eng MD at [facility] ENDOSCOPY VISIT-OUTPATIENT  2012    HX OTHER SURGICAL      biopsy taken of left arm infection/       Family History   Problem Relation Age of Onset    Heart Disease Mother     Diabetes Mother     Hypertension Mother     Liver Disease Father     Cancer Father     Prostate Cancer Father        Social History     Socioeconomic History    Marital status:      Spouse name: Not on file    Number of children: 3    Years of education: Not on file    Highest education level: 10th grade   Occupational History    Not on file   Social Needs    Financial resource strain: Not hard at all   S[place] insecurity     Worry: Never true     Inability: Never true    Transportation needs     Medical: No     Non-medical: No   Tobacco Use    Smoking status: Current Every Day Smoker     Packs/day: 0.50     Years: 2.00     Pack years: 1.00     Last attempt to quit: 2015     Years since quittin.2    Smokeless tobacco: Never Used    Tobacco comment: 3 pks/week   Substance and Sexual Activity    Alcohol use: Not Currently     Frequency: Never     Comment: Patient reports he has quit drinking alcohol x 1 month.     Drug use: Not Currently     Types: Cocaine, Marijuana, Prescription    Sexual activity: Yes     Partners: Female   Lifestyle    Physical activity     Days per week: Not on file     Minutes per session: Not on file    Stress: Not on file   Relationships    Social connections     Talks on phone: Not on file     Gets together: Not on file     Attends [employer] service: Not on file     Active member of club or organization: Not on file     Attends meetings of clubs or organizations: Not on file     Relationship status: Not on file    Intimate partner violence     Fear of current or ex partner: Not on file     Emotionally abused: Not on file     Physically abused: Not on file     Forced sexual activity: Not on file   Other Topics Concern    Not on file Social History Narrative    Not on file         HPI  55-year-old male with history of GERD and IBS who comes in for evaluation. Patient states he has a history of heavy alcohol use, but stopped for 3 months. He has sensation of a knot in the left upper quadrant and across the upper abdomen. The last bout was 1-1/2 weeks ago with no known causative factors. He has occasional nausea without vomiting. Bowel movements are regular and formed. He does have a problem with hot foods. He does have a knot allergy which causes rash. Patient states he had an EGD many years ago when he was around 24 to 25 years ago. Review of Systems   Constitutional: Negative. HENT: Negative. Negative for nosebleeds. Eyes: Negative. Respiratory: Negative. Cardiovascular: Negative. Gastrointestinal: Positive for abdominal pain and nausea. Negative for blood in stool, constipation, diarrhea, melena and vomiting. Genitourinary: Negative. Musculoskeletal: Negative. Skin: Negative. Neurological: Negative. Endo/Heme/Allergies: Negative. Psychiatric/Behavioral: Negative. All other systems reviewed and are negative. Visit Vitals  BP (!) 130/90 (BP 1 Location: Left upper arm, BP Patient Position: Sitting, BP Cuff Size: Large adult)   Pulse 97   Temp 98.3 °F (36.8 °C) (Skin)   Resp 14   Ht 5' 8\" (1.727 m)   Wt 98 kg (216 lb)   SpO2 96%   BMI 32.84 kg/m²     Physical Exam  Vitals signs and nursing note reviewed. Constitutional:       General: He is not in acute distress. Appearance: Normal appearance. He is obese. He is not ill-appearing. HENT:      Head: Normocephalic and atraumatic. Nose: Nose normal.      Mouth/Throat:      Mouth: Mucous membranes are moist.      Pharynx: Oropharynx is clear. Eyes:      General: No scleral icterus. Extraocular Movements: Extraocular movements intact.       Conjunctiva/sclera: Conjunctivae normal.      Pupils: Pupils are equal, round, and reactive to light. Neck:      Musculoskeletal: Normal range of motion and neck supple. Cardiovascular:      Rate and Rhythm: Normal rate and regular rhythm. Heart sounds: Normal heart sounds. Pulmonary:      Effort: Pulmonary effort is normal.      Breath sounds: Normal breath sounds. Abdominal:      General: Bowel sounds are normal. There is no distension. Palpations: Abdomen is soft. There is no mass. Tenderness: There is abdominal tenderness. There is guarding. There is no right CVA tenderness, left CVA tenderness or rebound. Hernia: No hernia is present. Musculoskeletal: Normal range of motion. Skin:     General: Skin is warm and dry. Coloration: Skin is not jaundiced. Neurological:      General: No focal deficit present. Mental Status: He is alert and oriented to person, place, and time. Psychiatric:         Mood and Affect: Mood normal.         Behavior: Behavior normal.         Thought Content: Thought content normal.         Judgment: Judgment normal.            1. Epigastric pain    - UPPER GI ENDOSCOPY,DIAGNOSIS; Future    2. Right upper quadrant abdominal pain    - UPPER GI ENDOSCOPY,DIAGNOSIS; Future    3. Nausea    - UPPER GI ENDOSCOPY,DIAGNOSIS; Future    4.  Bloating    - UPPER GI ENDOSCOPY,DIAGNOSIS; Future

## 2021-04-14 ENCOUNTER — HOSPITAL ENCOUNTER (OUTPATIENT)
Dept: PREADMISSION TESTING | Age: 36
Discharge: HOME | End: 2021-04-14
Payer: MEDICAID

## 2021-04-14 LAB — SARS-COV-2, COV2: NORMAL

## 2021-04-14 PROCEDURE — U0003 INFECTIOUS AGENT DETECTION BY NUCLEIC ACID (DNA OR RNA); SEVERE ACUTE RESPIRATORY SYNDROME CORONAVIRUS 2 (SARS-COV-2) (CORONAVIRUS DISEASE [COVID-19]), AMPLIFIED PROBE TECHNIQUE, MAKING USE OF HIGH THROUGHPUT TECHNOLOGIES AS DESCRIBED BY CMS-2020-01-R: HCPCS

## 2021-04-15 LAB — SARS-COV-2, COV2NT: NOT DETECTED

## 2021-04-20 ENCOUNTER — ANESTHESIA (OUTPATIENT)
Dept: ENDOSCOPY | Age: 36
End: 2021-04-20
Payer: MEDICAID

## 2021-04-20 ENCOUNTER — ANESTHESIA EVENT (OUTPATIENT)
Dept: ENDOSCOPY | Age: 36
End: 2021-04-20
Payer: MEDICAID

## 2021-04-20 ENCOUNTER — HOSPITAL ENCOUNTER (OUTPATIENT)
Age: 36
Setting detail: OUTPATIENT SURGERY
Discharge: HOME | End: 2021-04-20
Attending: INTERNAL MEDICINE | Admitting: INTERNAL MEDICINE
Payer: MEDICAID

## 2021-04-20 VITALS
BODY MASS INDEX: 32.58 KG/M2 | HEART RATE: 84 BPM | WEIGHT: 215 LBS | TEMPERATURE: 97.3 F | SYSTOLIC BLOOD PRESSURE: 151 MMHG | DIASTOLIC BLOOD PRESSURE: 115 MMHG | HEIGHT: 68 IN | OXYGEN SATURATION: 95 % | RESPIRATION RATE: 20 BRPM

## 2021-04-20 PROCEDURE — 88305 TISSUE EXAM BY PATHOLOGIST: CPT

## 2021-04-20 PROCEDURE — 74011250636 HC RX REV CODE- 250/636: Performed by: NURSE ANESTHETIST, CERTIFIED REGISTERED

## 2021-04-20 PROCEDURE — 77030021593 HC FCPS BIOP ENDOSC BSC -A: Performed by: INTERNAL MEDICINE

## 2021-04-20 PROCEDURE — 76060000031 HC ANESTHESIA FIRST 0.5 HR: Performed by: INTERNAL MEDICINE

## 2021-04-20 PROCEDURE — 43239 EGD BIOPSY SINGLE/MULTIPLE: CPT | Performed by: INTERNAL MEDICINE

## 2021-04-20 PROCEDURE — 74011250636 HC RX REV CODE- 250/636: Performed by: INTERNAL MEDICINE

## 2021-04-20 PROCEDURE — 77030018831 HC SOL IRR H20 BAXT -A: Performed by: INTERNAL MEDICINE

## 2021-04-20 PROCEDURE — 76040000019: Performed by: INTERNAL MEDICINE

## 2021-04-20 PROCEDURE — 2709999900 HC NON-CHARGEABLE SUPPLY: Performed by: INTERNAL MEDICINE

## 2021-04-20 RX ORDER — SODIUM CHLORIDE 9 MG/ML
125 INJECTION, SOLUTION INTRAVENOUS CONTINUOUS
Status: DISCONTINUED | OUTPATIENT
Start: 2021-04-20 | End: 2021-04-20 | Stop reason: HOSPADM

## 2021-04-20 RX ORDER — PROPOFOL 10 MG/ML
INJECTION, EMULSION INTRAVENOUS AS NEEDED
Status: DISCONTINUED | OUTPATIENT
Start: 2021-04-20 | End: 2021-04-20 | Stop reason: HOSPADM

## 2021-04-20 RX ORDER — SODIUM CHLORIDE 0.9 % (FLUSH) 0.9 %
5-40 SYRINGE (ML) INJECTION EVERY 8 HOURS
Status: DISCONTINUED | OUTPATIENT
Start: 2021-04-20 | End: 2021-04-20 | Stop reason: HOSPADM

## 2021-04-20 RX ORDER — SODIUM CHLORIDE 0.9 % (FLUSH) 0.9 %
5-40 SYRINGE (ML) INJECTION AS NEEDED
Status: DISCONTINUED | OUTPATIENT
Start: 2021-04-20 | End: 2021-04-20 | Stop reason: HOSPADM

## 2021-04-20 RX ORDER — SODIUM CHLORIDE 9 MG/ML
INJECTION, SOLUTION INTRAVENOUS
Status: DISCONTINUED | OUTPATIENT
Start: 2021-04-20 | End: 2021-04-20 | Stop reason: HOSPADM

## 2021-04-20 RX ADMIN — PROPOFOL 200 MG: 10 INJECTION, EMULSION INTRAVENOUS at 13:05

## 2021-04-20 RX ADMIN — SODIUM CHLORIDE: 9 INJECTION, SOLUTION INTRAVENOUS at 13:00

## 2021-04-20 RX ADMIN — SODIUM CHLORIDE 125 ML/HR: 9 INJECTION, SOLUTION INTRAVENOUS at 11:08

## 2021-04-20 NOTE — OP NOTES
EGD Procedure Note        Patient: Daina Burgos Sr. MRN: [id number]  SSN: [id number]    YOB: 1985  Age: 30 y.o. Sex: male        Date/Time:  4/20/2021 1:17 PM         IMPRESSION:       1. Antral gastritis       RECOMMENDATIONS:    1. Check biopsy results. 2. Continue the esomeprazole 40 mg daily    Procedure: Esophagogastroduodenoscopy with cold biopsies    Indication: Epigastric abdominal pain, right upper quadrant pain, nausea    Endoscopist:  Romelia Gaming MD    Referring Provider:   Roge Petty NP    History: The history and physical exam were reviewed and updated. Endoscope: GIF H190 Olympus video endoscope    Extent of Exam: Second part of the duodenum    ASA: Grade 2    Anethesia/Sedation:  TIVA    Description of the procedure: The procedure was discussed with the patient including risks, benefits, alternatives including risks of iv sedation, bleeding, perforation and aspiration. A safety timeout was performed. The patient was placed in the left lateral decubitus position. A bite block was placed. The patient was using standard protocol. The patients vital signs were monitored at all times including heart rate/rhythm, blood pressure and oxygen saturation. The endoscope was then passed under direct visualization to the second part of the duodenum. The endoscope was then slowly withdrawn while visualizing the mucosa. In the stomach a retroflexion was performed and gastric fundus and cardia visualized. The patient was then transferred to recovery in stable condition. Findings:   Esophagus: The esophageal mucosa was normal with no ulceration, mass or stricture. There was no evidence of Aaron's esophagus or reflux esophagitis  Stomach: The gastric mucosa was inflamed in the petechial type pattern throughout the gastric antrum. Multiple biopsies were taken in the antrum of this finding.   Duodenum: The duodenum mucosa was normal with no ulceration, mass, stricture and no evidence of villous atrophy. Therapies: None    Specimens:   ID Type Source Tests Collected by Time Destination   1 :  Preservative Stomach, Antrum  Perry Claros MD 4/20/2021 1308 Pathology              EBL: Minimal    Complications:   None; patient tolerated the procedure well.      Implants: None    Discharge disposition:  Out of the recovery area when discharge criteria met         Pamela Neal MD  April 20, 2021  1:17 PM

## 2021-04-20 NOTE — DISCHARGE INSTRUCTIONS

## 2021-04-20 NOTE — ANESTHESIA POSTPROCEDURE EVALUATION
Procedure(s):  ESOPHAGOGASTRODUODENOSCOPY (EGD) (TIVA).     total IV anesthesia    Anesthesia Post Evaluation      Multimodal analgesia: multimodal analgesia used between 6 hours prior to anesthesia start to PACU discharge  Patient location during evaluation: PACU  Patient participation: complete - patient participated  Level of consciousness: awake  Pain management: adequate  Airway patency: patent  Anesthetic complications: no  Cardiovascular status: acceptable  Respiratory status: acceptable  Hydration status: acceptable  Post anesthesia nausea and vomiting:  none  Final Post Anesthesia Temperature Assessment:  Normothermia (36.0-37.5 degrees C)      INITIAL Post-op Vital signs:   Vitals Value Taken Time   /116 04/20/21 1322   Temp 36.3 °C (97.3 °F) 04/20/21 1322   Pulse 91 04/20/21 1322   Resp 20 04/20/21 1322   SpO2 94 % 04/20/21 1322

## 2021-04-20 NOTE — ANESTHESIA PREPROCEDURE EVALUATION
Relevant Problems   GASTROINTESTINAL   (+) GERD (gastroesophageal reflux disease)       Anesthetic History   No history of anesthetic complications            Review of Systems / Medical History  Patient summary reviewed, nursing notes reviewed and pertinent labs reviewed    Pulmonary  Within defined limits                 Neuro/Psych   Within defined limits           Cardiovascular  Within defined limits                     GI/Hepatic/Renal                Endo/Other  Within defined limits           Other Findings              Physical Exam    Airway  Mallampati: II  TM Distance: 4 - 6 cm  Neck ROM: normal range of motion        Cardiovascular  Regular rate and rhythm,  S1 and S2 normal,  no murmur, click, rub, or gallop             Dental         Pulmonary  Breath sounds clear to auscultation               Abdominal  Abdominal exam normal       Other Findings            Anesthetic Plan    ASA: 2  Anesthesia type: total IV anesthesia            Anesthetic plan and risks discussed with: Patient

## 2021-04-21 ENCOUNTER — OFFICE VISIT (OUTPATIENT)
Dept: PRIMARY CARE CLINIC | Age: 36
End: 2021-04-21
Payer: MEDICAID

## 2021-04-21 VITALS
SYSTOLIC BLOOD PRESSURE: 144 MMHG | BODY MASS INDEX: 32.69 KG/M2 | HEART RATE: 77 BPM | TEMPERATURE: 97.1 F | RESPIRATION RATE: 18 BRPM | WEIGHT: 215 LBS | OXYGEN SATURATION: 98 % | DIASTOLIC BLOOD PRESSURE: 82 MMHG

## 2021-04-21 DIAGNOSIS — I10 ESSENTIAL HYPERTENSION: Primary | ICD-10-CM

## 2021-04-21 DIAGNOSIS — E66.09 CLASS 1 OBESITY DUE TO EXCESS CALORIES WITH SERIOUS COMORBIDITY AND BODY MASS INDEX (BMI) OF 32.0 TO 32.9 IN ADULT: ICD-10-CM

## 2021-04-21 PROBLEM — K29.00 ACUTE GASTRITIS WITHOUT HEMORRHAGE: Status: ACTIVE | Noted: 2021-04-21

## 2021-04-21 PROBLEM — M75.42 IMPINGEMENT SYNDROME OF LEFT SHOULDER: Status: ACTIVE | Noted: 2021-04-21

## 2021-04-21 PROBLEM — K59.09 CHRONIC CONSTIPATION: Status: ACTIVE | Noted: 2021-04-08

## 2021-04-21 PROBLEM — F41.9 ANXIETY AND DEPRESSION: Status: ACTIVE | Noted: 2021-04-21

## 2021-04-21 PROBLEM — R00.2 HEART PALPITATIONS: Status: RESOLVED | Noted: 2018-06-25 | Resolved: 2021-04-21

## 2021-04-21 PROBLEM — F10.10 ALCOHOL ABUSE: Status: ACTIVE | Noted: 2021-04-21

## 2021-04-21 PROBLEM — F32.A ANXIETY AND DEPRESSION: Status: ACTIVE | Noted: 2021-04-21

## 2021-04-21 PROCEDURE — 99214 OFFICE O/P EST MOD 30 MIN: CPT | Performed by: NURSE PRACTITIONER

## 2021-04-21 RX ORDER — AMLODIPINE BESYLATE 2.5 MG/1
2.5 TABLET ORAL DAILY
Qty: 90 TAB | Refills: 0 | Status: SHIPPED | OUTPATIENT
Start: 2021-04-21 | End: 2022-01-17 | Stop reason: SDUPTHER

## 2021-04-21 NOTE — PROGRESS NOTES
Micheal Perez Sr. is a 30 y.o. male who presents to the office today for the following:    Chief Complaint   Patient presents with    Hypertension       Past Medical History:   Diagnosis Date    Acute gastritis without hemorrhage 4/21/2021    Alcohol abuse 4/21/2021    Anxiety and depression 4/21/2021    Class 1 obesity due to excess calories with serious comorbidity and body mass index (BMI) of 32.0 to 32.9 in adult 4/21/2021    Constipation 4/8/2021    Essential hypertension 4/21/2021    GERD (gastroesophageal reflux disease)     Impingement syndrome of left shoulder 4/21/2021    Irritable bowel syndrome 6/25/2018    Irritable bowel syndrome     Non-traumatic rhabdomyolysis 12/24/2020       Past Surgical History:   Procedure Laterality Date    COLONOSCOPY N/A 5/22/2018    COLONOSCOPY performed by Moises Marte MD at [address] [place] [address] ENDOSCOPY VISIT-OUTPATIENT  2012    HX OTHER SURGICAL      biopsy taken of left arm infection/        Family History   Problem Relation Age of Onset    Heart Disease Mother     Diabetes Mother     Hypertension Mother     Liver Disease Father     Cancer Father     Prostate Cancer Father         Social History     Tobacco Use    Smoking status: Former Smoker     Packs/day: 0.50     Years: 20.00     Pack years: 10.00    Smokeless tobacco: Never Used    Tobacco comment: 3 pks/week   Substance Use Topics    Alcohol use: Not Currently     Frequency: Never     Comment: Patient reports he has quit drinking alcohol x 1 month.  Drug use: Not Currently     Types: Cocaine, Marijuana, Prescription     Comment: 3 months ago marijuana        HPI  Patient with PMH of etoh dependence, GERD, obesity,recurrent rhabdomyolysis, IBS and anxiety/depression with complaint of high blood pressure. Recently had upper endoscopy with Dr. Jeffrey García and was told that blood pressure was high. Also told this by his neurologist when he last saw them.      Current Outpatient Medications on File Prior to Visit   Medication Sig    hydrOXYzine HCL (ATARAX) 25 mg tablet Take 25 mg by mouth every six (6) hours as needed.  dicyclomine (BENTYL) 10 mg capsule Take 1 Cap by mouth three (3) times daily.  buPROPion SR (WELLBUTRIN SR) 150 mg SR tablet Take 1 Tab by mouth two (2) times a day for 90 days. (Patient taking differently: Take 150 mg by mouth two (2) times a day. Es Miguel)    naltrexone (DEPADE) 50 mg tablet Take 1 Tab by mouth daily for 90 days. (Patient taking differently: Take 50 mg by mouth daily. Tsosie Specking)    fluticasone propionate (FLONASE) 50 mcg/actuation nasal spray 2 Sprays by Both Nostrils route daily as needed for Rhinitis.  esomeprazole (NexIUM) 40 mg capsule Take 1 Cap by mouth daily for 90 days.  montelukast (SINGULAIR) 10 mg tablet Take 1 Tab by mouth daily.  fexofenadine (ALLEGRA) 180 mg tablet Take 0.5 Tabs by mouth daily.  [DISCONTINUED] cyclobenzaprine (FLEXERIL) 10 mg tablet Take 1 Tab by mouth nightly.  [DISCONTINUED] lactulose (CHRONULAC) 10 gram/15 mL solution Take 30ml by mouth twice daily     Current Facility-Administered Medications on File Prior to Visit   Medication    [DISCONTINUED] sodium chloride (NS) flush 5-40 mL    [DISCONTINUED] sodium chloride (NS) flush 5-40 mL    [DISCONTINUED] 0.9% sodium chloride infusion    [DISCONTINUED] 0.9% sodium chloride infusion    [DISCONTINUED] propofoL (DIPRIVAN) 10 mg/mL injection        Medications Ordered Today   Medications    amLODIPine (NORVASC) 2.5 mg tablet     Sig: Take 1 Tab by mouth daily. Dispense:  90 Tab     Refill:  0        Review of Systems   Constitutional: Negative. Eyes: Negative. Respiratory: Negative. Cardiovascular: Negative. Gastrointestinal: Negative. Genitourinary: Negative. Musculoskeletal: Positive for joint pain and myalgias. Neurological: Negative.            Visit Vitals  BP (!) 144/82 (BP 1 Location: Left upper arm, BP Patient Position: Sitting, BP Cuff Size: Large adult)   Pulse 77   Temp 97.1 °F (36.2 °C) (Tympanic)   Resp 18   Wt 215 lb (97.5 kg)   SpO2 98%   BMI 32.69 kg/m²       Physical Exam  Vitals signs and nursing note reviewed. Constitutional:       Appearance: Normal appearance. He is obese. Eyes:      Pupils: Pupils are equal, round, and reactive to light. Cardiovascular:      Rate and Rhythm: Normal rate and regular rhythm. Pulses: Normal pulses. Heart sounds: Normal heart sounds. Pulmonary:      Effort: Pulmonary effort is normal.      Breath sounds: Normal breath sounds. Abdominal:      General: Bowel sounds are normal.      Palpations: Abdomen is soft. Tenderness: There is no abdominal tenderness. There is no guarding or rebound. Hernia: No hernia is present. Musculoskeletal: Normal range of motion. Right lower leg: No edema. Left lower leg: No edema. Skin:     General: Skin is warm and dry. Neurological:      Mental Status: He is alert and oriented to person, place, and time. Mental status is at baseline. 1. Essential hypertension  Blood pressure readings at our prior visits have been much lower than noted with Dr. Burton Santana but still in a borderline range. Will start on low dose of amlodipine 2.5mg daily and reviewed side effects  Encouraged to obtain a home meter to check at home and keep log of them to bring to next visit. He has had recent labs. Notify if systolic > 596 or diastolic > 427  Plan to re-evaluate around 1 month or sooner if needed  - amLODIPine (NORVASC) 2.5 mg tablet; Take 1 Tab by mouth daily. Dispense: 90 Tab; Refill: 0    2. Obesity   Continue to encourage weight loss. Recommend decreasing excess fat, salt and sugar in diet along with getting regular exercise 3-5 times weekly for 30-45 minutes consistently.         Patient verbalizes understanding of plan of care as discussed above    Follow-up and Dispositions    · Return in about 4 weeks (around 5/19/2021) for or sooner for worsening symptoms.

## 2021-04-21 NOTE — PROGRESS NOTES
1. Have you been to the ER, urgent care clinic since your last visit? Hospitalized since your last visit? No    2. Have you seen or consulted any other health care providers outside of the [address] [place] [address] since your last visit? Include any pap smears or colon screening.  No

## 2021-04-25 NOTE — PROGRESS NOTES
Tell patient that the biopsies taken and his stomach showed gastritis/inflammation. He should continue the esomeprazole daily. If symptoms persist please give him a follow-up visit.

## 2021-04-30 NOTE — PROGRESS NOTES
called pt and was informed of the results, he will call and make a F/U appt if he has any more issues

## 2021-05-25 ENCOUNTER — OFFICE VISIT (OUTPATIENT)
Dept: PRIMARY CARE CLINIC | Age: 36
End: 2021-05-25
Payer: MEDICAID

## 2021-05-25 VITALS
DIASTOLIC BLOOD PRESSURE: 69 MMHG | SYSTOLIC BLOOD PRESSURE: 115 MMHG | RESPIRATION RATE: 18 BRPM | OXYGEN SATURATION: 98 % | TEMPERATURE: 97.9 F | BODY MASS INDEX: 31.93 KG/M2 | WEIGHT: 210 LBS | HEART RATE: 91 BPM

## 2021-05-25 DIAGNOSIS — I10 ESSENTIAL HYPERTENSION: Primary | ICD-10-CM

## 2021-05-25 DIAGNOSIS — R25.2 MUSCLE CRAMPS: ICD-10-CM

## 2021-05-25 PROCEDURE — 99213 OFFICE O/P EST LOW 20 MIN: CPT | Performed by: NURSE PRACTITIONER

## 2021-05-25 NOTE — PROGRESS NOTES
Jeffrey Abreu Sr. is a [address] y.o. male who presents to the office today for the following:    Chief Complaint   Patient presents with    Hypertension    Medication Evaluation       Past Medical History:   Diagnosis Date    Acute gastritis without hemorrhage 4/21/2021    Alcohol abuse 4/21/2021    Anxiety and depression 4/21/2021    Class 1 obesity due to excess calories with serious comorbidity and body mass index (BMI) of 32.0 to 32.9 in adult 4/21/2021    Constipation 4/8/2021    Essential hypertension 4/21/2021    GERD (gastroesophageal reflux disease)     Impingement syndrome of left shoulder 4/21/2021    Irritable bowel syndrome 6/25/2018    Irritable bowel syndrome     Non-traumatic rhabdomyolysis 12/24/2020       Past Surgical History:   Procedure Laterality Date    COLONOSCOPY N/A 5/22/2018    COLONOSCOPY performed by Alva Albarran MD at [facility] ENDOSCOPY VISIT-OUTPATIENT  2012    HX OTHER SURGICAL      biopsy taken of left arm infection/        Family History   Problem Relation Age of Onset    Heart Disease Mother     Diabetes Mother     Hypertension Mother     Liver Disease Father     Cancer Father     Prostate Cancer Father         Social History     Tobacco Use    Smoking status: Former Smoker     Packs/day: 0.50     Years: 20.00     Pack years: 10.00    Smokeless tobacco: Never Used    Tobacco comment: 3 pks/week   Vaping Use    Vaping Use: Former   Substance Use Topics    Alcohol use: Not Currently     Comment: Patient reports he has quit drinking alcohol x 1 month.  Drug use: Not Currently     Types: Cocaine, Marijuana, Prescription     Comment: 3 months ago marijuana        HPI  Patient here for 1 month follow up of hypertension with PMH of etoh dependence, GERD, obesity,recurrent rhabdomyolysis, muscle cramps, IBS and anxiety/depression. States that he is doing well with the amlodipine and blood pressure is improved with home readings.  Does still have intermittent muscle cramps but these have been less often due to not working as many hours. Is supposed to follow up with neurologist but not sure of the date. Current Outpatient Medications on File Prior to Visit   Medication Sig    amLODIPine (NORVASC) 2.5 mg tablet Take 1 Tab by mouth daily.  hydrOXYzine HCL (ATARAX) 25 mg tablet Take 25 mg by mouth every six (6) hours as needed.  dicyclomine (BENTYL) 10 mg capsule Take 1 Cap by mouth three (3) times daily.  buPROPion SR (WELLBUTRIN SR) 150 mg SR tablet Take 1 Tab by mouth two (2) times a day for 90 days. (Patient taking differently: Take 150 mg by mouth two (2) times a day. Bernida List)    naltrexone (DEPADE) 50 mg tablet Take 1 Tab by mouth daily for 90 days. (Patient taking differently: Take 50 mg by mouth daily. Chika Face)    fluticasone propionate (FLONASE) 50 mcg/actuation nasal spray 2 Sprays by Both Nostrils route daily as needed for Rhinitis.  montelukast (SINGULAIR) 10 mg tablet Take 1 Tab by mouth daily.  fexofenadine (ALLEGRA) 180 mg tablet Take 0.5 Tabs by mouth daily. No current facility-administered medications on file prior to visit. No orders of the defined types were placed in this encounter. Review of Systems   Constitutional: Negative. Eyes: Negative. Respiratory: Negative. Cardiovascular: Negative. Gastrointestinal: Negative. Genitourinary: Negative. Musculoskeletal: Positive for joint pain (shoulder) and myalgias. Neurological: Negative. Visit Vitals  /69 (BP 1 Location: Left upper arm, BP Patient Position: Sitting, BP Cuff Size: Adult)   Pulse 91   Temp 97.9 °F (36.6 °C) (Oral)   Resp 18   Wt 210 lb (95.3 kg)   SpO2 98%   BMI 31.93 kg/m²       Physical Exam  Vitals and nursing note reviewed. Constitutional:       Appearance: Normal appearance. He is obese.    Pulmonary:      Effort: Pulmonary effort is normal.   Neurological:      Mental Status: He is alert and oriented to person, place, and time. Mental status is at baseline. Gait: Gait normal.   Psychiatric:         Mood and Affect: Mood normal.         Behavior: Behavior normal.            1. Essential hypertension  Blood pressure is controlled and continue medication as directed  Monitor at home and notify provider if staying above 140/90    2. Muscle cramps  This is improved and has not had any recurrences of rhabdomyolysis  He is planning to see neurologist again but not sure of appointment date so he will contact their office      Patient verbalizes understanding of plan of care as discussed above    Follow-up and Dispositions    · Return in about 3 months (around 8/25/2021) for or sooner for worsening symptoms.

## 2021-06-04 RX ORDER — HYOSCYAMINE SULFATE 0.38 MG/1
TABLET, EXTENDED RELEASE ORAL
Qty: 60 TABLET | Refills: 0 | OUTPATIENT
Start: 2021-06-04

## 2021-06-09 RX ORDER — HYOSCYAMINE SULFATE 0.38 MG/1
375 TABLET, EXTENDED RELEASE ORAL
Qty: 60 TABLET | Refills: 2 | Status: SHIPPED | OUTPATIENT
Start: 2021-06-09 | End: 2021-11-04 | Stop reason: SDUPTHER

## 2021-06-09 NOTE — TELEPHONE ENCOUNTER
Pt wife called and stated the this medication works better than the new medication so he would like this filled instead.  She said she can be reached @ [phone]

## 2021-06-10 DIAGNOSIS — Z88.9 H/O SEASONAL ALLERGIES: ICD-10-CM

## 2021-06-13 RX ORDER — FLUTICASONE PROPIONATE 50 MCG
SPRAY, SUSPENSION (ML) NASAL
Qty: 16 G | Refills: 0 | Status: SHIPPED | OUTPATIENT
Start: 2021-06-13 | End: 2021-08-19

## 2021-07-12 DIAGNOSIS — Z88.9 H/O SEASONAL ALLERGIES: ICD-10-CM

## 2021-07-13 DIAGNOSIS — F33.9 RECURRENT MAJOR DEPRESSIVE DISORDER, REMISSION STATUS UNSPECIFIED (HCC): Primary | ICD-10-CM

## 2021-07-13 DIAGNOSIS — F10.10 ALCOHOL ABUSE: ICD-10-CM

## 2021-07-13 DIAGNOSIS — F33.9 RECURRENT MAJOR DEPRESSIVE DISORDER, REMISSION STATUS UNSPECIFIED (HCC): ICD-10-CM

## 2021-07-13 RX ORDER — NALTREXONE HYDROCHLORIDE 50 MG/1
50 TABLET, FILM COATED ORAL DAILY
Qty: 90 TABLET | Refills: 0 | Status: SHIPPED | OUTPATIENT
Start: 2021-07-13 | End: 2021-10-11

## 2021-07-13 RX ORDER — BUPROPION HYDROCHLORIDE 150 MG/1
150 TABLET, EXTENDED RELEASE ORAL 2 TIMES DAILY
Qty: 180 TABLET | Refills: 0 | Status: SHIPPED | OUTPATIENT
Start: 2021-07-13 | End: 2021-07-13 | Stop reason: SDUPTHER

## 2021-07-13 RX ORDER — NALTREXONE HYDROCHLORIDE 50 MG/1
50 TABLET, FILM COATED ORAL DAILY
Qty: 90 TABLET | Refills: 0 | Status: SHIPPED | OUTPATIENT
Start: 2021-07-13 | End: 2021-07-13 | Stop reason: SDUPTHER

## 2021-07-13 RX ORDER — BUPROPION HYDROCHLORIDE 150 MG/1
150 TABLET, EXTENDED RELEASE ORAL 2 TIMES DAILY
Qty: 180 TABLET | Refills: 0 | Status: SHIPPED | OUTPATIENT
Start: 2021-07-13 | End: 2021-10-11

## 2021-07-13 NOTE — TELEPHONE ENCOUNTER
Requested Prescriptions     Pending Prescriptions Disp Refills    buPROPion SR (WELLBUTRIN SR) 150 mg SR tablet 180 Tablet 0     Sig: Take 1 Tablet by mouth two (2) times a day for 90 days.  naltrexone (DEPADE) 50 mg tablet 90 Tablet 0     Sig: Take 1 Tablet by mouth daily for 90 days. Patient is requesting a refill on the medication.

## 2021-07-16 RX ORDER — MINERAL OIL
90 ENEMA (ML) RECTAL DAILY
Qty: 90 TABLET | Refills: 0 | Status: SHIPPED | OUTPATIENT
Start: 2021-07-16 | End: 2022-02-17

## 2021-08-17 ENCOUNTER — HOSPITAL ENCOUNTER (EMERGENCY)
Age: 36
Discharge: HOME | End: 2021-08-17
Attending: EMERGENCY MEDICINE | Admitting: EMERGENCY MEDICINE
Payer: MEDICAID

## 2021-08-17 VITALS
OXYGEN SATURATION: 98 % | HEIGHT: 68 IN | DIASTOLIC BLOOD PRESSURE: 81 MMHG | TEMPERATURE: 98 F | BODY MASS INDEX: 32.58 KG/M2 | HEART RATE: 89 BPM | RESPIRATION RATE: 16 BRPM | SYSTOLIC BLOOD PRESSURE: 110 MMHG | WEIGHT: 215 LBS

## 2021-08-17 DIAGNOSIS — E86.0 DEHYDRATION: Primary | ICD-10-CM

## 2021-08-17 LAB
ALBUMIN SERPL-MCNC: 5.5 G/DL (ref 3.5–5)
ALBUMIN/GLOB SERPL: 1.3 {RATIO} (ref 1.1–2.2)
ALP SERPL-CCNC: 84 U/L (ref 45–117)
ALT SERPL-CCNC: 36 U/L (ref 12–78)
ANION GAP SERPL CALC-SCNC: 10 MMOL/L (ref 5–15)
AST SERPL W P-5'-P-CCNC: 25 U/L (ref 15–37)
BASOPHILS # BLD: 0.1 K/UL (ref 0–0.2)
BASOPHILS NFR BLD: 1 % (ref 0–2.5)
BILIRUB SERPL-MCNC: 0.6 MG/DL (ref 0.2–1)
BUN SERPL-MCNC: 10 MG/DL (ref 6–20)
BUN/CREAT SERPL: 3 (ref 12–20)
CA-I BLD-MCNC: 11.4 MG/DL (ref 8.5–10.1)
CHLORIDE SERPL-SCNC: 100 MMOL/L (ref 97–108)
CO2 SERPL-SCNC: 31 MMOL/L (ref 21–32)
CREAT SERPL-MCNC: 3.4 MG/DL (ref 0.7–1.3)
EOSINOPHIL # BLD: 0.1 K/UL (ref 0–0.7)
EOSINOPHIL NFR BLD: 1 % (ref 0.9–2.9)
ERYTHROCYTE [DISTWIDTH] IN BLOOD BY AUTOMATED COUNT: 13.6 % (ref 11.5–14.5)
GLOBULIN SER CALC-MCNC: 4.4 G/DL (ref 2–4)
GLUCOSE SERPL-MCNC: 91 MG/DL (ref 65–100)
HCT VFR BLD AUTO: 46.7 % (ref 41–53)
HGB BLD-MCNC: 16.7 G/DL (ref 13.5–17.5)
LACTATE SERPL-SCNC: 1.4 MMOL/L (ref 0.4–2)
LYMPHOCYTES # BLD: 3.9 K/UL (ref 1–4.8)
LYMPHOCYTES NFR BLD: 27 % (ref 20.5–51.1)
MAGNESIUM SERPL-MCNC: 2.1 MG/DL (ref 1.6–2.4)
MCH RBC QN AUTO: 29.8 PG (ref 31–34)
MCHC RBC AUTO-ENTMCNC: 35.8 G/DL (ref 31–36)
MCV RBC AUTO: 83.3 FL (ref 80–100)
MONOCYTES # BLD: 1.3 K/UL (ref 0.2–2.4)
MONOCYTES NFR BLD: 9 % (ref 1.7–9.3)
NEUTS SEG # BLD: 8.8 K/UL (ref 1.8–7.7)
NEUTS SEG NFR BLD: 62 % (ref 42–75)
NRBC # BLD: 0.29 K/UL
NRBC BLD-RTO: 2 PER 100 WBC
PLATELET # BLD AUTO: 375 K/UL (ref 150–400)
PMV BLD AUTO: 7.7 FL (ref 6.5–11.5)
POTASSIUM SERPL-SCNC: 4.3 MMOL/L (ref 3.5–5.1)
PROT SERPL-MCNC: 9.9 G/DL (ref 6.4–8.2)
RBC # BLD AUTO: 5.61 M/UL (ref 4.5–5.9)
SODIUM SERPL-SCNC: 141 MMOL/L (ref 136–145)
WBC # BLD AUTO: 14.2 K/UL (ref 4.4–11.3)

## 2021-08-17 PROCEDURE — 74011250636 HC RX REV CODE- 250/636: Performed by: EMERGENCY MEDICINE

## 2021-08-17 PROCEDURE — 96361 HYDRATE IV INFUSION ADD-ON: CPT

## 2021-08-17 PROCEDURE — 80053 COMPREHEN METABOLIC PANEL: CPT

## 2021-08-17 PROCEDURE — 99284 EMERGENCY DEPT VISIT MOD MDM: CPT

## 2021-08-17 PROCEDURE — 96374 THER/PROPH/DIAG INJ IV PUSH: CPT

## 2021-08-17 PROCEDURE — 83605 ASSAY OF LACTIC ACID: CPT

## 2021-08-17 PROCEDURE — 93005 ELECTROCARDIOGRAM TRACING: CPT

## 2021-08-17 PROCEDURE — 85025 COMPLETE CBC W/AUTO DIFF WBC: CPT

## 2021-08-17 PROCEDURE — 36415 COLL VENOUS BLD VENIPUNCTURE: CPT

## 2021-08-17 PROCEDURE — 83735 ASSAY OF MAGNESIUM: CPT

## 2021-08-17 RX ORDER — ONDANSETRON 2 MG/ML
4 INJECTION INTRAMUSCULAR; INTRAVENOUS
Status: COMPLETED | OUTPATIENT
Start: 2021-08-17 | End: 2021-08-17

## 2021-08-17 RX ADMIN — SODIUM CHLORIDE 1000 ML: 9 INJECTION, SOLUTION INTRAVENOUS at 19:42

## 2021-08-17 RX ADMIN — ONDANSETRON 4 MG: 2 INJECTION INTRAMUSCULAR; INTRAVENOUS at 20:01

## 2021-08-17 NOTE — ED TRIAGE NOTES
Within the past hour pt has began to feel nauseated, vomited x2, and having some SOB. Pt denies chest pain at this time but does state he has a headache and feels weak.

## 2021-08-17 NOTE — LETTER
NOTIFICATION RETURN TO WORK / SCHOOL 
 
8/17/2021 9:02 PM 
 
Mr. Fern Lopez [id number] To Whom It May Concern: 
 
Virgie Pedroza. is currently under the care of [facility] EMERGENCY DEPT. He will return to work/school on: 8/19/2021 Virgie Pedroza may return to work/school with the following restrictions: None If there are questions or concerns please have the patient contact our office. Sincerely, Saman Tuttle RN

## 2021-08-18 LAB
ATRIAL RATE: 107 BPM
CALCULATED P AXIS, ECG09: 36 DEGREES
CALCULATED R AXIS, ECG10: 14 DEGREES
CALCULATED T AXIS, ECG11: 56 DEGREES
DIAGNOSIS, 93000: NORMAL
P-R INTERVAL, ECG05: 162 MS
Q-T INTERVAL, ECG07: 309 MS
QRS DURATION, ECG06: 82 MS
QTC CALCULATION (BEZET), ECG08: 413 MS
VENTRICULAR RATE, ECG03: 107 BPM

## 2021-08-18 NOTE — ED NOTES
Patient given and verbalized understanding of all discharge, medication, and follow-up instructions. Patient departed ED ambulatory, with spouse, in good condition.

## 2021-08-19 DIAGNOSIS — Z88.9 H/O SEASONAL ALLERGIES: ICD-10-CM

## 2021-08-19 RX ORDER — FLUTICASONE PROPIONATE 50 MCG
SPRAY, SUSPENSION (ML) NASAL
Qty: 16 G | Refills: 0 | Status: SHIPPED | OUTPATIENT
Start: 2021-08-19 | End: 2021-10-07

## 2021-08-26 ENCOUNTER — OFFICE VISIT (OUTPATIENT)
Dept: PRIMARY CARE CLINIC | Age: 36
End: 2021-08-26
Payer: MEDICAID

## 2021-08-26 VITALS
TEMPERATURE: 98.1 F | RESPIRATION RATE: 20 BRPM | DIASTOLIC BLOOD PRESSURE: 69 MMHG | HEART RATE: 95 BPM | SYSTOLIC BLOOD PRESSURE: 130 MMHG | BODY MASS INDEX: 31.78 KG/M2 | WEIGHT: 209 LBS | OXYGEN SATURATION: 99 %

## 2021-08-26 DIAGNOSIS — F32.A ANXIETY AND DEPRESSION: ICD-10-CM

## 2021-08-26 DIAGNOSIS — K58.9 IRRITABLE BOWEL SYNDROME, UNSPECIFIED TYPE: Primary | ICD-10-CM

## 2021-08-26 DIAGNOSIS — I10 ESSENTIAL HYPERTENSION: ICD-10-CM

## 2021-08-26 DIAGNOSIS — K21.9 GASTROESOPHAGEAL REFLUX DISEASE WITHOUT ESOPHAGITIS: ICD-10-CM

## 2021-08-26 DIAGNOSIS — F10.11 H/O ALCOHOL ABUSE: ICD-10-CM

## 2021-08-26 DIAGNOSIS — T78.40XD ALLERGY, SUBSEQUENT ENCOUNTER: ICD-10-CM

## 2021-08-26 DIAGNOSIS — E66.09 CLASS 1 OBESITY DUE TO EXCESS CALORIES WITH SERIOUS COMORBIDITY AND BODY MASS INDEX (BMI) OF 32.0 TO 32.9 IN ADULT: ICD-10-CM

## 2021-08-26 DIAGNOSIS — F41.9 ANXIETY AND DEPRESSION: ICD-10-CM

## 2021-08-26 PROCEDURE — 99214 OFFICE O/P EST MOD 30 MIN: CPT | Performed by: NURSE PRACTITIONER

## 2021-08-26 RX ORDER — PANTOPRAZOLE SODIUM 40 MG/1
40 TABLET, DELAYED RELEASE ORAL DAILY
Qty: 90 TABLET | Refills: 1 | Status: SHIPPED | OUTPATIENT
Start: 2021-08-26 | End: 2022-02-24 | Stop reason: ALTCHOICE

## 2021-08-26 NOTE — PROGRESS NOTES
Patricia Juarez Sr. is a 29 y.o. male who presents to the office today for the following:    Chief Complaint   Patient presents with    Hypertension    Depression       Past Medical History:   Diagnosis Date    Acute gastritis without hemorrhage 4/21/2021    Alcohol abuse 4/21/2021    Anxiety and depression 4/21/2021    Class 1 obesity due to excess calories with serious comorbidity and body mass index (BMI) of 32.0 to 32.9 in adult 4/21/2021    Constipation 4/8/2021    Essential hypertension 4/21/2021    GERD (gastroesophageal reflux disease)     Impingement syndrome of left shoulder 4/21/2021    Irritable bowel syndrome 6/25/2018    Irritable bowel syndrome     Non-traumatic rhabdomyolysis 12/24/2020       Past Surgical History:   Procedure Laterality Date    COLONOSCOPY N/A 5/22/2018    [facility]vall [facility] ENDOSCOPY VISIT-OUTPATIENT  2012    HX OTHER SURGICAL      biopsy taken of left arm infection/        Family History   Problem Relation Age of Onset    Heart Disease Mother     Diabetes Mother     Hypertension Mother     Liver Disease Father     Cancer Father     Prostate Cancer Father         Social History     Tobacco Use    Smoking status: Former Smoker     Packs/day: 0.50     Years: 20.00     Pack years: 10.00    Smokeless tobacco: Never Used    Tobacco comment: 3 pks/week   Vaping Use    Vaping Use: Former   Substance Use Topics    Alcohol use: Not Currently     Comment: Patient reports he has quit drinking alcohol x 1 month.  Drug use: Not Currently     Types: Cocaine, Marijuana, Prescription     Comment: 3 months ago marijuana        HPI  Patient here today for follow up with PMH of etoh dependence, GERD, obesity,recurrent rhabdomyolysis, muscle cramps, IBS and anxiety/depression. States that he is taking medications as directed but recently increased reflux symptoms.  Had been out of his nexium so got some OTC and thinks this led to symptoms coming back. No current pain or symptoms however today. Was recently treated in ED after getting overheated while working outside but feeling much better now. Current Outpatient Medications on File Prior to Visit   Medication Sig    fexofenadine (ALLEGRA) 180 mg tablet Take 0.5 Tablets by mouth daily. TAKE 1/2 TABLET (90 mg.) BY MOUTH DAILY.  fluticasone propionate (FLONASE) 50 mcg/actuation nasal spray USE 2 SPRAY(S) IN EACH NOSTRIL ONCE DAILY AS NEEDED FOR RUNNY NOSE    buPROPion SR (WELLBUTRIN SR) 150 mg SR tablet Take 1 Tablet by mouth two (2) times a day for 90 days.  naltrexone (DEPADE) 50 mg tablet Take 1 Tablet by mouth daily for 90 days.  hyoscyamine SR (LEVBID) 0.375 mg SR tablet Take 1 Tablet by mouth every twelve (12) hours as needed for Cramping.  amLODIPine (NORVASC) 2.5 mg tablet Take 1 Tab by mouth daily.  hydrOXYzine HCL (ATARAX) 25 mg tablet Take 25 mg by mouth every six (6) hours as needed.  montelukast (SINGULAIR) 10 mg tablet Take 1 Tab by mouth daily. No current facility-administered medications on file prior to visit. Medications Ordered Today   Medications    pantoprazole (PROTONIX) 40 mg tablet     Sig: Take 1 Tablet by mouth daily. Dispense:  90 Tablet     Refill:  1        Review of Systems   Constitutional: Negative. HENT: Negative. Eyes: Negative. Respiratory: Negative. Cardiovascular: Negative. Gastrointestinal: Positive for heartburn. Negative for abdominal pain, blood in stool, constipation, diarrhea, melena, nausea and vomiting. Genitourinary: Negative. Musculoskeletal: Negative. Skin: Negative. Neurological: Negative. Psychiatric/Behavioral: Negative.            Visit Vitals  /69 (BP 1 Location: Left upper arm, BP Patient Position: Sitting, BP Cuff Size: Adult)   Pulse 95   Temp 98.1 °F (36.7 °C) (Temporal)   Resp 20   Wt 209 lb (94.8 kg)   SpO2 99%   BMI 31.78 kg/m²       Physical Exam  Vitals and nursing note reviewed. Constitutional:       Appearance: Normal appearance. He is obese. Cardiovascular:      Rate and Rhythm: Normal rate. Pulses: Normal pulses. Heart sounds: Normal heart sounds. Pulmonary:      Effort: Pulmonary effort is normal.      Breath sounds: Normal breath sounds. Abdominal:      General: Bowel sounds are normal.      Palpations: Abdomen is soft. Tenderness: There is no abdominal tenderness. Musculoskeletal:         General: Normal range of motion. Right lower leg: No edema. Left lower leg: No edema. Skin:     General: Skin is warm and dry. Neurological:      Mental Status: He is alert. Mental status is at baseline. Psychiatric:         Mood and Affect: Mood normal.         Behavior: Behavior normal.            1. Irritable bowel syndrome, unspecified type  Stable    2. Gastroesophageal reflux disease without esophagitis  Recent increased symptoms but did have some time where not taking daily and using OTC nexium  Will start on protonix 40mg daily  Avoid all NSAIDS  Avoid foods that worsen symptoms, elevate HOB prn, eat smaller, more freuquent meals, avoid laying flat 1 hour after meal  Re-evaluate in 4 weeks if symptoms no improved or sooner if worsening  - pantoprazole (PROTONIX) 40 mg tablet; Take 1 Tablet by mouth daily. Dispense: 90 Tablet; Refill: 1    3. Essential hypertension  Stable and continue medication as directed  Monitor at home and notify provider if > 140/90    4. Class 1 obesity due to excess calories with serious comorbidity and body mass index (BMI) of 32.0 to 32.9 in adult  Continue to encourage weight loss. Recommend decreasing excess fat, salt and sugar in diet along with getting regular exercise 3-5 times weekly for 30-45 minutes consistently. 5. Anxiety and depression  Stable and continue medication as directed  Follows with psychiatry    6.  H/O alcohol abuse  Stable and reports no use of etoh  Continue medication as directed  Follows with psychiatry     7. Allergy, subsequent encounter  Stable      Patient verbalizes understanding of plan of care as discussed above    Follow-up and Dispositions    · Return in about 4 weeks (around 9/23/2021) for or sooner for worsening symptoms.

## 2021-10-07 DIAGNOSIS — Z88.9 H/O SEASONAL ALLERGIES: ICD-10-CM

## 2021-10-07 RX ORDER — FLUTICASONE PROPIONATE 50 MCG
SPRAY, SUSPENSION (ML) NASAL
Qty: 16 G | Refills: 0 | Status: SHIPPED | OUTPATIENT
Start: 2021-10-07 | End: 2021-12-14 | Stop reason: SDUPTHER

## 2021-11-04 ENCOUNTER — TELEPHONE (OUTPATIENT)
Dept: BEHAVIORAL/MENTAL HEALTH CLINIC | Age: 36
End: 2021-11-04

## 2021-11-04 DIAGNOSIS — F33.9 RECURRENT MAJOR DEPRESSIVE DISORDER, REMISSION STATUS UNSPECIFIED (HCC): Primary | ICD-10-CM

## 2021-11-04 RX ORDER — BUPROPION HYDROCHLORIDE 150 MG/1
150 TABLET, EXTENDED RELEASE ORAL 2 TIMES DAILY
Qty: 180 TABLET | Refills: 0 | Status: SHIPPED | OUTPATIENT
Start: 2021-11-04 | End: 2022-02-02

## 2021-11-04 RX ORDER — HYOSCYAMINE SULFATE 0.38 MG/1
375 TABLET, EXTENDED RELEASE ORAL
Qty: 60 TABLET | Refills: 2 | Status: SHIPPED | OUTPATIENT
Start: 2021-11-04 | End: 2022-03-28 | Stop reason: SDUPTHER

## 2021-11-08 RX ORDER — MONTELUKAST SODIUM 10 MG/1
10 TABLET ORAL DAILY
Qty: 90 TABLET | Refills: 0 | Status: SHIPPED | OUTPATIENT
Start: 2021-11-08 | End: 2022-02-17

## 2021-11-15 ENCOUNTER — OFFICE VISIT (OUTPATIENT)
Dept: PRIMARY CARE CLINIC | Age: 36
End: 2021-11-15
Payer: MEDICAID

## 2021-11-15 VITALS
HEART RATE: 73 BPM | WEIGHT: 200 LBS | DIASTOLIC BLOOD PRESSURE: 85 MMHG | RESPIRATION RATE: 18 BRPM | SYSTOLIC BLOOD PRESSURE: 130 MMHG | OXYGEN SATURATION: 99 % | TEMPERATURE: 98.1 F | BODY MASS INDEX: 30.41 KG/M2

## 2021-11-15 DIAGNOSIS — M54.2 NECK PAIN: ICD-10-CM
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
## 2021-11-15 DIAGNOSIS — F07.81 CONCUSSION SYNDROME: ICD-10-CM

## 2021-11-15 DIAGNOSIS — N28.1 RENAL CYST: Primary | ICD-10-CM

## 2021-11-15 DIAGNOSIS — G89.29 CHRONIC LEFT SHOULDER PAIN: ICD-10-CM

## 2021-11-15 DIAGNOSIS — M25.512 CHRONIC LEFT SHOULDER PAIN: ICD-10-CM

## 2021-11-15 PROBLEM — M75.42 IMPINGEMENT SYNDROME OF LEFT SHOULDER: Status: RESOLVED | Noted: 2021-04-21 | Resolved: 2021-11-15

## 2021-11-15 PROCEDURE — 99214 OFFICE O/P EST MOD 30 MIN: CPT | Performed by: NURSE PRACTITIONER
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
## 2021-11-22 ENCOUNTER — TELEPHONE (OUTPATIENT)
Dept: PRIMARY CARE CLINIC | Age: 36
End: 2021-11-22
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
## 2021-11-30 DIAGNOSIS — F10.10 ALCOHOL ABUSE: Primary | ICD-10-CM

## 2021-11-30 RX ORDER — NALTREXONE HYDROCHLORIDE 50 MG/1
50 TABLET, FILM COATED ORAL DAILY
Qty: 90 TABLET | Refills: 0 | Status: SHIPPED | OUTPATIENT
Start: 2021-11-30 | End: 2022-02-28

## 2021-12-14 DIAGNOSIS — Z88.9 H/O SEASONAL ALLERGIES: ICD-10-CM

## 2021-12-14 RX ORDER — FLUTICASONE PROPIONATE 50 MCG
2 SPRAY, SUSPENSION (ML) NASAL DAILY
Qty: 16 G | Refills: 0 | Status: SHIPPED | OUTPATIENT
Start: 2021-12-14 | End: 2022-02-24 | Stop reason: SDUPTHER

## 2022-01-06 DIAGNOSIS — Z88.9 H/O SEASONAL ALLERGIES: Primary | ICD-10-CM

## 2022-01-06 RX ORDER — HYDROXYZINE 25 MG/1
25 TABLET, FILM COATED ORAL
Qty: 120 TABLET | Refills: 0 | Status: SHIPPED | OUTPATIENT
Start: 2022-01-06 | End: 2022-05-08 | Stop reason: SDUPTHER

## 2022-01-14 ENCOUNTER — TELEPHONE (OUTPATIENT)
Dept: PRIMARY CARE CLINIC | Age: 37
End: 2022-01-14

## 2022-01-14 ENCOUNTER — HOSPITAL ENCOUNTER (EMERGENCY)
Age: 37
Discharge: HOME | End: 2022-01-14
Attending: EMERGENCY MEDICINE
Payer: COMMERCIAL

## 2022-01-14 VITALS
OXYGEN SATURATION: 97 % | BODY MASS INDEX: 31.08 KG/M2 | RESPIRATION RATE: 20 BRPM | SYSTOLIC BLOOD PRESSURE: 146 MMHG | WEIGHT: 198 LBS | HEIGHT: 67 IN | TEMPERATURE: 98.2 F | DIASTOLIC BLOOD PRESSURE: 95 MMHG | HEART RATE: 97 BPM

## 2022-01-14 DIAGNOSIS — M62.82 NON-TRAUMATIC RHABDOMYOLYSIS: Primary | ICD-10-CM

## 2022-01-14 LAB
ALBUMIN SERPL-MCNC: 4.1 G/DL (ref 3.5–5)
ALBUMIN/GLOB SERPL: 1.2 {RATIO} (ref 1.1–2.2)
ALP SERPL-CCNC: 53 U/L (ref 45–117)
ALT SERPL-CCNC: 33 U/L (ref 12–78)
ANION GAP SERPL CALC-SCNC: 11 MMOL/L (ref 5–15)
AST SERPL W P-5'-P-CCNC: 26 U/L (ref 15–37)
BASOPHILS # BLD: 0.1 K/UL (ref 0–0.2)
BASOPHILS NFR BLD: 1 % (ref 0–2.5)
BILIRUB SERPL-MCNC: 0.5 MG/DL (ref 0.2–1)
BUN SERPL-MCNC: 12 MG/DL (ref 6–20)
BUN/CREAT SERPL: 12 (ref 12–20)
CA-I BLD-MCNC: 9.2 MG/DL (ref 8.5–10.1)
CHLORIDE SERPL-SCNC: 102 MMOL/L (ref 97–108)
CK SERPL-CCNC: 651.41 U/L (ref 39–308)
CO2 SERPL-SCNC: 29 MMOL/L (ref 21–32)
CREAT SERPL-MCNC: 1.03 MG/DL (ref 0.7–1.3)
EOSINOPHIL # BLD: 0.2 K/UL (ref 0–0.7)
EOSINOPHIL NFR BLD: 2 % (ref 0.9–2.9)
ERYTHROCYTE [DISTWIDTH] IN BLOOD BY AUTOMATED COUNT: 13.9 % (ref 11.5–14.5)
GLOBULIN SER CALC-MCNC: 3.4 G/DL (ref 2–4)
GLUCOSE SERPL-MCNC: 92 MG/DL (ref 65–100)
HCT VFR BLD AUTO: 41.1 % (ref 41–53)
HGB BLD-MCNC: 14.6 G/DL (ref 13.5–17.5)
LYMPHOCYTES # BLD: 3.2 K/UL (ref 1–4.8)
LYMPHOCYTES NFR BLD: 37 % (ref 20.5–51.1)
MCH RBC QN AUTO: 31 PG (ref 31–34)
MCHC RBC AUTO-ENTMCNC: 35.5 G/DL (ref 31–36)
MCV RBC AUTO: 87.3 FL (ref 80–100)
MONOCYTES # BLD: 0.7 K/UL (ref 0.2–2.4)
MONOCYTES NFR BLD: 8 % (ref 1.7–9.3)
MYOGLOBIN SERPL-MCNC: 103 NG/ML (ref 16–116)
NEUTS SEG # BLD: 4.7 K/UL (ref 1.8–7.7)
NEUTS SEG NFR BLD: 52 % (ref 42–75)
NRBC # BLD: 0.02 K/UL
NRBC BLD-RTO: 0.2 PER 100 WBC
PLATELET # BLD AUTO: 360 K/UL (ref 150–400)
PMV BLD AUTO: 7.5 FL (ref 6.5–11.5)
POTASSIUM SERPL-SCNC: 3.7 MMOL/L (ref 3.5–5.1)
PROT SERPL-MCNC: 7.5 G/DL (ref 6.4–8.2)
RBC # BLD AUTO: 4.71 M/UL (ref 4.5–5.9)
SODIUM SERPL-SCNC: 142 MMOL/L (ref 136–145)
WBC # BLD AUTO: 8.8 K/UL (ref 4.4–11.3)

## 2022-01-14 PROCEDURE — 85025 COMPLETE CBC W/AUTO DIFF WBC: CPT

## 2022-01-14 PROCEDURE — 99282 EMERGENCY DEPT VISIT SF MDM: CPT

## 2022-01-14 PROCEDURE — 83874 ASSAY OF MYOGLOBIN: CPT

## 2022-01-14 PROCEDURE — 36415 COLL VENOUS BLD VENIPUNCTURE: CPT

## 2022-01-14 PROCEDURE — 82550 ASSAY OF CK (CPK): CPT

## 2022-01-14 PROCEDURE — 80053 COMPREHEN METABOLIC PANEL: CPT

## 2022-01-14 PROCEDURE — 96374 THER/PROPH/DIAG INJ IV PUSH: CPT

## 2022-01-14 PROCEDURE — 74011250636 HC RX REV CODE- 250/636: Performed by: EMERGENCY MEDICINE

## 2022-01-14 RX ORDER — ONDANSETRON 2 MG/ML
4 INJECTION INTRAMUSCULAR; INTRAVENOUS
Status: COMPLETED | OUTPATIENT
Start: 2022-01-14 | End: 2022-01-14

## 2022-01-14 RX ADMIN — SODIUM CHLORIDE 1000 ML: 9 INJECTION, SOLUTION INTRAVENOUS at 14:39

## 2022-01-14 RX ADMIN — ONDANSETRON 4 MG: 2 INJECTION INTRAMUSCULAR; INTRAVENOUS at 14:38

## 2022-01-14 NOTE — Clinical Note
[address]  [facility] EMERGENCY DEPT  [phone]  [phone]    Work/School Note    Date: 1/14/2022    To Whom It May concern:    Tatyana Aguilar Sr. was seen and treated today in the emergency room by the following provider(s):  Attending Provider: Brijesh Washington MD.      Bouchra Roldan is excused from work/school on 1/14/2022 through 1/16/2022. He is medically clear to return to work/school on 1/17/2022.          Sincerely,          Asad Mathur MD

## 2022-01-14 NOTE — ED TRIAGE NOTES
.  Chief Complaint   Patient presents with    Generalized Body Aches     pt presents with c/o nausea and body aches that started this morning, pt afebrile and 97% on room air. Pt in NAD A&Ox4.  Pt works at [employer] Nausea

## 2022-01-14 NOTE — Clinical Note
[address] [facility] EMERGENCY DEPT  [phone]  [phone]    Work/School Note    Date: 1/14/2022    To Whom It May concern:    Edelmira Severance  was seen and treated today in the emergency room by the following provider(s):  Attending Provider: Nanette Seymour MD.      Tharon Harada. is excused from work/school on 1/14/2022 through 1/16/2022. He is medically clear to return to work/school on 1/17/2022.          Sincerely,          Edmund Leung RN

## 2022-01-14 NOTE — ED PROVIDER NOTES
EMERGENCY DEPARTMENT HISTORY AND PHYSICAL EXAM      Date: 1/14/2022  Patient Name: Nelson Morrow. History of Presenting Illness     Chief Complaint   Patient presents with    Generalized Body Aches     pt presents with c/o nausea and body aches that started this morning, pt afebrile and 97% on room air. Pt in NAD A&Ox4. Pt works at [employer] Nausea       History Provided By: Patient    HPI: Nelson Morrow., 29 y.o. male with a past medical history significant No significant past medical history presents to the ED with cc of muscle cramps and body aches, with nausea while at work. Patient state he this before and was told it was his muscle breaking down. No recent trauma or dizziness. There are no other complaints, changes, or physical findings at this time. PCP: Rock Saurabh NP    No current facility-administered medications on file prior to encounter. Current Outpatient Medications on File Prior to Encounter   Medication Sig Dispense Refill    hydrOXYzine HCL (ATARAX) 25 mg tablet Take 1 Tablet by mouth every six (6) hours as needed for Itching. 120 Tablet 0    fluticasone propionate (FLONASE) 50 mcg/actuation nasal spray 2 Sprays by Both Nostrils route daily. 16 g 0    montelukast (SINGULAIR) 10 mg tablet Take 1 Tablet by mouth daily. 90 Tablet 0    fexofenadine (ALLEGRA) 180 mg tablet Take 0.5 Tablets by mouth daily. TAKE 1/2 TABLET (90 mg.) BY MOUTH DAILY. 90 Tablet 0    naltrexone (DEPADE) 50 mg tablet Take 1 Tablet by mouth daily for 90 days. 90 Tablet 0    hyoscyamine SR (LEVBID) 0.375 mg SR tablet Take 1 Tablet by mouth every twelve (12) hours as needed for Cramping. 60 Tablet 2    buPROPion SR (WELLBUTRIN SR) 150 mg SR tablet Take 1 Tablet by mouth two (2) times a day for 90 days. 180 Tablet 0    pantoprazole (PROTONIX) 40 mg tablet Take 1 Tablet by mouth daily. 90 Tablet 1    amLODIPine (NORVASC) 2.5 mg tablet Take 1 Tab by mouth daily.  90 Tab 0       Past History     Past Medical History:  Past Medical History:   Diagnosis Date    Acute gastritis without hemorrhage 4/21/2021    Alcohol abuse 4/21/2021    Anxiety and depression 4/21/2021    Class 1 obesity due to excess calories with serious comorbidity and body mass index (BMI) of 32.0 to 32.9 in adult 4/21/2021    Constipation 4/8/2021    Essential hypertension 4/21/2021    GERD (gastroesophageal reflux disease)     Impingement syndrome of left shoulder 4/21/2021    Irritable bowel syndrome 6/25/2018    Irritable bowel syndrome     Non-traumatic rhabdomyolysis 12/24/2020       Past Surgical History:  Past Surgical History:   Procedure Laterality Date    COLONOSCOPY N/A 5/22/2018    COLONOSCOPY performed by Raji Correa MD at [facility] ENDOSCOPY VISIT-OUTPATIENT  2012    HX OTHER SURGICAL      biopsy taken of left arm infection/       Family History:  Family History   Problem Relation Age of Onset    Heart Disease Mother     Diabetes Mother     Hypertension Mother     Liver Disease Father     Cancer Father     Prostate Cancer Father        Social History:  Social History     Tobacco Use    Smoking status: Former Smoker     Packs/day: 0.50     Years: 20.00     Pack years: 10.00    Smokeless tobacco: Never Used    Tobacco comment: 3 pks/week   Vaping Use    Vaping Use: Former   Substance Use Topics    Alcohol use: Not Currently     Comment: Patient reports he has quit drinking alcohol x 1 month.  Drug use: Not Currently     Types: Cocaine, Marijuana, Prescription     Comment: 3 months ago marijuana       Allergies: Allergies   Allergen Reactions    Cashew Nut Hives    Egg Derived Hives    Milk Other (comments)     GI issues    Onion Hives    Peanut Other (comments)     GI issues    Tomato Hives    Nsaids (Non-Steroidal Anti-Inflammatory Drug) Other (comments)     H/o ulcers         Review of Systems     Review of Systems   Constitutional: Negative. HENT: Negative. Eyes: Negative. Respiratory: Negative. Cardiovascular: Negative. Gastrointestinal: Negative. Endocrine: Negative. Genitourinary: Negative. Musculoskeletal: Negative. Cramps   Skin: Negative. Allergic/Immunologic: Negative. Neurological: Negative. Hematological: Negative. Psychiatric/Behavioral: Negative. All other systems reviewed and are negative. Physical Exam     Physical Exam  Vitals and nursing note reviewed. Constitutional:       Appearance: Normal appearance. HENT:      Head: Normocephalic. Nose: Nose normal.      Mouth/Throat:      Mouth: Mucous membranes are moist.   Eyes:      Pupils: Pupils are equal, round, and reactive to light. Cardiovascular:      Rate and Rhythm: Normal rate. Pulmonary:      Effort: Pulmonary effort is normal.   Abdominal:      General: Abdomen is flat. Musculoskeletal:         General: No tenderness. Normal range of motion. Cervical back: Normal range of motion. Skin:     General: Skin is warm. Neurological:      General: No focal deficit present. Mental Status: He is alert and oriented to person, place, and time. Psychiatric:         Mood and Affect: Mood normal.         Lab and Diagnostic Study Results     Labs -   No results found for this or any previous visit (from the past 12 hour(s)). Recent Results (from the past 12 hour(s))   CBC WITH AUTOMATED DIFF    Collection Time: 01/14/22  2:30 PM   Result Value Ref Range    WBC 8.8 4.4 - 11.3 K/uL    RBC 4.71 4.50 - 5.90 M/uL    HGB 14.6 13.5 - 17.5 g/dL    HCT 41.1 41 - 53 %    MCV 87.3 80 - 100 FL    MCH 31.0 31 - 34 PG    MCHC 35.5 31.0 - 36.0 g/dL    RDW 13.9 11.5 - 14.5 %    PLATELET 289 472 - 787 K/uL    MPV 7.5 6.5 - 11.5 FL    NRBC 0.2  WBC    ABSOLUTE NRBC 0.02 K/uL    NEUTROPHILS 52 42 - 75 %    LYMPHOCYTES 37 20.5 - 51.1 %    MONOCYTES 8 1.7 - 9.3 %    EOSINOPHILS 2 0.9 - 2.9 %    BASOPHILS 1 0.0 - 2.5 %    ABS. NEUTROPHILS 4.7 1.8 - 7.7 K/UL    ABS. LYMPHOCYTES 3.2 1.0 - 4.8 K/UL    ABS. MONOCYTES 0.7 0.2 - 2.4 K/UL    ABS. EOSINOPHILS 0.2 0.0 - 0.7 K/UL    ABS. BASOPHILS 0.1 0.0 - 0.2 K/UL   METABOLIC PANEL, COMPREHENSIVE    Collection Time: 01/14/22  2:30 PM   Result Value Ref Range    Sodium 142 136 - 145 mmol/L    Potassium 3.7 3.5 - 5.1 mmol/L    Chloride 102 97 - 108 mmol/L    CO2 29 21 - 32 mmol/L    Anion gap 11 5 - 15 mmol/L    Glucose 92 65 - 100 mg/dL    BUN 12 6 - 20 mg/dL    Creatinine 1.03 0.70 - 1.30 mg/dL    BUN/Creatinine ratio 12 12 - 20      GFR est AA >60 >60 ml/min/1.73m2    GFR est non-AA >60 >60 ml/min/1.73m2    Calcium 9.2 8.5 - 10.1 mg/dL    Bilirubin, total 0.5 0.2 - 1.0 mg/dL    AST (SGOT) 26 15 - 37 U/L    ALT (SGPT) 33 12 - 78 U/L    Alk. phosphatase 53 45 - 117 U/L    Protein, total 7.5 6.4 - 8.2 g/dL    Albumin 4.1 3.5 - 5.0 g/dL    Globulin 3.4 2.0 - 4.0 g/dL    A-G Ratio 1.2 1.1 - 2.2     MYOGLOBIN    Collection Time: 01/14/22  2:30 PM   Result Value Ref Range    Myoglobin 103 16 - 116 ng/mL   CK    Collection Time: 01/14/22  2:30 PM   Result Value Ref Range    .41 (H) 39 - 308 U/L     Radiologic Studies -   @[email or web address]@  CT Results  (Last 48 hours)    None        CXR Results  (Last 48 hours)    None            Medical Decision Making   - I am the first provider for this patient. - I reviewed the vital signs, available nursing notes, past medical history, past surgical history, family history and social history. - Initial assessment performed. The patients presenting problems have been discussed, and they are in agreement with the care plan formulated and outlined with them. I have encouraged them to ask questions as they arise throughout their visit. Vital Signs-Reviewed the patient's vital signs.   Patient Vitals for the past 12 hrs:   Temp Pulse Resp BP SpO2   01/14/22 1408 98.2 °F (36.8 °C) 97 20 (!) 146/95 97 %       Records Reviewed: Nursing Notes    The patient presents with spasm/cramps with a differential diagnosis of rhabdomyolysis, cramps, electrolysisimbalance      ED Course:          Provider Notes (Medical Decision Making): STEPHANE       Procedures   Medical Decision Makingedical Decision Making  Performed by: Nguyen Enriquez MD  PROCEDURES  Procedures       Disposition   Disposition: Condition improved  DC- Adult Discharges: All of the diagnostic tests were reviewed and questions answered. Diagnosis, care plan and treatment options were discussed. The patient understands the instructions and will follow up as directed. The patients results have been reviewed with them. They have been counseled regarding their diagnosis. The patient verbally convey understanding and agreement of the signs, symptoms, diagnosis, treatment and prognosis and additionally agrees to follow up as recommended with their PCP in 24 - 48 hours. They also agree with the care-plan and convey that all of their questions have been answered. I have also put together some discharge instructions for them that include: 1) educational information regarding their diagnosis, 2) how to care for their diagnosis at home, as well a 3) list of reasons why they would want to return to the ED prior to their follow-up appointment, should their condition change. DISCHARGE PLAN:  1. Current Discharge Medication List      CONTINUE these medications which have NOT CHANGED    Details   hydrOXYzine HCL (ATARAX) 25 mg tablet Take 1 Tablet by mouth every six (6) hours as needed for Itching. Qty: 120 Tablet, Refills: 0    Associated Diagnoses: H/O seasonal allergies      fluticasone propionate (FLONASE) 50 mcg/actuation nasal spray 2 Sprays by Both Nostrils route daily. Qty: 16 g, Refills: 0    Associated Diagnoses: H/O seasonal allergies      montelukast (SINGULAIR) 10 mg tablet Take 1 Tablet by mouth daily. Qty: 90 Tablet, Refills: 0      fexofenadine (ALLEGRA) 180 mg tablet Take 0.5 Tablets by mouth daily.  TAKE 1/2 TABLET (90 mg.) BY MOUTH DAILY. Qty: 90 Tablet, Refills: 0    Associated Diagnoses: H/O seasonal allergies      naltrexone (DEPADE) 50 mg tablet Take 1 Tablet by mouth daily for 90 days. Qty: 90 Tablet, Refills: 0    Associated Diagnoses: Alcohol abuse      hyoscyamine SR (LEVBID) 0.375 mg SR tablet Take 1 Tablet by mouth every twelve (12) hours as needed for Cramping. Qty: 60 Tablet, Refills: 2      buPROPion SR (WELLBUTRIN SR) 150 mg SR tablet Take 1 Tablet by mouth two (2) times a day for 90 days. Qty: 180 Tablet, Refills: 0    Associated Diagnoses: Recurrent major depressive disorder, remission status unspecified (HCC)      pantoprazole (PROTONIX) 40 mg tablet Take 1 Tablet by mouth daily. Qty: 90 Tablet, Refills: 1    Associated Diagnoses: Gastroesophageal reflux disease without esophagitis      amLODIPine (NORVASC) 2.5 mg tablet Take 1 Tab by mouth daily. Qty: 90 Tab, Refills: 0    Associated Diagnoses: Essential hypertension           2. Follow-up Information    None       3. Return to ED if worse   4. Current Discharge Medication List            Diagnosis     Clinical Impression:    ICD-10-CM ICD-9-CM    1. Non-traumatic rhabdomyolysis  M62.82 728.88      Attestations:    Dex Hanley MD    Please note that this dictation was completed with [employer], the [employer] voice recognition software. Quite often unanticipated grammatical, syntax, homophones, and other interpretive errors are inadvertently transcribed by the computer software. Please disregard these errors. Please excuse any errors that have escaped final proofreading. Thank you.

## 2022-01-14 NOTE — Clinical Note
[address]  [facility] EMERGENCY DEPT  [phone]  [phone]    Work/School Note    Date: 1/14/2022    To Whom It May concern:    Elaina Head . was seen and treated today in the emergency room by the following provider(s):  Attending Provider: Mana Tillman MD.      Leonora Miranda. is excused from work/school on 1/14/2022 through 1/16/2022. He is medically clear to return to work/school on 1/17/2022.          Sincerely,          Jose Ching MD

## 2022-01-14 NOTE — Clinical Note
[address]  [facility] EMERGENCY DEPT  [id number] [phone]    Work/School Note    Date: 1/14/2022    To Whom It May concern:    Marie Xochilt Winkler was seen and treated today in the emergency room by the following provider(s):  Attending Provider: Rizwana Lutz MD.      Keisha Feldman. is excused from work/school on 1/14/2022 through 1/16/2022. He is medically clear to return to work/school on 1/17/2022.          Sincerely,          Ce Varela MD

## 2022-01-17 ENCOUNTER — OFFICE VISIT (OUTPATIENT)
Dept: PRIMARY CARE CLINIC | Age: 37
End: 2022-01-17
Payer: MEDICAID

## 2022-01-17 VITALS
TEMPERATURE: 97.5 F | WEIGHT: 201 LBS | DIASTOLIC BLOOD PRESSURE: 86 MMHG | HEIGHT: 67 IN | SYSTOLIC BLOOD PRESSURE: 149 MMHG | HEART RATE: 81 BPM | BODY MASS INDEX: 31.55 KG/M2 | OXYGEN SATURATION: 99 % | RESPIRATION RATE: 20 BRPM

## 2022-01-17 DIAGNOSIS — M62.82 NON-TRAUMATIC RHABDOMYOLYSIS: Primary | ICD-10-CM

## 2022-01-17 DIAGNOSIS — I10 ESSENTIAL HYPERTENSION: ICD-10-CM

## 2022-01-17 PROCEDURE — 99214 OFFICE O/P EST MOD 30 MIN: CPT | Performed by: NURSE PRACTITIONER

## 2022-01-17 RX ORDER — AMLODIPINE BESYLATE 2.5 MG/1
2.5 TABLET ORAL DAILY
Qty: 90 TABLET | Refills: 0 | Status: SHIPPED | OUTPATIENT
Start: 2022-01-17 | End: 2022-05-08 | Stop reason: SDUPTHER

## 2022-01-17 NOTE — LETTER
NOTIFICATION RETURN TO WORK / SCHOOL    1/17/2022 11:25 AM    Mr. Sedrick Baires [id number]      To Whom It May Concern:    Marcela Stroud is currently under the care of [facility]. He would like to let you know that he suffers with recurrent rhabdomyolysis. Would like to request that he work in position that allows for a tapered/self determined pace. He is under care of specialist to further evaluate his condition. If there are questions or concerns please have the patient contact our office.         Sincerely,      Humberto Menjivar NP

## 2022-01-17 NOTE — PROGRESS NOTES
Chief Complaint   Patient presents with    ED Follow-up    Abdominal Pain    Nausea     Pt is not taking his BP meds

## 2022-01-18 NOTE — PROGRESS NOTES
Amrik Montiel Sr. is a 29 y.o. male who presents to the office today for the following:    Chief Complaint   Patient presents with   [facility] ED Follow-up    Muscle Pain    Nausea       Past Medical History:   Diagnosis Date    Acute gastritis without hemorrhage 4/21/2021    Alcohol abuse 4/21/2021    Anxiety and depression 4/21/2021    Class 1 obesity due to excess calories with serious comorbidity and body mass index (BMI) of 32.0 to 32.9 in adult 4/21/2021    Constipation 4/8/2021    Essential hypertension 4/21/2021    GERD (gastroesophageal reflux disease)     Impingement syndrome of left shoulder 4/21/2021    Irritable bowel syndrome 6/25/2018    Irritable bowel syndrome     Non-traumatic rhabdomyolysis 12/24/2020       Past Surgical History:   Procedure Laterality Date    COLONOSCOPY N/A 5/22/2018    COLONOSCOPY performed by Adwoa Morales MD at [facility] ENDOSCOPY VISIT-OUTPATIENT  2012    HX OTHER SURGICAL      biopsy taken of left arm infection/        Family History   Problem Relation Age of Onset    Heart Disease Mother     Diabetes Mother     Hypertension Mother     Liver Disease Father     Cancer Father     Prostate Cancer Father         Social History     Tobacco Use    Smoking status: Former Smoker     Packs/day: 0.50     Years: 20.00     Pack years: 10.00    Smokeless tobacco: Never Used    Tobacco comment: 3 pks/week   Vaping Use    Vaping Use: Former   Substance Use Topics    Alcohol use: Not Currently     Comment: Patient reports he has quit drinking alcohol x 1 month.  Drug use: Not Currently     Types: Cocaine, Marijuana, Prescription     Comment: 3 months ago marijuana        HPI  Patient here today for ED follow up on 1/14/22 due to non-traumatic rhabdomyolysis. States that he was moved into new position at work and this is much more physical. Started having nausea and muscle pains all over prior to going to ED.  Was given IVF which helped symptoms and today does feel better. Has had this multiple times in the past. Denies any recent use of alcohol. Has been trying to drink lots of water and using the muscle relaxant prn. Did see Dr. Brigid Romano previously and was recommended to see a specialist at [facility]. He did not attend the last follow up appointment at [facility] but plans to schedule. Wants to get note to try to return to prior work area as this was less physical and feels was manageable. Current Outpatient Medications on File Prior to Visit   Medication Sig    hydrOXYzine HCL (ATARAX) 25 mg tablet Take 1 Tablet by mouth every six (6) hours as needed for Itching.  fluticasone propionate (FLONASE) 50 mcg/actuation nasal spray 2 Sprays by Both Nostrils route daily.  naltrexone (DEPADE) 50 mg tablet Take 1 Tablet by mouth daily for 90 days.  montelukast (SINGULAIR) 10 mg tablet Take 1 Tablet by mouth daily.  hyoscyamine SR (LEVBID) 0.375 mg SR tablet Take 1 Tablet by mouth every twelve (12) hours as needed for Cramping.  buPROPion SR (WELLBUTRIN SR) 150 mg SR tablet Take 1 Tablet by mouth two (2) times a day for 90 days.  pantoprazole (PROTONIX) 40 mg tablet Take 1 Tablet by mouth daily.  fexofenadine (ALLEGRA) 180 mg tablet Take 0.5 Tablets by mouth daily. TAKE 1/2 TABLET (90 mg.) BY MOUTH DAILY.  [DISCONTINUED] amLODIPine (NORVASC) 2.5 mg tablet Take 1 Tab by mouth daily. (Patient not taking: Reported on 1/17/2022)     No current facility-administered medications on file prior to visit. Medications Ordered Today   Medications    amLODIPine (NORVASC) 2.5 mg tablet     Sig: Take 1 Tablet by mouth daily. Dispense:  90 Tablet     Refill:  0        Review of Systems   Constitutional: Negative. Respiratory: Negative. Cardiovascular: Negative. Gastrointestinal: Negative. Genitourinary: Negative. Musculoskeletal: Positive for myalgias. Neurological: Negative.            Visit Vitals  BP (!) 149/86 (BP 1 Location: Left upper arm, BP Patient Position: Sitting, BP Cuff Size: Adult)   Pulse 81   Temp 97.5 °F (36.4 °C) (Temporal)   Resp 20   Ht 5' 7\" (1.702 m)   Wt 201 lb (91.2 kg)   SpO2 99%   BMI 31.48 kg/m²       Physical Exam  Vitals and nursing note reviewed. Constitutional:       Appearance: Normal appearance. He is obese. Cardiovascular:      Rate and Rhythm: Normal rate and regular rhythm. Pulses: Normal pulses. Heart sounds: Normal heart sounds. Pulmonary:      Effort: Pulmonary effort is normal.      Breath sounds: Normal breath sounds. Abdominal:      General: Bowel sounds are normal.      Palpations: Abdomen is soft. Tenderness: There is no abdominal tenderness. Musculoskeletal:      Right lower leg: No edema. Left lower leg: No edema. Skin:     General: Skin is warm and dry. Neurological:      Mental Status: He is alert and oriented to person, place, and time. Mental status is at baseline. Gait: Gait normal.   Psychiatric:         Mood and Affect: Mood normal.         Behavior: Behavior normal.            1. Non-traumatic rhabdomyolysis  Lab Results   Component Value Date/Time    .41 (H) 01/14/2022 02:30 PM     Lab Results   Component Value Date/Time    Creatinine 1.03 01/14/2022 02:30 PM     Symptoms improved after fluids in ED and was milder   He was referred to a neurologist at [facility] after eval done with Randy Fothergill and did miss the last follow up. Encouraged him to schedule given this is problematic with doing any exertional activities. Provided note per patient request to discuss recurrent problem and request for area of work that allows for more self-directed pace. 2. Essential hypertension  Blood pressure is elevated today but reports he has not been taking medication  Agrees to resume and encouraged him to monitor at home.   Call in 2 weeks with home readings      Patient verbalizes understanding of plan of care as discussed above    Follow-up and Dispositions    · Return in about 4 weeks (around 2/14/2022), or if symptoms worsen or fail to improve.

## 2022-02-17 DIAGNOSIS — Z88.9 H/O SEASONAL ALLERGIES: ICD-10-CM

## 2022-02-17 RX ORDER — MINERAL OIL
ENEMA (ML) RECTAL
Qty: 90 TABLET | Refills: 0 | Status: SHIPPED | OUTPATIENT
Start: 2022-02-17 | End: 2022-07-18 | Stop reason: SDUPTHER

## 2022-02-17 RX ORDER — MONTELUKAST SODIUM 10 MG/1
TABLET ORAL
Qty: 30 TABLET | Refills: 0 | Status: SHIPPED | OUTPATIENT
Start: 2022-02-17 | End: 2022-02-24 | Stop reason: ALTCHOICE

## 2022-02-24 ENCOUNTER — OFFICE VISIT (OUTPATIENT)
Dept: PRIMARY CARE CLINIC | Age: 37
End: 2022-02-24
Payer: COMMERCIAL

## 2022-02-24 VITALS
RESPIRATION RATE: 18 BRPM | TEMPERATURE: 97.6 F | OXYGEN SATURATION: 98 % | HEIGHT: 67 IN | BODY MASS INDEX: 31.86 KG/M2 | HEART RATE: 87 BPM | SYSTOLIC BLOOD PRESSURE: 132 MMHG | DIASTOLIC BLOOD PRESSURE: 79 MMHG | WEIGHT: 203 LBS

## 2022-02-24 DIAGNOSIS — Z88.9 H/O SEASONAL ALLERGIES: ICD-10-CM

## 2022-02-24 DIAGNOSIS — F10.11 H/O ALCOHOL ABUSE: ICD-10-CM

## 2022-02-24 DIAGNOSIS — I10 ESSENTIAL HYPERTENSION: Primary | ICD-10-CM

## 2022-02-24 DIAGNOSIS — F32.A ANXIETY AND DEPRESSION: ICD-10-CM

## 2022-02-24 DIAGNOSIS — M62.82 NON-TRAUMATIC RHABDOMYOLYSIS: ICD-10-CM

## 2022-02-24 DIAGNOSIS — K21.9 GASTROESOPHAGEAL REFLUX DISEASE WITHOUT ESOPHAGITIS: ICD-10-CM

## 2022-02-24 DIAGNOSIS — E66.09 CLASS 1 OBESITY DUE TO EXCESS CALORIES WITH SERIOUS COMORBIDITY AND BODY MASS INDEX (BMI) OF 32.0 TO 32.9 IN ADULT: ICD-10-CM

## 2022-02-24 DIAGNOSIS — F41.9 ANXIETY AND DEPRESSION: ICD-10-CM

## 2022-02-24 PROCEDURE — 99214 OFFICE O/P EST MOD 30 MIN: CPT | Performed by: NURSE PRACTITIONER

## 2022-02-24 RX ORDER — ESOMEPRAZOLE MAGNESIUM 20 MG/1
20 FOR SUSPENSION ORAL DAILY
Qty: 90 PACKET | Refills: 0 | Status: SHIPPED | OUTPATIENT
Start: 2022-02-24 | End: 2022-05-08 | Stop reason: SDUPTHER

## 2022-02-24 RX ORDER — FLUTICASONE PROPIONATE 50 MCG
2 SPRAY, SUSPENSION (ML) NASAL DAILY
Qty: 16 G | Refills: 5 | Status: SHIPPED | OUTPATIENT
Start: 2022-02-24

## 2022-02-24 NOTE — PROGRESS NOTES
Mahala Kanner Sr. is a 29 y.o. male who presents to the office today for the following:    Chief Complaint   Patient presents with    Hypertension    Medication Evaluation       Past Medical History:   Diagnosis Date    Acute gastritis without hemorrhage 4/21/2021    Alcohol abuse 4/21/2021    Anxiety and depression 4/21/2021    Class 1 obesity due to excess calories with serious comorbidity and body mass index (BMI) of 32.0 to 32.9 in adult 4/21/2021    Constipation 4/8/2021    Essential hypertension 4/21/2021    GERD (gastroesophageal reflux disease)     Impingement syndrome of left shoulder 4/21/2021    Irritable bowel syndrome 6/25/2018    Irritable bowel syndrome     Non-traumatic rhabdomyolysis 12/24/2020       Past Surgical History:   Procedure Laterality Date    COLONOSCOPY N/A 5/22/2018    [facility]el [facility] ENDOSCOPY VISIT-OUTPATIENT  2012    HX OTHER SURGICAL      biopsy taken of left arm infection/        Family History   Problem Relation Age of Onset    Heart Disease Mother     Diabetes Mother     Hypertension Mother     Liver Disease Father     Cancer Father     Prostate Cancer Father         Social History     Tobacco Use    Smoking status: Former Smoker     Packs/day: 0.50     Years: 20.00     Pack years: 10.00    Smokeless tobacco: Never Used    Tobacco comment: 3 pks/week   Vaping Use    Vaping Use: Former   Substance Use Topics    Alcohol use: Not Currently     Comment: Patient reports he has quit drinking alcohol x 1 month.  Drug use: Not Currently     Types: Cocaine, Marijuana, Prescription     Comment: 3 months ago marijuana      HPI  Patient here for 2 week follow up of blood pressure with PMH of GERD, IBS-D, ETOH Abuse, tobacco dependence, hypokalemia, recurrent rhabdomyolysis and obesity. States that he is taking medicines as directed. No side effects with taking the amlodipine.  Does want to discuss switching back to nexium as he started getting this over the counter again and feels it controls symptoms  better than prontix. Current Outpatient Medications on File Prior to Visit   Medication Sig    fexofenadine (ALLEGRA) 180 mg tablet Take 1/2 (one-half) tablet by mouth once daily    amLODIPine (NORVASC) 2.5 mg tablet Take 1 Tablet by mouth daily.  hydrOXYzine HCL (ATARAX) 25 mg tablet Take 1 Tablet by mouth every six (6) hours as needed for Itching.  naltrexone (DEPADE) 50 mg tablet Take 1 Tablet by mouth daily for 90 days.  hyoscyamine SR (LEVBID) 0.375 mg SR tablet Take 1 Tablet by mouth every twelve (12) hours as needed for Cramping.  [DISCONTINUED] montelukast (SINGULAIR) 10 mg tablet Take 1 tablet by mouth once daily    [DISCONTINUED] fluticasone propionate (FLONASE) 50 mcg/actuation nasal spray 2 Sprays by Both Nostrils route daily.  [DISCONTINUED] pantoprazole (PROTONIX) 40 mg tablet Take 1 Tablet by mouth daily. No current facility-administered medications on file prior to visit. Medications Ordered Today   Medications    Esomeprazole Magnesium     Sig: Take 1 Packet by mouth daily for 90 days. Dispense:  90 Packet     Refill:  0    fluticasone propionate (FLONASE) 50 mcg/actuation nasal spray     Si Sprays by Both Nostrils route daily. Dispense:  16 g     Refill:  5        Review of Systems   Constitutional: Negative. Eyes: Negative. Respiratory: Negative. Cardiovascular: Negative. Gastrointestinal: Negative for abdominal pain, blood in stool, constipation, diarrhea, heartburn, melena, nausea and vomiting. Genitourinary: Negative. Musculoskeletal: Positive for myalgias. Neurological: Negative. Psychiatric/Behavioral: Negative for depression, hallucinations, memory loss, substance abuse and suicidal ideas. The patient is not nervous/anxious.         Visit Vitals  /79 (BP 1 Location: Left upper arm, BP Patient Position: Sitting, BP Cuff Size: Adult)   Pulse 87   Temp 97.6 °F (36.4 °C) (Temporal)   Resp 18   Ht 5' 7\" (1.702 m)   Wt 203 lb (92.1 kg)   SpO2 98%   BMI 31.79 kg/m²       Physical Exam  Vitals and nursing note reviewed. Constitutional:       Appearance: Normal appearance. He is obese. Eyes:      Pupils: Pupils are equal, round, and reactive to light. Cardiovascular:      Rate and Rhythm: Normal rate and regular rhythm. Pulses: Normal pulses. Heart sounds: Normal heart sounds. Pulmonary:      Effort: Pulmonary effort is normal.      Breath sounds: Normal breath sounds. Abdominal:      General: Bowel sounds are normal.      Palpations: Abdomen is soft. Tenderness: There is no abdominal tenderness. Musculoskeletal:         General: Normal range of motion. Skin:     General: Skin is warm and dry. Neurological:      Mental Status: He is alert and oriented to person, place, and time. Mental status is at baseline. 1. Essential hypertension  Blood pressure is controlled today  Tolerating the amlodipine with no side effects reported  Continue to monitor at home and notify provider if > 140/90    2. Anxiety and depression  Reports stable    3. Class 1 obesity due to excess calories with serious comorbidity and body mass index (BMI) of 32.0 to 32.9 in adult  Continue to encourage following healthy diet and getting regular exercise 3-5 times weekly     4. H/O seasonal allergies  Reports stable but uses with season changes   - fluticasone propionate (FLONASE) 50 mcg/actuation nasal spray; 2 Sprays by Both Nostrils route daily. Dispense: 16 g; Refill: 5    5. History of Alcohol abuse  He does report not using alcohol and doing well with naltrexone. Followed by psychiatry    6.  GERD  He prefers nexium over protonix and will switch back  Overall he feels symptoms controlled as he is using OTC nexium currently  Continue to avoid alcohol, elevate hob prn, avoid spicy foods or other foods that exacerbate symptoms, NO NSAID use. - esomeprazole (NexIUM) 40 mg capsule; Take 1 Cap by mouth daily for 90 days. Dispense: 90 Cap; Refill: 0    7. Non-traumatic rhabdomyolysis  No  reocurrence and only occasional cramping  Has seen nuerology and not using alcohol      Patient verbalizes understanding of plan of care as discussed above    Follow-up and Dispositions    · Return in about 6 months (around 8/24/2022) for or sooner for worsening symptoms.

## 2022-02-24 NOTE — PROGRESS NOTES
Chief Complaint   Patient presents with    Hypertension    Medication Evaluation     1. Have you been to the ER, urgent care clinic since your last visit? Hospitalized since your last visit? No    2. Have you seen or consulted any other health care providers outside of the [address] [place] [address] since your last visit? Include any pap smears or colon screening.  No

## 2022-02-26 ENCOUNTER — HOSPITAL ENCOUNTER (EMERGENCY)
Age: 37
Discharge: HOME | End: 2022-02-26
Attending: EMERGENCY MEDICINE
Payer: COMMERCIAL

## 2022-02-26 VITALS
HEART RATE: 84 BPM | HEIGHT: 67 IN | OXYGEN SATURATION: 99 % | SYSTOLIC BLOOD PRESSURE: 164 MMHG | WEIGHT: 203 LBS | RESPIRATION RATE: 18 BRPM | DIASTOLIC BLOOD PRESSURE: 102 MMHG | TEMPERATURE: 97.9 F | BODY MASS INDEX: 31.86 KG/M2

## 2022-02-26 DIAGNOSIS — H60.501 ACUTE OTITIS EXTERNA OF RIGHT EAR, UNSPECIFIED TYPE: Primary | ICD-10-CM

## 2022-02-26 PROCEDURE — 99283 EMERGENCY DEPT VISIT LOW MDM: CPT

## 2022-02-26 PROCEDURE — 74011250637 HC RX REV CODE- 250/637: Performed by: EMERGENCY MEDICINE

## 2022-02-26 RX ORDER — NEOMYCIN SULFATE, POLYMYXIN B SULFATE AND HYDROCORTISONE 10; 3.5; 1 MG/ML; MG/ML; [USP'U]/ML
3 SUSPENSION/ DROPS AURICULAR (OTIC) 4 TIMES DAILY
Qty: 10 ML | Refills: 1 | Status: SHIPPED | OUTPATIENT
Start: 2022-02-26 | End: 2022-03-03

## 2022-02-26 RX ORDER — ACETAMINOPHEN 500 MG
1000 TABLET ORAL ONCE
Status: COMPLETED | OUTPATIENT
Start: 2022-02-26 | End: 2022-02-26

## 2022-02-26 RX ADMIN — ACETAMINOPHEN 1000 MG: 500 TABLET ORAL at 08:35

## 2022-02-26 NOTE — ED TRIAGE NOTES
Patient reports pain to right ear & headache on the right side that started last night. 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 States that he feels like it is swollen on the inside. Reports hearing is diminished in affected ear.

## 2022-02-26 NOTE — Clinical Note
[address]  [facility] EMERGENCY DEPT  [phone]  [phone]    Work/School Note    Date: 2/26/2022    To Whom It May concern:      Josh Marie . was seen and treated today in the emergency room by the following provider(s):  Attending Provider: Jocy Higuera MD.      Moises Dunlap is excused from work/school on 02/26/22. He is clear to return to work/school on 02/27/22.         Sincerely,          Naomi Leyden, MD

## 2022-02-26 NOTE — ED PROVIDER NOTES
HPI 44-year-old male presents ED with right earache beginning 2 days ago this was following a course of oral amoxicillin for a dental infection. No fever mild decrease in hearing. Past Medical History:   Diagnosis Date    Acute gastritis without hemorrhage 4/21/2021    Alcohol abuse 4/21/2021    Anxiety and depression 4/21/2021    Class 1 obesity due to excess calories with serious comorbidity and body mass index (BMI) of 32.0 to 32.9 in adult 4/21/2021    Constipation 4/8/2021    Essential hypertension 4/21/2021    GERD (gastroesophageal reflux disease)     Impingement syndrome of left shoulder 4/21/2021    Irritable bowel syndrome 6/25/2018    Irritable bowel syndrome     Non-traumatic rhabdomyolysis 12/24/2020       Past Surgical History:   Procedure Laterality Date    COLONOSCOPY N/A 5/22/2018    [facility] Concetta [facility] ENDOSCOPY VISIT-OUTPATIENT  2012    HX OTHER SURGICAL      biopsy taken of left arm infection/         Family History:   Problem Relation Age of Onset    Heart Disease Mother     Diabetes Mother     Hypertension Mother     Liver Disease Father     Cancer Father     Prostate Cancer Father        Social History     Socioeconomic History    Marital status:      Spouse name: Not on file    Number of children: 3    Years of education: Not on file    Highest education level: 10th grade   Occupational History    Not on file   Tobacco Use    Smoking status: Former Smoker     Packs/day: 0.50     Years: 20.00     Pack years: 10.00    Smokeless tobacco: Never Used    Tobacco comment: 3 pks/week   Vaping Use    Vaping Use: Former   Substance and Sexual Activity    Alcohol use: Not Currently     Comment: Patient reports he has quit drinking alcohol x 1 month.     Drug use: Not Currently     Types: Cocaine, Marijuana, Prescription     Comment: 3 months ago marijuana    Sexual activity: Yes     Partners: Female   Other Topics Concern    Not on file   Social History Narrative    Not on file     Social Determinants of Health     Financial Resource Strain: Low Risk     Difficulty of Paying Living Expenses: Not hard at all   Food Insecurity: No Food Insecurity    Worried About Running Out of Food in the Last Year: Never true    [address] in the Last Year: Never true   Transportation Needs: No Transportation Needs    Lack of Transportation (Medical): No    Lack of Transportation (Non-Medical): No   Physical Activity:     Days of Exercise per Week: Not on file    Minutes of Exercise per Session: Not on file   Stress:     Feeling of Stress : Not on file   Social Connections:     Frequency of Communication with Friends and Family: Not on file    Frequency of Social Gatherings with Friends and Family: Not on file    Attends Hinduism Services: Not on file    Active Member of [address] [place] [address] or Organizations: Not on file    Attends Club or Organization Meetings: Not on file    Marital Status: Not on file   Intimate Partner Violence:     Fear of Current or Ex-Partner: Not on file    Emotionally Abused: Not on file    Physically Abused: Not on file    Sexually Abused: Not on file   Housing Stability:     Unable to Pay for Housing in the Last Year: Not on file    Number of [place] in the Last Year: Not on file    Unstable Housing in the Last Year: Not on file         ALLERGIES: Cashew nut, Egg derived, Milk, Onion, Peanut, Tomato, and Nsaids (non-steroidal anti-inflammatory drug)    Review of Systems   Constitutional: Negative for appetite change, chills, diaphoresis and fever. HENT: Positive for ear pain. Negative for ear discharge, facial swelling, sinus pain and trouble swallowing. Eyes: Negative for redness and visual disturbance. Respiratory: Negative for cough, choking, chest tightness, shortness of breath, wheezing and stridor. Cardiovascular: Negative for chest pain, palpitations and leg swelling. Gastrointestinal: Negative for abdominal distention, abdominal pain, blood in stool, diarrhea, nausea and vomiting. Endocrine: Negative for polydipsia and polyuria. Genitourinary: Negative for difficulty urinating, dysuria, flank pain, frequency, hematuria and urgency. Musculoskeletal: Negative. Allergic/Immunologic: Negative. Neurological: Negative. Psychiatric/Behavioral: Negative. Vitals:    02/26/22 0823   BP: (!) 164/102   Pulse: 84   Resp: 18   Temp: 97.9 °F (36.6 °C)   SpO2: 99%   Weight: 92.1 kg (203 lb)   Height: 5' 7\" (1.702 m)          Healthy young male no acute distress  Physical Exam  Vitals and nursing note reviewed. Constitutional:       General: He is not in acute distress. Appearance: Normal appearance. He is normal weight. He is not ill-appearing. HENT:      Head: Normocephalic and atraumatic. Right Ear: Tympanic membrane normal.      Left Ear: Tympanic membrane normal.      Ears:      Comments: There is a right otitis externa canal is not closed no significant cerumen minimal whitish discharge; positive tragal tenderness     Nose: Nose normal.      Mouth/Throat:      Mouth: Mucous membranes are moist.      Pharynx: No oropharyngeal exudate or posterior oropharyngeal erythema. Eyes:      Extraocular Movements: Extraocular movements intact. Pupils: Pupils are equal, round, and reactive to light. Musculoskeletal:      Cervical back: Normal range of motion and neck supple. No rigidity or tenderness. Neurological:      Mental Status: He is alert.    Psychiatric:         Mood and Affect: Mood normal.         Behavior: Behavior normal.          MDM otitis externa - will treat with Cortisporin otic       Procedures

## 2022-03-07 ENCOUNTER — OFFICE VISIT (OUTPATIENT)
Dept: BEHAVIORAL/MENTAL HEALTH CLINIC | Age: 37
End: 2022-03-07
Payer: COMMERCIAL

## 2022-03-07 VITALS — WEIGHT: 208.8 LBS | BODY MASS INDEX: 32.7 KG/M2

## 2022-03-07 DIAGNOSIS — F33.42 MAJOR DEPRESSION, RECURRENT, FULL REMISSION (HCC): Primary | ICD-10-CM

## 2022-03-07 PROCEDURE — 99213 OFFICE O/P EST LOW 20 MIN: CPT | Performed by: NURSE PRACTITIONER

## 2022-03-07 RX ORDER — BUPROPION HYDROCHLORIDE 150 MG/1
150 TABLET, EXTENDED RELEASE ORAL 2 TIMES DAILY
Qty: 180 TABLET | Refills: 1 | Status: SHIPPED | OUTPATIENT
Start: 2022-03-07 | End: 2022-06-05
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
## 2022-03-18 PROBLEM — E66.09 CLASS 1 OBESITY DUE TO EXCESS CALORIES WITH SERIOUS COMORBIDITY AND BODY MASS INDEX (BMI) OF 32.0 TO 32.9 IN ADULT: Status: ACTIVE | Noted: 2021-04-21

## 2022-03-18 PROBLEM — K29.00 ACUTE GASTRITIS WITHOUT HEMORRHAGE: Status: ACTIVE | Noted: 2021-04-21

## 2022-03-18 PROBLEM — F10.10 ALCOHOL ABUSE: Status: ACTIVE | Noted: 2021-04-21

## 2022-03-18 PROBLEM — F41.9 ANXIETY AND DEPRESSION: Status: ACTIVE | Noted: 2021-04-21

## 2022-03-18 PROBLEM — F32.A ANXIETY AND DEPRESSION: Status: ACTIVE | Noted: 2021-04-21

## 2022-03-18 PROBLEM — E66.811 CLASS 1 OBESITY DUE TO EXCESS CALORIES WITH SERIOUS COMORBIDITY AND BODY MASS INDEX (BMI) OF 32.0 TO 32.9 IN ADULT: Status: ACTIVE | Noted: 2021-04-21

## 2022-03-18 PROBLEM — K58.9 IRRITABLE BOWEL SYNDROME: Status: ACTIVE | Noted: 2018-06-25

## 2022-03-19 PROBLEM — K21.9 GERD (GASTROESOPHAGEAL REFLUX DISEASE): Status: ACTIVE | Noted: 2021-04-08

## 2022-03-19 PROBLEM — M62.82 NON-TRAUMATIC RHABDOMYOLYSIS: Status: ACTIVE | Noted: 2020-12-24

## 2022-03-19 PROBLEM — I10 ESSENTIAL HYPERTENSION: Status: ACTIVE | Noted: 2021-04-21

## 2022-03-20 PROBLEM — K59.09 CHRONIC CONSTIPATION: Status: ACTIVE | Noted: 2021-04-08

## 2022-03-21 ENCOUNTER — APPOINTMENT (OUTPATIENT)
Dept: GENERAL RADIOLOGY | Age: 37
End: 2022-03-21
Attending: EMERGENCY MEDICINE
Payer: COMMERCIAL

## 2022-03-21 ENCOUNTER — HOSPITAL ENCOUNTER (EMERGENCY)
Age: 37
Discharge: HOME | End: 2022-03-21
Attending: EMERGENCY MEDICINE
Payer: COMMERCIAL

## 2022-03-21 VITALS
BODY MASS INDEX: 32.18 KG/M2 | HEIGHT: 67 IN | TEMPERATURE: 98.8 F | WEIGHT: 205 LBS | OXYGEN SATURATION: 97 % | DIASTOLIC BLOOD PRESSURE: 70 MMHG | SYSTOLIC BLOOD PRESSURE: 116 MMHG | RESPIRATION RATE: 18 BRPM | HEART RATE: 97 BPM

## 2022-03-21 DIAGNOSIS — L03.011 CELLULITIS OF RIGHT MIDDLE FINGER: Primary | ICD-10-CM

## 2022-03-21 PROCEDURE — 74011250637 HC RX REV CODE- 250/637: Performed by: EMERGENCY MEDICINE

## 2022-03-21 PROCEDURE — 99284 EMERGENCY DEPT VISIT MOD MDM: CPT

## 2022-03-21 PROCEDURE — 74011250636 HC RX REV CODE- 250/636: Performed by: EMERGENCY MEDICINE

## 2022-03-21 PROCEDURE — 73130 X-RAY EXAM OF HAND: CPT

## 2022-03-21 PROCEDURE — 96372 THER/PROPH/DIAG INJ SC/IM: CPT

## 2022-03-21 RX ORDER — IBUPROFEN 600 MG/1
600 TABLET ORAL
Qty: 20 TABLET | Refills: 0 | Status: SHIPPED | OUTPATIENT
Start: 2022-03-21

## 2022-03-21 RX ORDER — CEPHALEXIN 500 MG/1
500 CAPSULE ORAL 4 TIMES DAILY
Qty: 28 CAPSULE | Refills: 0 | Status: SHIPPED | OUTPATIENT
Start: 2022-03-21 | End: 2022-03-28

## 2022-03-21 RX ORDER — KETOROLAC TROMETHAMINE 30 MG/ML
60 INJECTION, SOLUTION INTRAMUSCULAR; INTRAVENOUS
Status: COMPLETED | OUTPATIENT
Start: 2022-03-21 | End: 2022-03-21

## 2022-03-21 RX ORDER — CEPHALEXIN 250 MG/1
500 CAPSULE ORAL
Status: COMPLETED | OUTPATIENT
Start: 2022-03-21 | End: 2022-03-21

## 2022-03-21 RX ADMIN — CEPHALEXIN 500 MG: 250 CAPSULE ORAL at 17:01

## 2022-03-21 RX ADMIN — KETOROLAC TROMETHAMINE 60 MG: 30 INJECTION, SOLUTION INTRAMUSCULAR at 17:01
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
## 2022-03-21 NOTE — ED NOTES
Dr. Armen Garica reviewed discharge instructions with the patient. The patient verbalized understanding. All questions and concerns were addressed. The patient declined a wheelchair and is discharged ambulatory in the care of family members with instructions and prescriptions in hand. Pt is alert and oriented x 4. Respirations are clear and unlabored.

## 2022-03-21 NOTE — ED PROVIDER NOTES
EMERGENCY DEPARTMENT HISTORY AND PHYSICAL EXAM      Date: 3/21/2022  Patient Name: Denilson Jauregui. History of Presenting Illness     Chief Complaint   Patient presents with    Finger Swelling       History Provided By: Patient    HPI: Kaylynn Akbar Sr., 29 y.o. male with a past medical history significant hypertension and GERD presents to the ED with cc of right third finger swelling tenderness for 3 days, patient denies any blunt trauma injuries recently    There are no other complaints, changes, or physical findings at this time. PCP: Mony Jeffers NP    No current facility-administered medications on file prior to encounter. Current Outpatient Medications on File Prior to Encounter   Medication Sig Dispense Refill    buPROPion SR (WELLBUTRIN SR) 150 mg SR tablet Take 1 Tablet by mouth two (2) times a day for 90 days. 180 Tablet 1    Esomeprazole Magnesium Take 1 Packet by mouth daily for 90 days. 90 Packet 0    fluticasone propionate (FLONASE) 50 mcg/actuation nasal spray 2 Sprays by Both Nostrils route daily. 16 g 5    fexofenadine (ALLEGRA) 180 mg tablet Take 1/2 (one-half) tablet by mouth once daily 90 Tablet 0    amLODIPine (NORVASC) 2.5 mg tablet Take 1 Tablet by mouth daily. 90 Tablet 0    hydrOXYzine HCL (ATARAX) 25 mg tablet Take 1 Tablet by mouth every six (6) hours as needed for Itching. 120 Tablet 0    hyoscyamine SR (LEVBID) 0.375 mg SR tablet Take 1 Tablet by mouth every twelve (12) hours as needed for Cramping.  61 Tablet 2       Past History     Past Medical History:  Past Medical History:   Diagnosis Date    Acute gastritis without hemorrhage 4/21/2021    Alcohol abuse 4/21/2021    Anxiety and depression 4/21/2021    Class 1 obesity due to excess calories with serious comorbidity and body mass index (BMI) of 32.0 to 32.9 in adult 4/21/2021    Constipation 4/8/2021    Essential hypertension 4/21/2021    GERD (gastroesophageal reflux disease)     Impingement syndrome of left shoulder 4/21/2021    Irritable bowel syndrome 6/25/2018    Irritable bowel syndrome     Non-traumatic rhabdomyolysis 12/24/2020       Past Surgical History:  Past Surgical History:   Procedure Laterality Date    COLONOSCOPY N/A 5/22/2018    COLONOSCOPY performed by Beto Vu MD at [address] [facility] ENDOSCOPY VISIT-OUTPATIENT  2012    HX OTHER SURGICAL      biopsy taken of left arm infection/       Family History:  Family History   Problem Relation Age of Onset    Heart Disease Mother     Diabetes Mother     Hypertension Mother     Liver Disease Father     Cancer Father     Prostate Cancer Father        Social History:  Social History     Tobacco Use    Smoking status: Current Every Day Smoker     Years: 20.00     Types: Cigarettes    Smokeless tobacco: Never Used    Tobacco comment: 1 pack every 3 days   Vaping Use    Vaping Use: Former   Substance Use Topics    Alcohol use: Not Currently     Comment: Patient reports he has quit drinking alcohol x 1 month.  Drug use: Not Currently     Types: Cocaine, Marijuana, Prescription     Comment: 3 months ago marijuana       Allergies: Allergies   Allergen Reactions    Cashew Nut Hives    Egg Derived Hives    Milk Other (comments)     GI issues    Onion Hives    Peanut Other (comments)     GI issues    Tomato Hives    Nsaids (Non-Steroidal Anti-Inflammatory Drug) Other (comments)     H/o ulcers         Review of Systems     Review of Systems   Constitutional: Negative for chills and fever. HENT: Negative for rhinorrhea and sore throat. Eyes: Negative for pain and visual disturbance. Respiratory: Negative for cough and shortness of breath. Cardiovascular: Negative for chest pain and leg swelling. Gastrointestinal: Negative for abdominal pain and vomiting. Endocrine: Negative for polydipsia and polyuria. Genitourinary: Negative for dysuria and hematuria. Musculoskeletal: Positive for arthralgias and joint swelling. Negative for back pain and neck pain. Skin: Negative for color change and pallor. Neurological: Negative for weakness and headaches. Psychiatric/Behavioral: Negative for agitation and suicidal ideas. Physical Exam     Physical Exam  Vitals and nursing note reviewed. Constitutional:       General: He is not in acute distress. Appearance: He is not ill-appearing, toxic-appearing or diaphoretic. HENT:      Head: Normocephalic and atraumatic. Right Ear: Tympanic membrane normal.      Left Ear: Tympanic membrane normal.      Nose: Nose normal. No congestion. Mouth/Throat:      Mouth: Mucous membranes are moist.      Pharynx: Oropharynx is clear. Eyes:      Extraocular Movements: Extraocular movements intact. Conjunctiva/sclera: Conjunctivae normal.      Pupils: Pupils are equal, round, and reactive to light. Cardiovascular:      Rate and Rhythm: Normal rate and regular rhythm. Pulses: Normal pulses. Heart sounds: Normal heart sounds. Pulmonary:      Effort: Pulmonary effort is normal.      Breath sounds: Normal breath sounds. Abdominal:      General: Bowel sounds are normal.      Palpations: Abdomen is soft. Tenderness: There is no abdominal tenderness. Musculoskeletal:         General: No deformity or signs of injury. Normal range of motion. Right hand: Swelling and tenderness present. No deformity. Left hand: Normal.      Cervical back: Normal range of motion and neck supple. No rigidity or tenderness. Comments: Right third finger swollen no erythema soft tissue tenderness skin intact   Lymphadenopathy:      Cervical: No cervical adenopathy. Skin:     General: Skin is warm and dry. Capillary Refill: Capillary refill takes less than 2 seconds. Findings: No rash. Neurological:      General: No focal deficit present. Mental Status: He is alert and oriented to person, place, and time. Cranial Nerves: No cranial nerve deficit. Sensory: No sensory deficit. Psychiatric:         Mood and Affect: Mood normal.         Behavior: Behavior normal.         Lab and Diagnostic Study Results     Labs -   No results found for this or any previous visit (from the past 12 hour(s)). Radiologic Studies -   @lastxrresult@  CT Results  (Last 48 hours)    None        CXR Results  (Last 48 hours)    None            Medical Decision Making   - I am the first provider for this patient. - I reviewed the vital signs, available nursing notes, past medical history, past surgical history, family history and social history. - Initial assessment performed. The patients presenting problems have been discussed, and they are in agreement with the care plan formulated and outlined with them. I have encouraged them to ask questions as they arise throughout their visit. Vital Signs-Reviewed the patient's vital signs. Patient Vitals for the past 12 hrs:   Temp Pulse Resp BP SpO2   03/21/22 1613 98.8 °F (37.1 °C) 97 18 116/70 97 %       Records Reviewed: Nursing Notes    The patient presents with hand pain with a differential diagnosis of closed fracture, ligamentous injury, cellulitis      ED Course:          Provider Notes (Medical Decision Making): MDM       Procedures   Medical Decision Makingedical Decision Making  Performed by: Shade Gonzalez MD  PROCEDURES:  Procedures       Disposition   Disposition: Condition stable and improved  DC- Adult Discharges: All of the diagnostic tests were reviewed and questions answered. Diagnosis, care plan and treatment options were discussed. The patient understands the instructions and will follow up as directed. The patients results have been reviewed with them. They have been counseled regarding their diagnosis.   The patient verbally convey understanding and agreement of the signs, symptoms, diagnosis, treatment and prognosis and additionally agrees to follow up as recommended with their PCP in 24 - 48 hours. They also agree with the care-plan and convey that all of their questions have been answered. I have also put together some discharge instructions for them that include: 1) educational information regarding their diagnosis, 2) how to care for their diagnosis at home, as well a 3) list of reasons why they would want to return to the ED prior to their follow-up appointment, should their condition change. DISCHARGE PLAN:  1. Current Discharge Medication List      CONTINUE these medications which have NOT CHANGED    Details   buPROPion SR (WELLBUTRIN SR) 150 mg SR tablet Take 1 Tablet by mouth two (2) times a day for 90 days. Qty: 180 Tablet, Refills: 1    Associated Diagnoses: Major depression, recurrent, full remission (HCC)      Esomeprazole Magnesium Take 1 Packet by mouth daily for 90 days. Qty: 90 Packet, Refills: 0    Associated Diagnoses: Gastroesophageal reflux disease without esophagitis      fluticasone propionate (FLONASE) 50 mcg/actuation nasal spray 2 Sprays by Both Nostrils route daily. Qty: 16 g, Refills: 5    Associated Diagnoses: H/O seasonal allergies      fexofenadine (ALLEGRA) 180 mg tablet Take 1/2 (one-half) tablet by mouth once daily  Qty: 90 Tablet, Refills: 0    Associated Diagnoses: H/O seasonal allergies      amLODIPine (NORVASC) 2.5 mg tablet Take 1 Tablet by mouth daily. Qty: 90 Tablet, Refills: 0    Associated Diagnoses: Essential hypertension      hydrOXYzine HCL (ATARAX) 25 mg tablet Take 1 Tablet by mouth every six (6) hours as needed for Itching. Qty: 120 Tablet, Refills: 0    Associated Diagnoses: H/O seasonal allergies      hyoscyamine SR (LEVBID) 0.375 mg SR tablet Take 1 Tablet by mouth every twelve (12) hours as needed for Cramping. Qty: 60 Tablet, Refills: 2           2. Follow-up Information    None       3. Return to ED if worse   4.    Current Discharge Medication List            Diagnosis     Clinical Impression: No diagnosis found. Attestations:    Debbie Gallagher MD    Please note that this dictation was completed with Learnerator, the computer voice recognition software. Quite often unanticipated grammatical, syntax, homophones, and other interpretive errors are inadvertently transcribed by the computer software. Please disregard these errors. Please excuse any errors that have escaped final proofreading. Thank you.

## 2022-03-28 RX ORDER — MONTELUKAST SODIUM 10 MG/1
TABLET ORAL
Qty: 30 TABLET | Refills: 0 | Status: SHIPPED | OUTPATIENT
Start: 2022-03-28 | End: 2022-05-08 | Stop reason: SDUPTHER

## 2022-03-29 RX ORDER — HYOSCYAMINE SULFATE 0.38 MG/1
375 TABLET, EXTENDED RELEASE ORAL
Qty: 60 TABLET | Refills: 2 | Status: SHIPPED | OUTPATIENT
Start: 2022-03-29 | End: 2022-09-18

## 2022-04-15 ENCOUNTER — HOSPITAL ENCOUNTER (EMERGENCY)
Age: 37
Discharge: HOME | End: 2022-04-15
Attending: EMERGENCY MEDICINE
Payer: COMMERCIAL

## 2022-04-15 VITALS
DIASTOLIC BLOOD PRESSURE: 92 MMHG | HEART RATE: 102 BPM | SYSTOLIC BLOOD PRESSURE: 149 MMHG | RESPIRATION RATE: 20 BRPM | HEIGHT: 67 IN | OXYGEN SATURATION: 100 % | TEMPERATURE: 98.5 F | BODY MASS INDEX: 31.22 KG/M2 | WEIGHT: 198.9 LBS

## 2022-04-15 DIAGNOSIS — M60.9 MYOSITIS, UNSPECIFIED MYOSITIS TYPE, UNSPECIFIED SITE: Primary | ICD-10-CM

## 2022-04-15 LAB
ALBUMIN SERPL-MCNC: 4.3 G/DL (ref 3.5–5)
ALBUMIN/GLOB SERPL: 1.2 {RATIO} (ref 1.1–2.2)
ALP SERPL-CCNC: 69 U/L (ref 45–117)
ALT SERPL-CCNC: 45 U/L (ref 12–78)
ANION GAP SERPL CALC-SCNC: 10 MMOL/L (ref 5–15)
APPEARANCE UR: CLEAR
AST SERPL W P-5'-P-CCNC: 31 U/L (ref 15–37)
BACTERIA URNS QL MICRO: NEGATIVE /HPF
BASOPHILS # BLD: 0 K/UL (ref 0–0.2)
BASOPHILS NFR BLD: 0 % (ref 0–2.5)
BILIRUB SERPL-MCNC: 0.7 MG/DL (ref 0.2–1)
BILIRUB UR QL: NEGATIVE
BUN SERPL-MCNC: 10 MG/DL (ref 6–20)
BUN/CREAT SERPL: 9 (ref 12–20)
CA-I BLD-MCNC: 9.2 MG/DL (ref 8.5–10.1)
CHLORIDE SERPL-SCNC: 102 MMOL/L (ref 97–108)
CK SERPL-CCNC: 455.23 U/L (ref 39–308)
CO2 SERPL-SCNC: 27 MMOL/L (ref 21–32)
COLOR UR: ABNORMAL
CREAT SERPL-MCNC: 1.08 MG/DL (ref 0.7–1.3)
EOSINOPHIL # BLD: 0.2 K/UL (ref 0–0.7)
EOSINOPHIL NFR BLD: 2 % (ref 0.9–2.9)
ERYTHROCYTE [DISTWIDTH] IN BLOOD BY AUTOMATED COUNT: 14 % (ref 11.5–14.5)
GLOBULIN SER CALC-MCNC: 3.6 G/DL (ref 2–4)
GLUCOSE SERPL-MCNC: 82 MG/DL (ref 65–100)
GLUCOSE UR STRIP.AUTO-MCNC: NEGATIVE MG/DL
HCT VFR BLD AUTO: 40.3 % (ref 41–53)
HGB BLD-MCNC: 13.4 G/DL (ref 13.5–17.5)
HGB UR QL STRIP: NEGATIVE
KETONES UR QL STRIP.AUTO: NEGATIVE MG/DL
LEUKOCYTE ESTERASE UR QL STRIP.AUTO: NEGATIVE
LYMPHOCYTES # BLD: 4.8 K/UL (ref 1–4.8)
LYMPHOCYTES NFR BLD: 38 % (ref 20.5–51.1)
MCH RBC QN AUTO: 28.7 PG (ref 31–34)
MCHC RBC AUTO-ENTMCNC: 33.1 G/DL (ref 31–36)
MCV RBC AUTO: 86.7 FL (ref 80–100)
MONOCYTES # BLD: 0.7 K/UL (ref 0.2–2.4)
MONOCYTES NFR BLD: 6 % (ref 1.7–9.3)
NEUTS SEG # BLD: 6.9 K/UL (ref 1.8–7.7)
NEUTS SEG NFR BLD: 54 % (ref 42–75)
NITRITE UR QL STRIP.AUTO: NEGATIVE
NRBC # BLD: 0 K/UL
NRBC BLD-RTO: 0 PER 100 WBC
PH UR STRIP: 8.5 [PH] (ref 5–8)
PLATELET # BLD AUTO: 348 K/UL (ref 150–400)
PMV BLD AUTO: 7.3 FL (ref 6.5–11.5)
POTASSIUM SERPL-SCNC: 3.5 MMOL/L (ref 3.5–5.1)
PROT SERPL-MCNC: 7.9 G/DL (ref 6.4–8.2)
PROT UR STRIP-MCNC: ABNORMAL MG/DL
RBC # BLD AUTO: 4.65 M/UL (ref 4.5–5.9)
RBC #/AREA URNS HPF: NORMAL /HPF (ref 0–3)
SODIUM SERPL-SCNC: 139 MMOL/L (ref 136–145)
SP GR UR REFRACTOMETRY: 1.01 (ref 1–1.03)
UROBILINOGEN UR QL STRIP.AUTO: 0.2 EU/DL (ref 0.2–1)
WBC # BLD AUTO: 12.7 K/UL (ref 4.4–11.3)
WBC URNS QL MICRO: NORMAL /HPF (ref 0–5)

## 2022-04-15 PROCEDURE — 82550 ASSAY OF CK (CPK): CPT

## 2022-04-15 PROCEDURE — 74011250636 HC RX REV CODE- 250/636: Performed by: EMERGENCY MEDICINE

## 2022-04-15 PROCEDURE — 96375 TX/PRO/DX INJ NEW DRUG ADDON: CPT

## 2022-04-15 PROCEDURE — 99284 EMERGENCY DEPT VISIT MOD MDM: CPT

## 2022-04-15 PROCEDURE — 80053 COMPREHEN METABOLIC PANEL: CPT

## 2022-04-15 PROCEDURE — 36415 COLL VENOUS BLD VENIPUNCTURE: CPT

## 2022-04-15 PROCEDURE — 81001 URINALYSIS AUTO W/SCOPE: CPT

## 2022-04-15 PROCEDURE — 96374 THER/PROPH/DIAG INJ IV PUSH: CPT

## 2022-04-15 PROCEDURE — 85025 COMPLETE CBC W/AUTO DIFF WBC: CPT

## 2022-04-15 RX ORDER — KETOROLAC TROMETHAMINE 30 MG/ML
30 INJECTION, SOLUTION INTRAMUSCULAR; INTRAVENOUS
Status: COMPLETED | OUTPATIENT
Start: 2022-04-15 | End: 2022-04-15

## 2022-04-15 RX ORDER — SODIUM CHLORIDE 9 MG/ML
150 INJECTION, SOLUTION INTRAVENOUS ONCE
Status: DISCONTINUED | OUTPATIENT
Start: 2022-04-15 | End: 2022-04-16 | Stop reason: HOSPADM

## 2022-04-15 RX ORDER — ONDANSETRON 2 MG/ML
4 INJECTION INTRAMUSCULAR; INTRAVENOUS
Status: COMPLETED | OUTPATIENT
Start: 2022-04-15 | End: 2022-04-15

## 2022-04-15 RX ORDER — CYCLOBENZAPRINE HCL 10 MG
10 TABLET ORAL
Qty: 30 TABLET | Refills: 0 | Status: SHIPPED | OUTPATIENT
Start: 2022-04-15

## 2022-04-15 RX ADMIN — SODIUM CHLORIDE 1000 ML: 9 INJECTION, SOLUTION INTRAVENOUS at 20:54

## 2022-04-15 RX ADMIN — ONDANSETRON 4 MG: 2 INJECTION INTRAMUSCULAR; INTRAVENOUS at 20:57

## 2022-04-15 RX ADMIN — KETOROLAC TROMETHAMINE 30 MG: 30 INJECTION, SOLUTION INTRAMUSCULAR at 20:57

## 2022-04-15 NOTE — Clinical Note
[address] [facility] EMERGENCY DEPT  [place] [phone]  [phone]    Work/School Note    Date: 4/15/2022    To Whom It May concern:    Yoel Richard Winkler was seen and treated today in the emergency room by the following provider(s):  Attending Provider: Horacio Gonzales MD.      Stella Falcon. is excused from work/school on 4/15/2022 through 4/17/2022. He is medically clear to return to work/school on 4/18/2022.          Sincerely,          Bernadette Barth MD

## 2022-04-16 NOTE — ED PROVIDER NOTES
Patient presents with complaint of diffuse body aches , starting at 1:30 AM. He has a history of nontraumatic rhabdomyolisis. No fever or chills. C/O nausea. Past Medical History:   Diagnosis Date    Acute gastritis without hemorrhage 4/21/2021    Alcohol abuse 4/21/2021    Anxiety and depression 4/21/2021    Class 1 obesity due to excess calories with serious comorbidity and body mass index (BMI) of 32.0 to 32.9 in adult 4/21/2021    Constipation 4/8/2021    Essential hypertension 4/21/2021    GERD (gastroesophageal reflux disease)     Impingement syndrome of left shoulder 4/21/2021    Irritable bowel syndrome 6/25/2018    Irritable bowel syndrome     Non-traumatic rhabdomyolysis 12/24/2020       Past Surgical History:   Procedure Laterality Date    COLONOSCOPY N/A 5/22/2018    COLONOSCOPY performed by Bernarda Joseph MD at [address] [facility] ENDOSCOPY VISIT-OUTPATIENT  2012    HX OTHER SURGICAL      biopsy taken of left arm infection/         Family History:   Problem Relation Age of Onset    Heart Disease Mother     Diabetes Mother     Hypertension Mother     Liver Disease Father     Cancer Father     Prostate Cancer Father        Social History     Socioeconomic History    Marital status:      Spouse name: Not on file    Number of children: 3    Years of education: Not on file    Highest education level: 10th grade   Occupational History    Not on file   Tobacco Use    Smoking status: Current Every Day Smoker     Years: 20.00     Types: Cigarettes    Smokeless tobacco: Never Used    Tobacco comment: 1 pack every 3 days   Vaping Use    Vaping Use: Former   Substance and Sexual Activity    Alcohol use: Not Currently     Comment: Patient reports he has quit drinking alcohol x 1 month.     Drug use: Not Currently     Types: Cocaine, Marijuana, Prescription     Comment: 3 months ago marijuana    Sexual activity: Yes     Partners: Female   Other Topics Concern    Not on file   Social History Narrative    Not on file     Social Determinants of Health     Financial Resource Strain:     Difficulty of Paying Living Expenses: Not on file   Food Insecurity:     Worried About Running Out of Food in the Last Year: Not on file    Kathy of Food in the Last Year: Not on file   Transportation Needs:     Lack of Transportation (Medical): Not on file    Lack of Transportation (Non-Medical): Not on file   Physical Activity:     Days of Exercise per Week: Not on file    Minutes of Exercise per Session: Not on file   Stress:     Feeling of Stress : Not on file   Social Connections:     Frequency of Communication with Friends and Family: Not on file    Frequency of Social Gatherings with Friends and Family: Not on file    Attends Rastafari Services: Not on file    Active Member of [address] [place] [address] [employer] or Organizations: Not on file    Attends Club or Organization Meetings: Not on file    Marital Status: Not on file   Intimate Partner Violence:     Fear of Current or Ex-Partner: Not on file    Emotionally Abused: Not on file    Physically Abused: Not on file    Sexually Abused: Not on file   Housing Stability:     Unable to Pay for Housing in the Last Year: Not on file    Number of [place] in the Last Year: Not on file    Unstable Housing in the Last Year: Not on file         ALLERGIES: Cashew nut, Egg derived, Milk, Onion, Peanut, Tomato, and Nsaids (non-steroidal anti-inflammatory drug)    Review of Systems   Constitutional: Negative. Negative for chills and fever. HENT: Negative. Eyes: Negative. Respiratory: Negative. Cardiovascular: Negative. Gastrointestinal: Positive for nausea. Endocrine: Negative. Genitourinary: Negative. Musculoskeletal: Positive for arthralgias and myalgias. Skin: Negative. Allergic/Immunologic: Negative. Neurological: Negative. Hematological: Negative. Psychiatric/Behavioral: Negative.         Vitals:    04/15/22 2037 BP: (!) 166/104   Pulse: (!) 102   Resp: 20   Temp: 98.5 °F (36.9 °C)   SpO2: 100%   Weight: 90.2 kg (198 lb 14.4 oz)   Height: 5' 7\" (1.702 m)            Physical Exam  Vitals and nursing note reviewed. HENT:      Head: Normocephalic and atraumatic. Cardiovascular:      Rate and Rhythm: Normal rate and regular rhythm. Pulses: Normal pulses. Heart sounds: Normal heart sounds. Pulmonary:      Effort: Pulmonary effort is normal.      Breath sounds: Normal breath sounds. Abdominal:      General: Abdomen is flat. Bowel sounds are normal.      Palpations: Abdomen is soft. Musculoskeletal:         General: Tenderness present. No swelling or deformity. Normal range of motion. Cervical back: Normal range of motion and neck supple. Comments: Moderate tenderness in arms and legs. Skin:     General: Skin is warm and dry. Neurological:      General: No focal deficit present. Mental Status: He is oriented to person, place, and time. Mental status is at baseline.    Psychiatric:         Mood and Affect: Mood normal.         Behavior: Behavior normal.          MDM       Procedures

## 2022-04-16 NOTE — ED TRIAGE NOTES
Pt states that he has been having pain and cramping all over since last night. Pt has hx of this issue as well. Rate 8/10.

## 2022-04-20 ENCOUNTER — OFFICE VISIT (OUTPATIENT)
Dept: PRIMARY CARE CLINIC | Age: 37
End: 2022-04-20
Payer: COMMERCIAL

## 2022-04-20 VITALS
HEART RATE: 93 BPM | HEIGHT: 67 IN | SYSTOLIC BLOOD PRESSURE: 129 MMHG | BODY MASS INDEX: 31.42 KG/M2 | OXYGEN SATURATION: 98 % | RESPIRATION RATE: 18 BRPM | WEIGHT: 200.2 LBS | TEMPERATURE: 97.4 F | DIASTOLIC BLOOD PRESSURE: 86 MMHG

## 2022-04-20 DIAGNOSIS — M60.9 MYOSITIS, UNSPECIFIED MYOSITIS TYPE, UNSPECIFIED SITE: Primary | ICD-10-CM

## 2022-04-20 DIAGNOSIS — M62.82 NON-TRAUMATIC RHABDOMYOLYSIS: ICD-10-CM

## 2022-04-20 PROCEDURE — 99214 OFFICE O/P EST MOD 30 MIN: CPT | Performed by: NURSE PRACTITIONER

## 2022-04-20 RX ORDER — ONDANSETRON 4 MG/1
4 TABLET, ORALLY DISINTEGRATING ORAL
Qty: 30 TABLET | Refills: 1 | Status: SHIPPED | OUTPATIENT
Start: 2022-04-20

## 2022-04-20 NOTE — PROGRESS NOTES
Chief Complaint   Patient presents with    ED Follow-up    Claudication     Pt stated he was having a flare up and knew he had to go to the ED    1. Have you been to the ER, urgent care clinic since your last visit? Hospitalized since your last visit?[facility]    2. Have you seen or consulted any other health care providers outside of the [address] [place] [address] since your last visit? Include any pap smears or colon screening.  No

## 2022-04-20 NOTE — PROGRESS NOTES
Kiley Macdonald Sr. is a 29 y.o. male who presents to the office today for the following:    Chief Complaint   Patient presents with   Adiel Lozada ED Follow-up    Other     leg cramps       Past Medical History:   Diagnosis Date    Acute gastritis without hemorrhage 4/21/2021    Alcohol abuse 4/21/2021    Anxiety and depression 4/21/2021    Class 1 obesity due to excess calories with serious comorbidity and body mass index (BMI) of 32.0 to 32.9 in adult 4/21/2021    Constipation 4/8/2021    Essential hypertension 4/21/2021    GERD (gastroesophageal reflux disease)     Impingement syndrome of left shoulder 4/21/2021    Irritable bowel syndrome 6/25/2018    Irritable bowel syndrome     Non-traumatic rhabdomyolysis 12/24/2020       Past Surgical History:   Procedure Laterality Date    COLONOSCOPY N/A 5/22/2018    COLONOSCOPY performed by Ariana Ch MD at [address] [facility] ENDOSCOPY VISIT-OUTPATIENT  2012    HX OTHER SURGICAL      biopsy taken of left arm infection/        Family History   Problem Relation Age of Onset    Heart Disease Mother     Diabetes Mother     Hypertension Mother     Liver Disease Father     Cancer Father     Prostate Cancer Father         Social History     Tobacco Use    Smoking status: Current Every Day Smoker     Years: 20.00     Types: Cigarettes    Smokeless tobacco: Never Used    Tobacco comment: 1 pack every 3 days   Vaping Use    Vaping Use: Former   Substance Use Topics    Alcohol use: Not Currently     Comment: Patient reports he has quit drinking alcohol     Drug use: Not Currently     Types: Cocaine, Marijuana, Prescription     Comment: 3 months ago marijuana        HPI  Patient here for follow up from ED on 4/15/22 due to suddenly developing diffuse body aches and cramps. Reports that he had worked both of his jobs the day prior to symptoms starting. Due to his history of rhabdomyolysis, he was concerned he would get very sick again and went to ED immediately. Was given IVF and toradol which helped but still having some cramps in lower legs. Also has had some nausea off and on. Is trying to keep hydrated with water and gatorade. Also resting. Needs note for job. Denies any alcohol use. No other recent changes. Current Outpatient Medications on File Prior to Visit   Medication Sig    hyoscyamine SR (LEVBID) 0.375 mg SR tablet Take 1 Tablet by mouth every twelve (12) hours as needed for Cramping.  cyclobenzaprine (FLEXERIL) 10 mg tablet Take 1 Tablet by mouth three (3) times daily as needed for Muscle Spasm(s).  montelukast (SINGULAIR) 10 mg tablet Take 1 tablet by mouth once daily    ibuprofen (MOTRIN) 600 mg tablet Take 1 Tablet by mouth every six (6) hours as needed for Pain.  buPROPion SR (WELLBUTRIN SR) 150 mg SR tablet Take 1 Tablet by mouth two (2) times a day for 90 days.  Esomeprazole Magnesium Take 1 Packet by mouth daily for 90 days.  fluticasone propionate (FLONASE) 50 mcg/actuation nasal spray 2 Sprays by Both Nostrils route daily.  fexofenadine (ALLEGRA) 180 mg tablet Take 1/2 (one-half) tablet by mouth once daily    amLODIPine (NORVASC) 2.5 mg tablet Take 1 Tablet by mouth daily.  hydrOXYzine HCL (ATARAX) 25 mg tablet Take 1 Tablet by mouth every six (6) hours as needed for Itching. No current facility-administered medications on file prior to visit. Medications Ordered Today   Medications    ondansetron (ZOFRAN ODT) 4 mg disintegrating tablet     Sig: Take 1 Tablet by mouth every eight (8) hours as needed for Nausea or Vomiting. Dispense:  30 Tablet     Refill:  1        Review of Systems   Constitutional: Negative. HENT: Negative. Eyes: Negative. Respiratory: Negative. Cardiovascular: Negative. Gastrointestinal: Positive for nausea. Negative for abdominal pain, blood in stool, constipation, diarrhea and vomiting. Genitourinary: Negative. Musculoskeletal: Positive for myalgias.  Negative for back pain, falls and neck pain. Skin: Negative. Neurological: Negative for dizziness, tingling, tremors, sensory change, focal weakness, weakness and headaches. Psychiatric/Behavioral: Negative. Visit Vitals  /86 (BP 1 Location: Left upper arm, BP Patient Position: Sitting, BP Cuff Size: Large adult)   Pulse 93   Temp 97.4 °F (36.3 °C) (Temporal)   Resp 18   Ht 5' 7\" (1.702 m)   Wt 200 lb 3.2 oz (90.8 kg)   SpO2 98%   BMI 31.36 kg/m²       Physical Exam  Vitals and nursing note reviewed. Constitutional:       Appearance: Normal appearance. He is obese. Eyes:      Pupils: Pupils are equal, round, and reactive to light. Cardiovascular:      Rate and Rhythm: Normal rate and regular rhythm. Pulses: Normal pulses. Heart sounds: Normal heart sounds. Pulmonary:      Effort: Pulmonary effort is normal.      Breath sounds: Normal breath sounds. Abdominal:      General: Bowel sounds are normal.      Palpations: Abdomen is soft. Tenderness: There is no abdominal tenderness. There is no guarding or rebound. Hernia: No hernia is present. Musculoskeletal:         General: Normal range of motion. Right lower leg: Tenderness (mild) present. No edema. Left lower leg: Tenderness (mild) present. No edema. Skin:     General: Skin is warm and dry. Neurological:      Mental Status: He is alert and oriented to person, place, and time. Mental status is at baseline. 1. Myositis, unspecified myositis type, unspecified site    - REFERRAL TO NEUROLOGY  - ondansetron (ZOFRAN ODT) 4 mg disintegrating tablet; Take 1 Tablet by mouth every eight (8) hours as needed for Nausea or Vomiting. Dispense: 30 Tablet; Refill: 1    2. Non-traumatic rhabdomyolysis      Has had recurrent history of rhabdomyolysis but states that he immediately went to ED when symptoms began.   CK mildly elevated during this past visit and CMP was essentially normal   Did receive IVF and toradol which helped but does still have some lower leg cramping  Was referred by Dr. Carlos Del Rosario to neurologist at [facility] but was only seen once as they did not have records. Advised to re-schedule and can carry copy of all labs  Denies any use of any alcohol which had been problematic in the past.  Uses the muscle relaxant prn for cramping and continues to hydrate  Work note provided and have advised to avoid heavy lifting or excess activity over next 2-3 days or until cramping resolved. Did inform that he should avoid working > 8-10 hours as his symptoms are often flared when he tries to work excess hours. Informed if develops increased cramping or other worsening condition, follow up immediately or seek emergency care. Patient verbalizes understanding of plan of care as discussed above    Follow-up and Dispositions    · Return in about 3 months (around 7/20/2022) for or sooner for worsening symptoms.

## 2022-05-02 ENCOUNTER — HOSPITAL ENCOUNTER (EMERGENCY)
Age: 37
Discharge: HOME | End: 2022-05-02
Attending: EMERGENCY MEDICINE
Payer: COMMERCIAL

## 2022-05-02 ENCOUNTER — APPOINTMENT (OUTPATIENT)
Dept: CT IMAGING | Age: 37
End: 2022-05-02
Attending: EMERGENCY MEDICINE
Payer: COMMERCIAL

## 2022-05-02 VITALS
HEIGHT: 67 IN | HEART RATE: 97 BPM | SYSTOLIC BLOOD PRESSURE: 106 MMHG | WEIGHT: 210 LBS | RESPIRATION RATE: 19 BRPM | DIASTOLIC BLOOD PRESSURE: 74 MMHG | OXYGEN SATURATION: 99 % | TEMPERATURE: 98 F | BODY MASS INDEX: 32.96 KG/M2

## 2022-05-02 DIAGNOSIS — E86.0 DEHYDRATION: ICD-10-CM

## 2022-05-02 DIAGNOSIS — M62.82 NON-TRAUMATIC RHABDOMYOLYSIS: ICD-10-CM

## 2022-05-02 DIAGNOSIS — M60.9 MYOSITIS, UNSPECIFIED MYOSITIS TYPE, UNSPECIFIED SITE: Primary | ICD-10-CM

## 2022-05-02 DIAGNOSIS — R55 SYNCOPE AND COLLAPSE: Primary | ICD-10-CM

## 2022-05-02 LAB
ALBUMIN SERPL-MCNC: 5.2 G/DL (ref 3.5–5)
ALBUMIN/GLOB SERPL: 1.3 {RATIO} (ref 1.1–2.2)
ALP SERPL-CCNC: 75 U/L (ref 45–117)
ALT SERPL-CCNC: 65 U/L (ref 12–78)
ANION GAP SERPL CALC-SCNC: 14 MMOL/L (ref 5–15)
AST SERPL W P-5'-P-CCNC: 36 U/L (ref 15–37)
BASOPHILS # BLD: 0.1 K/UL (ref 0–0.2)
BASOPHILS NFR BLD: 1 % (ref 0–2.5)
BILIRUB SERPL-MCNC: 0.5 MG/DL (ref 0.2–1)
BUN SERPL-MCNC: 24 MG/DL (ref 6–20)
BUN/CREAT SERPL: 8 (ref 12–20)
CA-I BLD-MCNC: 11.1 MG/DL (ref 8.5–10.1)
CHLORIDE SERPL-SCNC: 98 MMOL/L (ref 97–108)
CK SERPL-CCNC: 472.19 U/L (ref 39–308)
CO2 SERPL-SCNC: 27 MMOL/L (ref 21–32)
CREAT SERPL-MCNC: 3.2 MG/DL (ref 0.7–1.3)
EOSINOPHIL # BLD: 0.1 K/UL (ref 0–0.7)
EOSINOPHIL NFR BLD: 1 % (ref 0.9–2.9)
ERYTHROCYTE [DISTWIDTH] IN BLOOD BY AUTOMATED COUNT: 13.5 % (ref 11.5–14.5)
ETHANOL SERPL-MCNC: <10 MG/DL
GLOBULIN SER CALC-MCNC: 3.9 G/DL (ref 2–4)
GLUCOSE SERPL-MCNC: 91 MG/DL (ref 65–100)
HCT VFR BLD AUTO: 46.2 % (ref 41–53)
HGB BLD-MCNC: 15.4 G/DL (ref 13.5–17.5)
LYMPHOCYTES # BLD: 4.2 K/UL (ref 1–4.8)
LYMPHOCYTES NFR BLD: 31 % (ref 20.5–51.1)
MCH RBC QN AUTO: 28.8 PG (ref 31–34)
MCHC RBC AUTO-ENTMCNC: 33.3 G/DL (ref 31–36)
MCV RBC AUTO: 86.6 FL (ref 80–100)
MONOCYTES # BLD: 1.2 K/UL (ref 0.2–2.4)
MONOCYTES NFR BLD: 9 % (ref 1.7–9.3)
NEUTS SEG # BLD: 8 K/UL (ref 1.8–7.7)
NEUTS SEG NFR BLD: 58 % (ref 42–75)
NRBC # BLD: 0.01 K/UL
NRBC BLD-RTO: 0.1 PER 100 WBC
PLATELET # BLD AUTO: 399 K/UL (ref 150–400)
PMV BLD AUTO: 7.4 FL (ref 6.5–11.5)
POTASSIUM SERPL-SCNC: 3.8 MMOL/L (ref 3.5–5.1)
PROT SERPL-MCNC: 9.1 G/DL (ref 6.4–8.2)
RBC # BLD AUTO: 5.33 M/UL (ref 4.5–5.9)
SODIUM SERPL-SCNC: 139 MMOL/L (ref 136–145)
WBC # BLD AUTO: 13.5 K/UL (ref 4.4–11.3)

## 2022-05-02 PROCEDURE — 70450 CT HEAD/BRAIN W/O DYE: CPT

## 2022-05-02 PROCEDURE — 85025 COMPLETE CBC W/AUTO DIFF WBC: CPT

## 2022-05-02 PROCEDURE — 80053 COMPREHEN METABOLIC PANEL: CPT

## 2022-05-02 PROCEDURE — 96374 THER/PROPH/DIAG INJ IV PUSH: CPT

## 2022-05-02 PROCEDURE — 82550 ASSAY OF CK (CPK): CPT

## 2022-05-02 PROCEDURE — 99284 EMERGENCY DEPT VISIT MOD MDM: CPT

## 2022-05-02 PROCEDURE — 36415 COLL VENOUS BLD VENIPUNCTURE: CPT

## 2022-05-02 PROCEDURE — 82077 ASSAY SPEC XCP UR&BREATH IA: CPT

## 2022-05-02 PROCEDURE — 74011250636 HC RX REV CODE- 250/636: Performed by: EMERGENCY MEDICINE

## 2022-05-02 RX ORDER — ONDANSETRON 2 MG/ML
4 INJECTION INTRAMUSCULAR; INTRAVENOUS
Status: COMPLETED | OUTPATIENT
Start: 2022-05-02 | End: 2022-05-02

## 2022-05-02 RX ADMIN — ONDANSETRON 4 MG: 2 INJECTION INTRAMUSCULAR; INTRAVENOUS at 23:21

## 2022-05-02 RX ADMIN — SODIUM CHLORIDE 500 ML: 9 INJECTION, SOLUTION INTRAVENOUS at 23:21

## 2022-05-02 NOTE — LETTER
5/2/2022 7:55 AM    Mr. Keisha Patel [id number]      Myositis: Diagnosis code M60.9. Treatment plan is rest and fluids.  Also referred to neurologist.  Start date : 4/15/22  End date: 4/23/22        Sincerely,      [facility], NP

## 2022-05-03 ENCOUNTER — TELEPHONE (OUTPATIENT)
Dept: PRIMARY CARE CLINIC | Age: 37
End: 2022-05-03

## 2022-05-03 NOTE — TELEPHONE ENCOUNTER
Pt trying to make an appt with the neurologist they told the pt that records must be sent from the medical provider to this fax number, [phone]- Attention: Brigid Jimenez RN.   This information: All clinical records: diagnosis, blood work, name, birthdate, and urgency(will determine how soon appointment will be made), etc.

## 2022-05-03 NOTE — ED PROVIDER NOTES
Patient presents after he had an episode of syncope and fell hitting his forehead. He had an episode of nausea and vomiting just prior . He has a history of rhabdomyolosis. No fever or chills.               Past Medical History:   Diagnosis Date    Acute gastritis without hemorrhage 4/21/2021    Alcohol abuse 4/21/2021    Anxiety and depression 4/21/2021    Class 1 obesity due to excess calories with serious comorbidity and body mass index (BMI) of 32.0 to 32.9 in adult 4/21/2021    Constipation 4/8/2021    Essential hypertension 4/21/2021    GERD (gastroesophageal reflux disease)     Impingement syndrome of left shoulder 4/21/2021    Irritable bowel syndrome 6/25/2018    Irritable bowel syndrome     Non-traumatic rhabdomyolysis 12/24/2020       Past Surgical History:   Procedure Laterality Date    COLONOSCOPY N/A 5/22/2018    COLONOSCOPY performed by Yaneth Bahena MD at [facility] ENDOSCOPY VISIT-OUTPATIENT  2012    HX OTHER SURGICAL      biopsy taken of left arm infection/         Family History:   Problem Relation Age of Onset    Heart Disease Mother     Diabetes Mother     Hypertension Mother     Liver Disease Father     Cancer Father     Prostate Cancer Father        Social History     Socioeconomic History    Marital status:      Spouse name: Not on file    Number of children: 3    Years of education: Not on file    Highest education level: 10th grade   Occupational History    Not on file   Tobacco Use    Smoking status: Current Every Day Smoker     Years: 20.00     Types: Cigarettes    Smokeless tobacco: Never Used    Tobacco comment: 1 pack every 3 days   Vaping Use    Vaping Use: Former   Substance and Sexual Activity    Alcohol use: Not Currently     Comment: Patient reports he has quit drinking alcohol     Drug use: Not Currently     Types: Cocaine, Marijuana, Prescription     Comment: 3 months ago marijuana    Sexual activity: Yes     Partners: Female Other Topics Concern    Not on file   Social History Narrative    Not on file     Social Determinants of Health     Financial Resource Strain:     Difficulty of Paying Living Expenses: Not on file   Food Insecurity:     Worried About Running Out of Food in the Last Year: Not on file    Kathy of Food in the Last Year: Not on file   Transportation Needs:     Lack of Transportation (Medical): Not on file    Lack of Transportation (Non-Medical): Not on file   Physical Activity:     Days of Exercise per Week: Not on file    Minutes of Exercise per Session: Not on file   Stress:     Feeling of Stress : Not on file   Social Connections:     Frequency of Communication with Friends and Family: Not on file    Frequency of Social Gatherings with Friends and Family: Not on file    Attends Tenriism Services: Not on file    Active Member of [address] [place] [address] or Organizations: Not on file    Attends Club or Organization Meetings: Not on file    Marital Status: Not on file   Intimate Partner Violence:     Fear of Current or Ex-Partner: Not on file    Emotionally Abused: Not on file    Physically Abused: Not on file    Sexually Abused: Not on file   Housing Stability:     Unable to Pay for Housing in the Last Year: Not on file    Number of [place] in the Last Year: Not on file    Unstable Housing in the Last Year: Not on file         ALLERGIES: Cashew nut, Egg derived, Milk, Onion, Peanut, Tomato, and Nsaids (non-steroidal anti-inflammatory drug)    Review of Systems   Constitutional: Positive for fatigue. Negative for chills and fever. HENT: Negative. Eyes: Negative. Respiratory: Negative. Negative for shortness of breath. Cardiovascular: Negative. Negative for chest pain. Gastrointestinal: Positive for nausea and vomiting. Endocrine: Negative. Genitourinary: Negative. Musculoskeletal: Negative. Skin: Negative. Allergic/Immunologic: Negative. Neurological: Positive for syncope. Hematological: Negative. Psychiatric/Behavioral: Negative. Vitals:    05/02/22 2206   BP: 106/74   Pulse: 97   Resp: 19   Temp: 98 °F (36.7 °C)   SpO2: 99%   Weight: 95.3 kg (210 lb)   Height: 5' 7\" (1.702 m)            Physical Exam  Vitals and nursing note reviewed. HENT:      Head: Normocephalic and atraumatic. Comments: Abrasion to forehead. Cardiovascular:      Rate and Rhythm: Normal rate and regular rhythm. Pulses: Normal pulses. Heart sounds: Normal heart sounds. Pulmonary:      Effort: Pulmonary effort is normal.      Breath sounds: Normal breath sounds. Abdominal:      General: Abdomen is flat. Bowel sounds are normal.      Palpations: Abdomen is soft. Musculoskeletal:         General: Normal range of motion. Cervical back: Normal range of motion and neck supple. Skin:     General: Skin is warm and dry. Neurological:      General: No focal deficit present. Mental Status: He is oriented to person, place, and time. Mental status is at baseline.    Psychiatric:         Mood and Affect: Mood normal.         Behavior: Behavior normal.          MDM       Procedures

## 2022-05-03 NOTE — TELEPHONE ENCOUNTER
Yes if they feel his symptoms are not improved after receiving the fluids then I would recommend re-evaluation. The specialist he has seen before recommended that he come up there the next time he had the problem. He will need repeat labs to see how his body responded to the fluid.

## 2022-05-03 NOTE — ED TRIAGE NOTES
Pt states around an hour ago that he was getting out of the car when he started vomiting and then became unconscious. He states that he fell and hit his head on some rocks in the driveway. Pt ambulated into ED with no noted issues, alert and oriented, NAD observed at this time. Pt resp even and unlabored, skin warm and dry.

## 2022-05-03 NOTE — TELEPHONE ENCOUNTER
Pt wife informed me that Last night Joslyn Wing had a serious episode of the same issue. They did blood work and his kidney function was at 3.20 and they only gave him one small bag of fluid and he didn't urinate until this morning but they sent him home last night and told him he was just a little dehydrated. We know from the past if this isn't handled well it will happen again quickly. Pt wife  is asking if you could look at the     blood work from last night and see if we should take him somewhere else?

## 2022-05-08 DIAGNOSIS — I10 ESSENTIAL HYPERTENSION: ICD-10-CM

## 2022-05-08 DIAGNOSIS — Z88.9 H/O SEASONAL ALLERGIES: ICD-10-CM

## 2022-05-08 DIAGNOSIS — K21.9 GASTROESOPHAGEAL REFLUX DISEASE WITHOUT ESOPHAGITIS: ICD-10-CM

## 2022-05-09 RX ORDER — AMLODIPINE BESYLATE 2.5 MG/1
2.5 TABLET ORAL DAILY
Qty: 90 TABLET | Refills: 0 | Status: SHIPPED | OUTPATIENT
Start: 2022-05-09 | End: 2022-08-26

## 2022-05-09 RX ORDER — HYDROXYZINE 25 MG/1
25 TABLET, FILM COATED ORAL
Qty: 120 TABLET | Refills: 0 | Status: SHIPPED | OUTPATIENT
Start: 2022-05-09 | End: 2022-07-18 | Stop reason: SDUPTHER

## 2022-05-09 RX ORDER — MONTELUKAST SODIUM 10 MG/1
10 TABLET ORAL DAILY
Qty: 30 TABLET | Refills: 0 | Status: SHIPPED | OUTPATIENT
Start: 2022-05-09 | End: 2022-06-22 | Stop reason: SDUPTHER

## 2022-05-09 RX ORDER — ESOMEPRAZOLE MAGNESIUM 20 MG/1
20 FOR SUSPENSION ORAL DAILY
Qty: 90 PACKET | Refills: 0 | Status: SHIPPED | OUTPATIENT
Start: 2022-05-09 | End: 2022-08-07

## 2022-05-18 ENCOUNTER — TELEPHONE (OUTPATIENT)
Dept: PRIMARY CARE CLINIC | Age: 37
End: 2022-05-18

## 2022-05-18 ENCOUNTER — NURSE TRIAGE (OUTPATIENT)
Dept: OTHER | Facility: CLINIC | Age: 37
End: 2022-05-18

## 2022-05-18 NOTE — TELEPHONE ENCOUNTER
Pt wife states they have not heard from the neurology -told her to call them back again to schedule.  Pt is sob, tired, overall weakness, having a lot of the beginning symptoms- they would rather not back back to the ER until they speak with Tammi Vargas

## 2022-05-18 NOTE — TELEPHONE ENCOUNTER
I read referral notes and looks like they said they had to ask someone before scheduling. Please let them know that the patient has already seen this doctor before I just sent referral since new year. They need to schedule him asap as he was instructed by the provider to see them when he started having problems again.

## 2022-05-18 NOTE — TELEPHONE ENCOUNTER
Received call from [place] at [facility] with Red Flag Complaint. Subjective: Caller(wife) states pt. seen in  ER 5/2/22 and 5/3/22. Was advised to F/U with neuro specialist that he has seen in the past but needs new referral and has not heard from Neuro office for appointment. Not feeling well past few days, nausea and generalized fatigue. Current Symptoms: Can't move without getting overly tired,nausea,mild diarrhea,states difficulty breathing when asked. Onset: 3 days ago    Associated Symptoms: NA    Pain Severity: Body sore,does not rate    Temperature:Denies    What has been tried:Drinking water       Recommended disposition: Go to ER Now  Care advice provided, patient verbalizes understanding; denies any other questions or concerns; instructed to call back for any new or worsening symptoms. Writer provided warm transfer to [place] at [facility] for Refusal of ER dispo and to F/U on referral status    Attention Provider: Thank you for allowing me to participate in the care of your patient. The patient was connected to triage in response to information provided to the [facility]. Please do not respond through this encounter as the response is not directed to a shared pool.     Reason for Disposition   Difficulty breathing    Protocols used: WEAKNESS (GENERALIZED) AND FATIGUE-ADULT-[place]

## 2022-06-23 RX ORDER — MONTELUKAST SODIUM 10 MG/1
10 TABLET ORAL DAILY
Qty: 90 TABLET | Refills: 0 | Status: SHIPPED | OUTPATIENT
Start: 2022-06-23

## 2022-07-18 ENCOUNTER — PATIENT MESSAGE (OUTPATIENT)
Dept: PRIMARY CARE CLINIC | Age: 37
End: 2022-07-18

## 2022-07-18 DIAGNOSIS — Z88.9 H/O SEASONAL ALLERGIES: ICD-10-CM

## 2022-07-18 RX ORDER — MINERAL OIL
180 ENEMA (ML) RECTAL DAILY
Qty: 90 TABLET | Refills: 1 | Status: SHIPPED | OUTPATIENT
Start: 2022-07-18

## 2022-07-18 RX ORDER — HYDROXYZINE 25 MG/1
25 TABLET, FILM COATED ORAL
Qty: 120 TABLET | Refills: 1 | Status: SHIPPED | OUTPATIENT
Start: 2022-07-18

## 2022-08-09 NOTE — INTERVAL H&P NOTE
Spoke to the pt per Dr. Still's request to discuss with him the oral chemotherapy schedule. He is aware that his Xeloda will arrive to him in the next day or two. Instructed him that Dr. Still wants him to start it on Thursday 8/11; pt stated understanding. He went on to say \"I know how to do this. I'll richardson it on the calendar and take it for 14 days and then have 7 days off\". Confirmed with him that this is correct. Reminded him of his appt here for labs 8/30 and his OV with Dr. Still on 9/22; pt verbalized understanding.    Update History & Physical 
 
The Patient's History and Physical of April 20, 2021 was reviewed with the patient and I examined the patient. There was no change. The surgical site was confirmed by the patient and me. Plan:  The risk, benefits, expected outcome, and alternative to the recommended procedure have been discussed with the patient. Patient understands and wants to proceed with the procedure.  
 
Electronically signed by Sanjuanita Mojica MD on 4/20/2021 at 10:47 AM

## 2022-08-26 DIAGNOSIS — I10 ESSENTIAL HYPERTENSION: ICD-10-CM

## 2022-08-26 RX ORDER — AMLODIPINE BESYLATE 2.5 MG/1
2.5 TABLET ORAL DAILY
Qty: 90 TABLET | Refills: 1 | Status: SHIPPED | OUTPATIENT
Start: 2022-08-26

## 2022-08-26 RX ORDER — AMLODIPINE BESYLATE 2.5 MG/1
TABLET ORAL
Qty: 30 TABLET | Refills: 0 | Status: SHIPPED | OUTPATIENT
Start: 2022-08-26

## 2022-09-18 RX ORDER — HYOSCYAMINE SULFATE 0.38 MG/1
TABLET, EXTENDED RELEASE ORAL
Qty: 60 TABLET | Refills: 0 | Status: SHIPPED | OUTPATIENT
Start: 2022-09-18

## 2022-10-27 ENCOUNTER — PATIENT MESSAGE (OUTPATIENT)
Dept: PRIMARY CARE CLINIC | Age: 37
End: 2022-10-27

## 2022-12-28 ENCOUNTER — OFFICE VISIT (OUTPATIENT)
Dept: PRIMARY CARE CLINIC | Age: 37
End: 2022-12-28
Payer: COMMERCIAL

## 2022-12-28 VITALS
RESPIRATION RATE: 18 BRPM | HEART RATE: 94 BPM | DIASTOLIC BLOOD PRESSURE: 77 MMHG | BODY MASS INDEX: 34.84 KG/M2 | HEIGHT: 67 IN | TEMPERATURE: 98.1 F | SYSTOLIC BLOOD PRESSURE: 121 MMHG | WEIGHT: 222 LBS | OXYGEN SATURATION: 95 %

## 2022-12-28 DIAGNOSIS — K21.9 GASTROESOPHAGEAL REFLUX DISEASE WITHOUT ESOPHAGITIS: ICD-10-CM

## 2022-12-28 DIAGNOSIS — R11.0 NAUSEA: ICD-10-CM

## 2022-12-28 DIAGNOSIS — F32.A ANXIETY AND DEPRESSION: ICD-10-CM

## 2022-12-28 DIAGNOSIS — Z88.9 H/O SEASONAL ALLERGIES: Primary | ICD-10-CM

## 2022-12-28 DIAGNOSIS — F41.9 ANXIETY AND DEPRESSION: ICD-10-CM

## 2022-12-28 PROCEDURE — 3078F DIAST BP <80 MM HG: CPT

## 2022-12-28 PROCEDURE — 99214 OFFICE O/P EST MOD 30 MIN: CPT

## 2022-12-28 PROCEDURE — 3074F SYST BP LT 130 MM HG: CPT

## 2022-12-28 RX ORDER — CARBAMAZEPINE 100 MG/1
150 TABLET, CHEWABLE ORAL 2 TIMES DAILY
COMMUNITY
Start: 2022-08-03 | End: 2023-08-03

## 2022-12-28 RX ORDER — ONDANSETRON 4 MG/1
4 TABLET, ORALLY DISINTEGRATING ORAL
Qty: 30 TABLET | Refills: 1 | Status: SHIPPED | OUTPATIENT
Start: 2022-12-28

## 2022-12-28 RX ORDER — BUPROPION HYDROCHLORIDE 150 MG/1
150 TABLET, EXTENDED RELEASE ORAL 2 TIMES DAILY
COMMUNITY
Start: 2022-05-03

## 2022-12-28 RX ORDER — MONTELUKAST SODIUM 10 MG/1
10 TABLET ORAL DAILY
Qty: 90 TABLET | Refills: 1 | Status: SHIPPED | OUTPATIENT
Start: 2022-12-28

## 2022-12-28 RX ORDER — ESOMEPRAZOLE MAGNESIUM 40 MG/1
40 CAPSULE, DELAYED RELEASE ORAL DAILY
Qty: 90 CAPSULE | Refills: 1 | Status: SHIPPED | OUTPATIENT
Start: 2022-12-28

## 2022-12-28 NOTE — PROGRESS NOTES
Shell Morales Sr. is a 40 y.o. male who presents to the office today for the following:    Chief Complaint   Patient presents with    Abdominal Pain     C/O abd pain since last week. Was taking Nexium and then pharmacy states he could not get them. Taking OTC meds, but not working.         Past Medical History:   Diagnosis Date    Acute gastritis without hemorrhage 4/21/2021    Alcohol abuse 4/21/2021    Anxiety and depression 4/21/2021    Class 1 obesity due to excess calories with serious comorbidity and body mass index (BMI) of 32.0 to 32.9 in adult 4/21/2021    Constipation 4/8/2021    Essential hypertension 4/21/2021    GERD (gastroesophageal reflux disease)     Impingement syndrome of left shoulder 4/21/2021    Irritable bowel syndrome 6/25/2018    Irritable bowel syndrome     Non-traumatic rhabdomyolysis 12/24/2020       Past Surgical History:   Procedure Laterality Date    COLONOSCOPY N/A 05/22/2018    COLONOSCOPY performed by Charlie Medrano MD at [address] [place] [address] 635 VISIT-OUTPATIENT  2012    HX OTHER SURGICAL      biopsy taken of left arm infection/    HX WISDOM TEETH EXTRACTION  06/13/2022        Family History   Problem Relation Age of Onset    Heart Disease Mother     Diabetes Mother     Hypertension Mother     Liver Disease Father     Cancer Father     Prostate Cancer Father         Social History     Tobacco Use    Smoking status: Every Day     Years: 20.00     Types: Cigarettes    Smokeless tobacco: Never    Tobacco comments:     1 pack every 3 days   Vaping Use    Vaping Use: Every day    Substances: Nicotine   Substance Use Topics    Alcohol use: Not Currently     Comment: Patient reports he has quit drinking alcohol     Drug use: Not Currently     Types: Cocaine, Marijuana, Prescription     Comment: 3 months ago marijuana        HPI  Patient here for medication refills and GERD with PMH chronic constipation, IBS, rhabdomyolysis, anxiety, depression, hypertension, ETOH abuse, obesity, and KRISTEN. Patient follows with neurology, nephrology, and GI. Next neuro follow up April 2023 and Nephrology November 2023. Last seen by Nephrology on 11/10/2022 and cleared for one year follow up. Labs drawn at that time reflect CREA 0.98. Patient states that his last endoscopy was in April 2021 with Dr. Era Britt. States that he has been out of his nexium for several months and is having daily epigastric burning that worsens during hours of sleep. Denies chest pain, shortness of breath, dizziness, lightheaded, near syncope. Patient states burning is similar to previous GERD exacerbations. Patient also requesting referral to allergist. States that he was previously taking allergy injections that appeared to be working, however he stopped going for undisclosed reason. Reports chronic rhinitis, sneezing and congestion since stopping injections. Current Outpatient Medications on File Prior to Visit   Medication Sig    buPROPion SR (WELLBUTRIN SR) 150 mg SR tablet Take 150 mg by mouth two (2) times a day. carBAMazepine (TEGretol) 100 mg chewable tablet Take 150 mg by mouth two (2) times a day. hyoscyamine SR (LEVBID) 0.375 mg SR tablet Take 1 Tablet by mouth every twelve (12) hours as needed for Cramping. amLODIPine (NORVASC) 2.5 mg tablet Take 1 Tablet by mouth daily. fexofenadine (ALLEGRA) 180 mg tablet Take 1 Tablet by mouth daily. hydrOXYzine HCL (ATARAX) 25 mg tablet Take 1 Tablet by mouth every six (6) hours as needed for Itching. cyclobenzaprine (FLEXERIL) 10 mg tablet Take 1 Tablet by mouth three (3) times daily as needed for Muscle Spasm(s). fluticasone propionate (FLONASE) 50 mcg/actuation nasal spray 2 Sprays by Both Nostrils route daily. [DISCONTINUED] amLODIPine (NORVASC) 2.5 mg tablet Take 1 Tablet by mouth daily. [DISCONTINUED] montelukast (SINGULAIR) 10 mg tablet Take 1 Tablet by mouth daily.     [DISCONTINUED] ondansetron (ZOFRAN ODT) 4 mg disintegrating tablet Take 1 Tablet by mouth every eight (8) hours as needed for Nausea or Vomiting. ibuprofen (MOTRIN) 600 mg tablet Take 1 Tablet by mouth every six (6) hours as needed for Pain. (Patient not taking: Reported on 12/28/2022)     No current facility-administered medications on file prior to visit. Medications Ordered Today   Medications    montelukast (SINGULAIR) 10 mg tablet     Sig: Take 1 Tablet by mouth daily. Dispense:  90 Tablet     Refill:  1    esomeprazole (NEXIUM) 40 mg capsule     Sig: Take 1 Capsule by mouth daily. Dispense:  90 Capsule     Refill:  1    ondansetron (ZOFRAN ODT) 4 mg disintegrating tablet     Sig: Take 1 Tablet by mouth every eight (8) hours as needed for Nausea or Vomiting. Dispense:  30 Tablet     Refill:  1        Review of Systems   Constitutional:  Negative for chills, fever and malaise/fatigue. HENT:  Positive for congestion. Negative for sinus pain and sore throat. Eyes:  Negative for blurred vision and double vision. Respiratory:  Negative for cough and shortness of breath. Cardiovascular:  Negative for chest pain and palpitations. Gastrointestinal:  Positive for heartburn and nausea. Negative for abdominal pain and vomiting. Genitourinary:  Negative for frequency, hematuria and urgency. Musculoskeletal:  Negative for falls and myalgias. Neurological:  Negative for dizziness and headaches. Psychiatric/Behavioral:  Negative for depression, hallucinations, memory loss, substance abuse and suicidal ideas. The patient is not nervous/anxious and does not have insomnia. Visit Vitals  /77 (BP 1 Location: Right arm, BP Patient Position: Sitting, BP Cuff Size: Adult)   Pulse 94   Temp 98.1 °F (36.7 °C) (Oral)   Resp 18   Ht 5' 7\" (1.702 m)   Wt 222 lb (100.7 kg)   SpO2 95%   BMI 34.77 kg/m²       Last Point of Care HGB A1C  No results found for: [id number]      Physical Exam  Constitutional:       Appearance: Normal appearance. He is obese.    HENT: Head: Normocephalic and atraumatic. Right Ear: Tympanic membrane, ear canal and external ear normal.      Left Ear: Tympanic membrane, ear canal and external ear normal.      Nose: Rhinorrhea present. No congestion. Mouth/Throat:      Mouth: Mucous membranes are moist.      Pharynx: Oropharyngeal exudate present. No posterior oropharyngeal erythema. Eyes:      Extraocular Movements: Extraocular movements intact. Conjunctiva/sclera: Conjunctivae normal.      Pupils: Pupils are equal, round, and reactive to light. Cardiovascular:      Rate and Rhythm: Normal rate and regular rhythm. Heart sounds: No murmur heard. No friction rub. No gallop. Pulmonary:      Effort: Pulmonary effort is normal.      Breath sounds: Normal breath sounds. Abdominal:      General: Abdomen is flat. Bowel sounds are normal.      Palpations: Abdomen is soft. Musculoskeletal:         General: Normal range of motion. Cervical back: Normal range of motion. Skin:     General: Skin is warm and dry. Neurological:      General: No focal deficit present. Mental Status: He is alert and oriented to person, place, and time. Psychiatric:         Mood and Affect: Mood normal.         Behavior: Behavior normal.        1. H/O seasonal allergies  -     montelukast (SINGULAIR) 10 mg tablet; Take 1 Tablet by mouth daily. , Normal, Disp-90 Tablet, R-1  -     REFERRAL TO ALLERGY  Refill sent for singulair. Take as directed. Will send referral for allergy specialist.    2. Gastroesophageal reflux disease without esophagitis  -     esomeprazole (NEXIUM) 40 mg capsule; Take 1 Capsule by mouth daily. , Normal, Disp-90 Capsule, R-1  Will refill nexium. Take as directed. Follows with Dr. Tricia Rutledge for IBS, GERD, Chronic Constipation. 3. Anxiety and depression  This is stable. Take medications are directed. [facility] contact information recommended and declined.      4. Nausea  -     ondansetron (ZOFRAN ODT) 4 mg disintegrating tablet; Take 1 Tablet by mouth every eight (8) hours as needed for Nausea or Vomiting., Normal, Disp-30 Tablet, R-1  Will send zofran odt for prn nausea during acute episode of GERD. Recommended 2 week follow up if not resolved with use of Nexium daily. Patient is due for annual wellness. Recommended patient make appointment for wellness visit. Last labs located in Nephrology note. CREA stable at 0.98. We discussed the expected course, resolution and complications of the diagnosis(es) in detail. Medication risks, benefits, costs, interactions, and alternatives were discussed as indicated. I advised her to contact the office if her condition worsens, changes or fails to improve as anticipated. She expressed understanding with the diagnosis(es) and plan. Patient verbalizes understanding of plan of care as discussed above    Follow-up and Dispositions    Return in about 2 weeks (around 1/11/2023), or if symptoms worsen or fail to improve.

## 2022-12-28 NOTE — PROGRESS NOTES
Chief Complaint   Patient presents with    Abdominal Pain     C/O abd pain since last week. Was taking Nexium and then pharmacy states he could not get them. Taking OTC meds, but not working. 1. Have you been to the ER, urgent care clinic since your last visit? Hospitalized since your last visit? No    2. Have you seen or consulted any other health care providers outside of the [address] [place] [address] since your last visit? Include any pap smears or colon screening.  No  Visit Vitals  /77 (BP 1 Location: Right arm, BP Patient Position: Sitting, BP Cuff Size: Adult)   Pulse 94   Temp 98.1 °F (36.7 °C) (Oral)   Resp 18   Ht 5' 7\" (1.702 m)   Wt 222 lb (100.7 kg)   SpO2 95%   BMI 34.77 kg/m²

## 2023-01-19 ENCOUNTER — TRANSCRIBE ORDER (OUTPATIENT)
Dept: REGISTRATION | Age: 38
End: 2023-01-19

## 2023-01-19 ENCOUNTER — HOSPITAL ENCOUNTER (OUTPATIENT)
Dept: LAB | Age: 38
Discharge: HOME | End: 2023-01-19
Payer: COMMERCIAL

## 2023-01-19 DIAGNOSIS — J30.89 NON-SEASONAL ALLERGIC RHINITIS, UNSPECIFIED TRIGGER: ICD-10-CM

## 2023-01-19 DIAGNOSIS — Z91.018 ALLERGY TO OTHER FOODS: ICD-10-CM

## 2023-01-19 DIAGNOSIS — Z91.018 ALLERGY TO OTHER FOODS: Primary | ICD-10-CM

## 2023-01-19 PROCEDURE — 86003 ALLG SPEC IGE CRUDE XTRC EA: CPT

## 2023-01-19 PROCEDURE — 36415 COLL VENOUS BLD VENIPUNCTURE: CPT

## 2023-01-19 PROCEDURE — 82785 ASSAY OF IGE: CPT

## 2023-01-22 LAB
A ALTERNATA IGE QN: <0.1 KU/L
A FUMIGATUS IGE QN: <0.1 KU/L
BERMUDA GRASS IGE QN: <0.1 KU/L
BOXELDER IGE QN: <0.1 KU/L
C HERBARUM IGE QN: <0.1 KU/L
CAT DANDER IGG QN: <0.1 KU/L
CLASS DESCRIPTION, 600268: ABNORMAL
COMMON RAGWEED IGE QN: <0.1 KU/L
COTTONWOOD IGE QN: <0.1 KU/L
D FARINAE IGE QN: <0.1 KU/L
D PTERONYSS IGE QN: <0.1 KU/L
DEPRECATED IGE QN: <0.1 KU/L
DOG DANDER IGE QN: <0.1 KU/L
IGE SERPL-ACNC: 49 IU/ML (ref 6–495)
IGE SERPL-ACNC: 52 IU/ML (ref 6–495)
JOHNSON GRASS IGE QN: <0.1 KU/L
MOUSE URINE PROT IGE QN: <0.1 KU/L
MT JUNIPER IGE QN: <0.1 KU/L
P NOTATUM IGE QN: <0.1 KU/L
PECAN/HICK TREE IGE QN: <0.1 KU/L
ROACH IGG QN: 0.28 KU/L
SHEEP SORREL IGE QN: <0.1 KU/L
TIMOTHY IGE QN: <0.1 KU/L
WHITE BIRCH IGE QN: <0.1 KU/L
WHITE ELM IGG QN: <0.1 KU/L
WHITE MULBERRY IGE QN: <0.1 KU/L
WHITE OAK IGE QN: <0.1 KU/L

## 2023-02-20 RX ORDER — HYOSCYAMINE SULFATE 0.38 MG/1
TABLET, EXTENDED RELEASE ORAL
Qty: 60 TABLET | Refills: 0 | Status: SHIPPED | OUTPATIENT
Start: 2023-02-20

## 2023-02-23 DIAGNOSIS — F41.9 ANXIETY AND DEPRESSION: ICD-10-CM

## 2023-02-23 DIAGNOSIS — F32.A ANXIETY AND DEPRESSION: ICD-10-CM

## 2023-02-23 RX ORDER — BUPROPION HYDROCHLORIDE 150 MG/1
150 TABLET, EXTENDED RELEASE ORAL 2 TIMES DAILY
Qty: 180 TABLET | Refills: 0 | Status: SHIPPED | OUTPATIENT
Start: 2023-02-23

## 2023-03-01 ENCOUNTER — OFFICE VISIT (OUTPATIENT)
Dept: PRIMARY CARE CLINIC | Age: 38
End: 2023-03-01
Payer: MEDICAID

## 2023-03-01 VITALS
DIASTOLIC BLOOD PRESSURE: 105 MMHG | HEART RATE: 91 BPM | RESPIRATION RATE: 18 BRPM | TEMPERATURE: 98.3 F | OXYGEN SATURATION: 96 % | BODY MASS INDEX: 34.91 KG/M2 | WEIGHT: 222.4 LBS | HEIGHT: 67 IN | SYSTOLIC BLOOD PRESSURE: 153 MMHG

## 2023-03-01 DIAGNOSIS — I10 ESSENTIAL HYPERTENSION: ICD-10-CM

## 2023-03-01 DIAGNOSIS — Z11.52 ENCOUNTER FOR SCREENING FOR COVID-19: ICD-10-CM

## 2023-03-01 DIAGNOSIS — R53.83 FATIGUE, UNSPECIFIED TYPE: ICD-10-CM

## 2023-03-01 DIAGNOSIS — M62.82 NON-TRAUMATIC RHABDOMYOLYSIS: Primary | ICD-10-CM

## 2023-03-01 DIAGNOSIS — Z88.9 H/O SEASONAL ALLERGIES: ICD-10-CM

## 2023-03-01 DIAGNOSIS — E55.9 VITAMIN D DEFICIENCY: ICD-10-CM

## 2023-03-01 PROBLEM — G71.19: Status: ACTIVE | Noted: 2022-11-16

## 2023-03-01 PROCEDURE — 99213 OFFICE O/P EST LOW 20 MIN: CPT

## 2023-03-01 PROCEDURE — 3080F DIAST BP >= 90 MM HG: CPT

## 2023-03-01 PROCEDURE — 3077F SYST BP >= 140 MM HG: CPT

## 2023-03-01 RX ORDER — AMLODIPINE BESYLATE 2.5 MG/1
2.5 TABLET ORAL DAILY
Qty: 90 TABLET | Refills: 1 | Status: SHIPPED | OUTPATIENT
Start: 2023-03-01

## 2023-03-01 RX ORDER — FLUTICASONE PROPIONATE 50 MCG
2 SPRAY, SUSPENSION (ML) NASAL DAILY
Qty: 16 G | Refills: 5 | Status: SHIPPED | OUTPATIENT
Start: 2023-03-01

## 2023-03-01 NOTE — PROGRESS NOTES
Bernardino Ellington Sr. is a 40 y.o. male who presents to the office today for the following:    Chief Complaint   Patient presents with    Fatigue     C/o fatigue, night sweats, weakness, body aches mainly in legs x 1 week        Past Medical History:   Diagnosis Date    Acute gastritis without hemorrhage 4/21/2021    Alcohol abuse 4/21/2021    Anxiety and depression 4/21/2021    Class 1 obesity due to excess calories with serious comorbidity and body mass index (BMI) of 32.0 to 32.9 in adult 4/21/2021    Constipation 4/8/2021    Essential hypertension 4/21/2021    GERD (gastroesophageal reflux disease)     Impingement syndrome of left shoulder 4/21/2021    Irritable bowel syndrome 6/25/2018    Irritable bowel syndrome     Non-traumatic rhabdomyolysis 12/24/2020       Past Surgical History:   Procedure Laterality Date    COLONOSCOPY N/A 05/22/2018    COLONOSCOPY performed by Maddi Valera MD at [address] [place] [address] 63 VISIT-OUTPATIENT  2012    HX OTHER SURGICAL      biopsy taken of left arm infection/    HX WISDOM TEETH EXTRACTION  06/13/2022        Family History   Problem Relation Age of Onset    Heart Disease Mother     Diabetes Mother     Hypertension Mother     Liver Disease Father     Cancer Father     Prostate Cancer Father         Social History     Tobacco Use    Smoking status: Every Day     Years: 20.00     Types: Cigarettes    Smokeless tobacco: Never    Tobacco comments:     1 pack every 3 days   Vaping Use    Vaping Use: Every day    Substances: Nicotine   Substance Use Topics    Alcohol use: Not Currently     Comment: Patient reports he has quit drinking alcohol     Drug use: Not Currently     Types: Cocaine, Marijuana, Prescription     Comment: 3 months ago marijuana        HPI  Patient here night sweats, lower extremity cramping, generalized weakness, fatigue, and dark urine for 5 days with PMH, rhabdomyolysis, IBS, GERD, anxiety, depression, obesity, ETOH abuse, and HTN.  Patient states he has not taken BP medication in approx 1 month due to \"bp was well controlled when on medication so I thought I could come off of it\". Patient is not checking BP since cessation of medication. Follows with [facility], Nephrology, [facility], Neurology, and [facility], Allergy Specialist. Endorses 1.5-2L water per day. Patient denies sick contacts in home, however would like covid test. Patient denies chest pain, shortness of breath, cough, nasal congestion, headache, runny nose, sore throat, fever, or generalized body aches and chills. Patient states he needs new refill for Flonase and Amlodipine. Current Outpatient Medications on File Prior to Visit   Medication Sig    buPROPion SR (WELLBUTRIN SR) 150 mg SR tablet Take 1 Tablet by mouth two (2) times a day. hyoscyamine SR (LEVBID) 0.375 mg SR tablet TAKE 1 TABLET BY MOUTH EVERY 12 HOURS AS NEEDED FOR CRAMPS    carBAMazepine (TEGretol) 100 mg chewable tablet Take 150 mg by mouth two (2) times a day. montelukast (SINGULAIR) 10 mg tablet Take 1 Tablet by mouth daily. esomeprazole (NEXIUM) 40 mg capsule Take 1 Capsule by mouth daily. fexofenadine (ALLEGRA) 180 mg tablet Take 1 Tablet by mouth daily. hydrOXYzine HCL (ATARAX) 25 mg tablet Take 1 Tablet by mouth every six (6) hours as needed for Itching. [DISCONTINUED] fluticasone propionate (FLONASE) 50 mcg/actuation nasal spray 2 Sprays by Both Nostrils route daily. [DISCONTINUED] ondansetron (ZOFRAN ODT) 4 mg disintegrating tablet Take 1 Tablet by mouth every eight (8) hours as needed for Nausea or Vomiting. (Patient not taking: Reported on 3/1/2023)    [DISCONTINUED] amLODIPine (NORVASC) 2.5 mg tablet Take 1 Tablet by mouth daily. [DISCONTINUED] cyclobenzaprine (FLEXERIL) 10 mg tablet Take 1 Tablet by mouth three (3) times daily as needed for Muscle Spasm(s).  (Patient not taking: Reported on 3/1/2023)    [DISCONTINUED] ibuprofen (MOTRIN) 600 mg tablet Take 1 Tablet by mouth every six (6) hours as needed for Pain. (Patient not taking: No sig reported)     No current facility-administered medications on file prior to visit. Medications Ordered Today   Medications    amLODIPine (NORVASC) 2.5 mg tablet     Sig: Take 1 Tablet by mouth daily. Dispense:  90 Tablet     Refill:  1    fluticasone propionate (FLONASE) 50 mcg/actuation nasal spray     Si Sprays by Both Nostrils route daily. Dispense:  16 g     Refill:  5        Review of Systems   Constitutional:  Positive for malaise/fatigue. Negative for chills, diaphoresis, fever and weight loss. HENT:  Negative for congestion, ear discharge, ear pain, hearing loss, nosebleeds, sinus pain, sore throat and tinnitus. Eyes:  Negative for blurred vision, double vision, photophobia, pain, discharge and redness. Respiratory:  Negative for cough, hemoptysis, sputum production, shortness of breath, wheezing and stridor. Cardiovascular:  Negative for chest pain, palpitations, orthopnea, claudication, leg swelling and PND. Gastrointestinal:  Negative for abdominal pain, blood in stool, diarrhea, heartburn, melena, nausea and vomiting. Genitourinary:  Negative for dysuria, flank pain, frequency, hematuria and urgency. Dark urine   Musculoskeletal:  Positive for myalgias. Negative for back pain, falls, joint pain and neck pain. BLE cramping   Neurological:  Positive for weakness. Negative for dizziness, tingling, tremors, sensory change, speech change, focal weakness, seizures, loss of consciousness and headaches. Psychiatric/Behavioral:  Negative for depression, hallucinations, memory loss, substance abuse and suicidal ideas. The patient is not nervous/anxious and does not have insomnia.       Visit Vitals  BP (!) 153/105 (BP 1 Location: Left arm, BP Patient Position: Sitting, BP Cuff Size: Adult) Comment: Not taking B/P meds   Pulse 91   Temp 98.3 °F (36.8 °C) (Oral)   Resp 18   Ht 5' 7\" (1.702 m)   Wt 222 lb 6.4 oz (100.9 kg)   SpO2 96%   BMI 34.83 kg/m²       Last Point of Care HGB A1C  No results found for: [id number]      Physical Exam  Constitutional:       Appearance: Normal appearance. He is obese. HENT:      Head: Normocephalic and atraumatic. Right Ear: Tympanic membrane, ear canal and external ear normal.      Left Ear: Tympanic membrane, ear canal and external ear normal.      Nose: Nose normal.      Mouth/Throat:      Mouth: Mucous membranes are moist.   Eyes:      Extraocular Movements: Extraocular movements intact. Conjunctiva/sclera: Conjunctivae normal.      Pupils: Pupils are equal, round, and reactive to light. Cardiovascular:      Rate and Rhythm: Normal rate and regular rhythm. Heart sounds: No murmur heard. No friction rub. No gallop. Pulmonary:      Effort: Pulmonary effort is normal.      Breath sounds: Normal breath sounds. Abdominal:      General: Abdomen is flat. Bowel sounds are normal.      Palpations: Abdomen is soft. Genitourinary:     Comments: Dark urinary output   Musculoskeletal:         General: Normal range of motion. Cervical back: Normal range of motion. Skin:     General: Skin is warm and dry. Neurological:      General: No focal deficit present. Mental Status: He is alert and oriented to person, place, and time. Psychiatric:         Mood and Affect: Mood normal.         Behavior: Behavior normal.         Thought Content: Thought content normal.         Judgment: Judgment normal.        1. Non-traumatic rhabdomyolysis  -     URINALYSIS W/RFLX MICROSCOPIC  -     CBC WITH AUTOMATED DIFF  -     METABOLIC PANEL, COMPREHENSIVE  -     MYOGLOBIN  Will check labs now. Urine today is dark and with patients' history of recurring rhabdomyolysis, this is a concern. Recommended fluid re-hydration. Patient endorses PO intake of 1.5-2L water daily. Keep follow up with Dr. Verenice Farr, Nephrology. 2. Essential hypertension  -     amLODIPine (NORVASC) 2.5 mg tablet;  Take 1 Tablet by mouth daily. , Normal, Disp-90 Tablet, R-1  BP is not at goal of < 140/90 today. Will refill amlodipine now. Recommended patient check BP at home and bring readings to next visit for review. Recommended patient return in 1-2 weeks for BP check as NV. Educated patient to take amlodipine as directed, if concerns present to stop medication, asked that patient discuss with provider first. Patient is agreeable to this. 3. H/O seasonal allergies  -     fluticasone propionate (FLONASE) 50 mcg/actuation nasal spray; 2 Sprays by Both Nostrils route daily. , Normal, Disp-16 g, R-5  Will refill Flonase now. Patient follows with Dr. Ger Rust for this. 4. Fatigue, unspecified type  -     VITAMIN D, 25 HYDROXY  -     VITAMIN B12 & FOLATE  -     TSH RFX ON ABNORMAL TO FREE T4  Will check labs now. 5. Encounter for screening for COVID-19  -     NOVEL CORONAVIRUS (COVID-19)  Will send covid screen at patient request now, although I suspect this diagnosis is less likely. We discussed the expected course, resolution and complications of the diagnosis(es) in detail. Medication risks, benefits, costs, interactions, and alternatives were discussed as indicated. I advised her to contact the office if her condition worsens, changes or fails to improve as anticipated. She expressed understanding with the diagnosis(es) and plan. Patient verbalizes understanding of plan of care as discussed above    Follow-up and Dispositions    Return in about 1 week (around 3/8/2023), or if symptoms worsen or fail to improve, for NV 1 week BP check.

## 2023-03-01 NOTE — PROGRESS NOTES
Chief Complaint   Patient presents with    Fatigue     C/o fatigue, night sweats, weakness, body aches mainly in legs x 1 week      1. \"Have you been to the ER, urgent care clinic since your last visit? Hospitalized since your last visit? \" No    2. \"Have you seen or consulted any other health care providers outside of the [address] [place] [address] since your last visit? \" No     3. For patients aged 39-70: Has the patient had a colonoscopy / FIT/ Cologuard? Yes - no Care Gap present      If the patient is female:    4. For patients aged 41-77: Has the patient had a mammogram within the past 2 years? NA - based on age or sex      11. For patients aged 21-65: Has the patient had a pap smear? NA - based on age or sex. vs  Visit Vitals  BP (!) 153/105 (BP 1 Location: Left arm, BP Patient Position: Sitting, BP Cuff Size: Adult) Comment: Not taking B/P meds   Pulse 91   Temp 98.3 °F (36.8 °C) (Oral)   Resp 18   Ht 5' 7\" (1.702 m)   Wt 222 lb 6.4 oz (100.9 kg)   SpO2 96%   BMI 34.83 kg/m²

## 2023-03-02 LAB
25(OH)D3+25(OH)D2 SERPL-MCNC: 11.7 NG/ML (ref 30–100)
ALBUMIN SERPL-MCNC: 5.1 G/DL (ref 4–5)
ALBUMIN/GLOB SERPL: 1.9 {RATIO} (ref 1.2–2.2)
ALP SERPL-CCNC: 69 IU/L (ref 44–121)
ALT SERPL-CCNC: 27 IU/L (ref 0–44)
AST SERPL-CCNC: 25 IU/L (ref 0–40)
BASOPHILS # BLD AUTO: 0.1 X10E3/UL (ref 0–0.2)
BASOPHILS NFR BLD AUTO: 1 %
BILIRUB SERPL-MCNC: 0.4 MG/DL (ref 0–1.2)
BUN SERPL-MCNC: 11 MG/DL (ref 6–20)
BUN/CREAT SERPL: 10 (ref 9–20)
CALCIUM SERPL-MCNC: 10.2 MG/DL (ref 8.7–10.2)
CHLORIDE SERPL-SCNC: 101 MMOL/L (ref 96–106)
CO2 SERPL-SCNC: 24 MMOL/L (ref 20–29)
CREAT SERPL-MCNC: 1.1 MG/DL (ref 0.76–1.27)
EGFRCR SERPLBLD CKD-EPI 2021: 89 ML/MIN/1.73
EOSINOPHIL # BLD AUTO: 0.2 X10E3/UL (ref 0–0.4)
EOSINOPHIL NFR BLD AUTO: 3 %
ERYTHROCYTE [DISTWIDTH] IN BLOOD BY AUTOMATED COUNT: 14.2 % (ref 11.6–15.4)
FOLATE SERPL-MCNC: >20 NG/ML
GLOBULIN SER CALC-MCNC: 2.7 G/DL (ref 1.5–4.5)
GLUCOSE SERPL-MCNC: 71 MG/DL (ref 70–99)
HCT VFR BLD AUTO: 46 % (ref 37.5–51)
HGB BLD-MCNC: 15.1 G/DL (ref 13–17.7)
IMM GRANULOCYTES # BLD AUTO: 0 X10E3/UL (ref 0–0.1)
IMM GRANULOCYTES NFR BLD AUTO: 0 %
LYMPHOCYTES # BLD AUTO: 3.5 X10E3/UL (ref 0.7–3.1)
LYMPHOCYTES NFR BLD AUTO: 47 %
MCH RBC QN AUTO: 28.9 PG (ref 26.6–33)
MCHC RBC AUTO-ENTMCNC: 32.8 G/DL (ref 31.5–35.7)
MCV RBC AUTO: 88 FL (ref 79–97)
MONOCYTES # BLD AUTO: 0.5 X10E3/UL (ref 0.1–0.9)
MONOCYTES NFR BLD AUTO: 7 %
MYOGLOBIN SERPL-MCNC: 41 NG/ML (ref 28–72)
NEUTROPHILS # BLD AUTO: 3.1 X10E3/UL (ref 1.4–7)
NEUTROPHILS NFR BLD AUTO: 42 %
PLATELET # BLD AUTO: 363 X10E3/UL (ref 150–450)
POTASSIUM SERPL-SCNC: 4.4 MMOL/L (ref 3.5–5.2)
PROT SERPL-MCNC: 7.8 G/DL (ref 6–8.5)
RBC # BLD AUTO: 5.23 X10E6/UL (ref 4.14–5.8)
SARS-COV-2 RNA RESP QL NAA+PROBE: NOT DETECTED
SODIUM SERPL-SCNC: 141 MMOL/L (ref 134–144)
TSH SERPL DL<=0.005 MIU/L-ACNC: 1.11 UIU/ML (ref 0.45–4.5)
VIT B12 SERPL-MCNC: 745 PG/ML (ref 232–1245)
WBC # BLD AUTO: 7.4 X10E3/UL (ref 3.4–10.8)

## 2023-03-02 RX ORDER — ERGOCALCIFEROL 1.25 MG/1
50000 CAPSULE ORAL
Qty: 8 CAPSULE | Refills: 0 | Status: SHIPPED | OUTPATIENT
Start: 2023-03-02

## 2023-03-02 RX ORDER — ACETAMINOPHEN 500 MG
2000 TABLET ORAL 2 TIMES DAILY
Qty: 30 CAPSULE | Refills: 3 | Status: SHIPPED | OUTPATIENT
Start: 2023-03-02

## 2023-03-02 NOTE — PROGRESS NOTES
Vitamin D 50,000 units once weekly for 8 weeks and following Rx for Vitamin d 2000 units daily sent to pharmacy.

## 2023-03-02 NOTE — PROGRESS NOTES
Please let patient know that his labs are WNL with exception of vitamin d level, this is very low. I would like to start him on 50,000 units once weekly of vitamin d for 8 weeks followed by 2000 units once daily thereafter. We can recheck vitamin d is 3 months. Low vitamin d can lead to significant fatigue. Other sources of natural vitamin d includes sunlight, cod liver oil, and vitamin d fortified orange juice, dairy and cereal products. Still waiting for UA and Covid to result.

## 2023-03-02 NOTE — PROGRESS NOTES
Patient notified  that his labs are WNL with exception of vitamin d level, this is very low. I would like to start him on 50,000 units once weekly of vitamin d for 8 weeks followed by 2000 units once daily thereafter. We can recheck vitamin d is 3 months. Low vitamin d can lead to significant fatigue. Other sources of natural vitamin d includes sunlight, cod liver oil, and vitamin d fortified orange juice, dairy and cereal products.  Still waiting for UA and Covid to result.

## 2023-03-06 ENCOUNTER — HOSPITAL ENCOUNTER (EMERGENCY)
Age: 38
Discharge: HOME | End: 2023-03-06
Attending: EMERGENCY MEDICINE
Payer: MEDICAID

## 2023-03-06 VITALS
HEIGHT: 67 IN | SYSTOLIC BLOOD PRESSURE: 138 MMHG | RESPIRATION RATE: 19 BRPM | WEIGHT: 225 LBS | TEMPERATURE: 97.6 F | BODY MASS INDEX: 35.31 KG/M2 | HEART RATE: 80 BPM | DIASTOLIC BLOOD PRESSURE: 96 MMHG | OXYGEN SATURATION: 100 %

## 2023-03-06 DIAGNOSIS — S05.02XA ABRASION OF LEFT CORNEA, INITIAL ENCOUNTER: Primary | ICD-10-CM

## 2023-03-06 PROCEDURE — 99283 EMERGENCY DEPT VISIT LOW MDM: CPT

## 2023-03-06 PROCEDURE — 74011000272 HC RX REV CODE- 272

## 2023-03-06 PROCEDURE — 74011000250 HC RX REV CODE- 250

## 2023-03-06 PROCEDURE — 74011250637 HC RX REV CODE- 250/637: Performed by: EMERGENCY MEDICINE

## 2023-03-06 RX ORDER — ACETAMINOPHEN 325 MG/1
650 TABLET ORAL
Qty: 20 TABLET | Refills: 0 | Status: SHIPPED | OUTPATIENT
Start: 2023-03-06

## 2023-03-06 RX ORDER — TETRACAINE HYDROCHLORIDE 5 MG/ML
SOLUTION OPHTHALMIC
Status: COMPLETED
Start: 2023-03-06 | End: 2023-03-06

## 2023-03-06 RX ORDER — ERYTHROMYCIN 5 MG/G
OINTMENT OPHTHALMIC
Status: COMPLETED | OUTPATIENT
Start: 2023-03-06 | End: 2023-03-06

## 2023-03-06 RX ORDER — ERYTHROMYCIN 5 MG/G
OINTMENT OPHTHALMIC
Qty: 3.5 G | Refills: 0 | Status: SHIPPED | OUTPATIENT
Start: 2023-03-06 | End: 2023-03-13

## 2023-03-06 RX ORDER — TROPICAMIDE 10 MG/ML
SOLUTION/ DROPS OPHTHALMIC
Status: DISCONTINUED
Start: 2023-03-06 | End: 2023-03-06 | Stop reason: HOSPADM

## 2023-03-06 RX ORDER — ACETAMINOPHEN 500 MG
1000 TABLET ORAL ONCE
Status: COMPLETED | OUTPATIENT
Start: 2023-03-06 | End: 2023-03-06

## 2023-03-06 RX ADMIN — ERYTHROMYCIN: 5 OINTMENT OPHTHALMIC at 11:36

## 2023-03-06 RX ADMIN — FLUORESCEIN SODIUM: 1 STRIP OPHTHALMIC at 11:19

## 2023-03-06 RX ADMIN — TETRACAINE HYDROCHLORIDE: 5 SOLUTION OPHTHALMIC at 11:19

## 2023-03-06 RX ADMIN — ACETAMINOPHEN 1000 MG: 500 TABLET ORAL at 11:35

## 2023-03-06 RX ADMIN — Medication: at 11:18

## 2023-03-06 NOTE — ED TRIAGE NOTES
Pt reports having a branch strike left eye this morning. Pt states eye feels gritty and like something is in it after flushing at home. Pain is 5/10. No changes in visual acuity, difficult to keep eyes open.

## 2023-03-06 NOTE — ED PROVIDER NOTES
[facility] EMERGENCY DEPT  EMERGENCY DEPARTMENT HISTORY AND PHYSICAL EXAM      Date: 3/6/2023  Patient Name: Dexter Martini.   MRN: [id number]  YOB: 1985  Date of evaluation: 3/6/2023  Provider: Rosemary De La Cruz MD   Note Started: 11:26 AM 3/6/23    HISTORY OF PRESENT ILLNESS     Chief Complaint   Patient presents with    Eye Pain       History Provided By: Patient    HPI: Homero Cuello Sr. is a 40 y.o. male     PAST MEDICAL HISTORY   Past Medical History:  Past Medical History:   Diagnosis Date    Acute gastritis without hemorrhage 4/21/2021    Alcohol abuse 4/21/2021    Anxiety and depression 4/21/2021    Class 1 obesity due to excess calories with serious comorbidity and body mass index (BMI) of 32.0 to 32.9 in adult 4/21/2021    Constipation 4/8/2021    Essential hypertension 4/21/2021    GERD (gastroesophageal reflux disease)     Impingement syndrome of left shoulder 4/21/2021    Irritable bowel syndrome 6/25/2018    Irritable bowel syndrome     Non-traumatic rhabdomyolysis 12/24/2020       Past Surgical History:  Past Surgical History:   Procedure Laterality Date    COLONOSCOPY N/A 05/22/2018    COLONOSCOPY performed by Aakash Mcmillan MD at [address] [place] [address] 63 VISIT-OUTPATIENT  2012    HX OTHER SURGICAL      biopsy taken of left arm infection/    HX WISDOM TEETH EXTRACTION  06/13/2022       Family History:  Family History   Problem Relation Age of Onset    Heart Disease Mother     Diabetes Mother     Hypertension Mother     Liver Disease Father     Cancer Father     Prostate Cancer Father        Social History:  Social History     Tobacco Use    Smoking status: Every Day     Years: 20.00     Types: Cigarettes    Smokeless tobacco: Never    Tobacco comments:     1 pack every 3 days   Vaping Use    Vaping Use: Every day    Substances: Nicotine   Substance Use Topics    Alcohol use: Not Currently     Comment: Patient reports he has quit drinking alcohol     Drug use: Not Currently     Types: Cocaine, Marijuana, Prescription     Comment: 3 months ago marijuana       Allergies: Allergies   Allergen Reactions    Cashew Nut Hives    Egg Derived Hives    Milk Other (comments)     GI issues    Onion Hives    Peanut Other (comments)     GI issues    Tomato Hives    Nsaids (Non-Steroidal Anti-Inflammatory Drug) Other (comments)     H/o ulcers       PCP: Naida Tripathi NP    Current Meds:   Previous Medications    AMLODIPINE (NORVASC) 2.5 MG TABLET    Take 1 Tablet by mouth daily. BUPROPION SR (WELLBUTRIN SR) 150 MG SR TABLET    Take 1 Tablet by mouth two (2) times a day. CARBAMAZEPINE (TEGRETOL) 100 MG CHEWABLE TABLET    Take 150 mg by mouth two (2) times a day. CHOLECALCIFEROL (VITAMIN D3) (2,000 UNITS /50 MCG) CAP CAPSULE    Take 1 Capsule by mouth two (2) times a day. Start this medication upon completion of high dose therapy for 8 weeks. Indications: low vitamin D levels    ERGOCALCIFEROL (ERGOCALCIFEROL) 1,250 MCG (50,000 UNIT) CAPSULE    Take 1 Capsule by mouth every seven (7) days. Indications: vitamin D deficiency (high dose therapy)    ESOMEPRAZOLE (NEXIUM) 40 MG CAPSULE    Take 1 Capsule by mouth daily. FEXOFENADINE (ALLEGRA) 180 MG TABLET    Take 1 Tablet by mouth daily. FLUTICASONE PROPIONATE (FLONASE) 50 MCG/ACTUATION NASAL SPRAY    2 Sprays by Both Nostrils route daily. HYDROXYZINE HCL (ATARAX) 25 MG TABLET    Take 1 Tablet by mouth every six (6) hours as needed for Itching. HYOSCYAMINE SR (LEVBID) 0.375 MG SR TABLET    TAKE 1 TABLET BY MOUTH EVERY 12 HOURS AS NEEDED FOR CRAMPS    MONTELUKAST (SINGULAIR) 10 MG TABLET    Take 1 Tablet by mouth daily. REVIEW OF SYSTEMS   Review of Systems   Eyes:  Positive for pain and visual disturbance. Negative for photophobia, discharge, redness and itching. All other systems reviewed and are negative. Positives and Pertinent negatives as per HPI.     PHYSICAL EXAM     ED Triage Vitals [03/06/23 1113]   ED Encounter Vitals Group BP (!) 138/96      Pulse (Heart Rate) 80      Resp Rate 19      Temp 97.6 °F (36.4 °C)      Temp src       O2 Sat (%) 100 %      Weight 225 lb      Height 5' 7\"      Physical Exam  Vitals and nursing note reviewed. Constitutional:       General: He is not in acute distress. Appearance: Normal appearance. He is not ill-appearing, toxic-appearing or diaphoretic. Eyes:      General: Lids are normal. Lids are everted, no foreign bodies appreciated. Vision grossly intact. Gaze aligned appropriately. Left eye: No foreign body or discharge. Extraocular Movements: Extraocular movements intact. Left eye: Normal extraocular motion and no nystagmus. Conjunctiva/sclera: Conjunctivae normal.      Right eye: Right conjunctiva is not injected. Left eye: Left conjunctiva is not injected. Pupils: Pupils are equal, round, and reactive to light. Left eye: Pupil is round and reactive. Corneal abrasion and fluorescein uptake present. Neurological:      Mental Status: He is alert. SCREENINGS        No data recorded    LAB, EKG AND DIAGNOSTIC RESULTS   Labs:  No results found for this or any previous visit (from the past 12 hour(s)). Radiologic Studies:  Non-plain film images such as CT, Ultrasound and MRI are read by the radiologist. Plain radiographic images are visualized and preliminarily interpreted by the ED Physician with the following findings: Not applicable    Interpretation per the Radiologist below, if available at the time of this note:  No results found. PROCEDURES   Unless otherwise noted below, none.   Procedures    CRITICAL CARE TIME   None  ED COURSE and DIFFERENTIAL DIAGNOSIS/MDM   CC/HPI Summary, DDx, ED Course, and Reassessment: 60-year-old male, self-employed, patient states he was cutting a tree branch and was wearing safety glasses when the branch of the tree went up under his glasses and injured his left eye, patient states he rinsed the eye immediately presents complaining of eye pain    Ophthalmic fluorescein, eyewash, tetracaine ordered  P.o. Tylenol administered for pain  Ophthalmic erythromycin ointment administered    Disposition Considerations (Tests not done, Shared Decision Making, Pt Expectation of Test or Treatment.):      Vitals:    Vitals:    03/06/23 1113 03/06/23 1115   BP: (!) 138/96    Pulse: 80    Resp: 19    Temp: 97.6 °F (36.4 °C)    SpO2: 100% 100%   Weight: 102.1 kg (225 lb)    Height: 5' 7\" (1.702 m)              Patient was given the following medications:  Medications   sod borate-boric ac-NaCl-water (EYE WASH) opthalmic solution (has no administration in time range)   fluorescein (FUL-SONAM) 1 mg ophthalmic strip (has no administration in time range)   tetracaine HCl (PF) (PONTOCAINE) 0.5 % ophthalmic solution (has no administration in time range)   tropicamide (mydRIACYL) 1 % ophthalmic solution (has no administration in time range)       CONSULTS: (Who and What was discussed)  None     Social Determinants affecting Dx or Tx: None    Records Reviewed (source and summary of external notes): None    FINAL IMPRESSION     1. Abrasion of left cornea, initial encounter          DISPOSITION/PLAN       Discharge Note: The patient is stable for discharge home. The signs, symptoms, diagnosis, and discharge instructions have been discussed, understanding conveyed, and agreed upon. The patient is to follow up as recommended or return to ER should their symptoms worsen. PATIENT REFERRED TO:  Follow-up Information    None           DISCHARGE MEDICATIONS:  Current Discharge Medication List            DISCONTINUED MEDICATIONS:  Current Discharge Medication List          I am the Primary Clinician of Record: Gladys Mendez MD (electronically signed)    (Please note that parts of this dictation were completed with voice recognition software.  Quite often unanticipated grammatical, syntax, homophones, and other interpretive errors are inadvertently transcribed by the computer software. Please disregards these errors.  Please excuse any errors that have escaped final proofreading.)

## 2023-03-06 NOTE — ED NOTES
Pt given discharge and follow up instructions. Pt advised to follow with PCP. No further questions at this time.

## 2023-03-20 DIAGNOSIS — Z88.9 H/O SEASONAL ALLERGIES: ICD-10-CM

## 2023-03-20 RX ORDER — HYDROXYZINE 25 MG/1
TABLET, FILM COATED ORAL
Qty: 120 TABLET | Refills: 0 | Status: SHIPPED | OUTPATIENT
Start: 2023-03-20

## 2023-04-17 RX ORDER — HYOSCYAMINE SULFATE 0.38 MG/1
TABLET, EXTENDED RELEASE ORAL
Qty: 60 TABLET | Refills: 0 | Status: SHIPPED | OUTPATIENT
Start: 2023-04-17

## 2023-04-22 DIAGNOSIS — Z88.9 H/O SEASONAL ALLERGIES: ICD-10-CM

## 2023-04-23 RX ORDER — FLUTICASONE PROPIONATE 50 MCG
SPRAY, SUSPENSION (ML) NASAL
Qty: 16 G | Refills: 0 | Status: SHIPPED | OUTPATIENT
Start: 2023-04-23

## 2023-05-08 RX ORDER — BUPROPION HYDROCHLORIDE 150 MG/1
TABLET, EXTENDED RELEASE ORAL
Qty: 180 TABLET | Refills: 0 | Status: SHIPPED | OUTPATIENT
Start: 2023-05-08

## 2023-05-11 ENCOUNTER — HOSPITAL ENCOUNTER (EMERGENCY)
Facility: HOSPITAL | Age: 38
Discharge: HOME | End: 2023-05-11
Attending: EMERGENCY MEDICINE
Payer: MEDICAID

## 2023-05-11 VITALS
WEIGHT: 215 LBS | SYSTOLIC BLOOD PRESSURE: 150 MMHG | HEIGHT: 67 IN | BODY MASS INDEX: 33.74 KG/M2 | OXYGEN SATURATION: 98 % | TEMPERATURE: 98.6 F | RESPIRATION RATE: 16 BRPM | HEART RATE: 75 BPM | DIASTOLIC BLOOD PRESSURE: 104 MMHG

## 2023-05-11 DIAGNOSIS — L25.9 CONTACT DERMATITIS, UNSPECIFIED CONTACT DERMATITIS TYPE, UNSPECIFIED TRIGGER: Primary | ICD-10-CM

## 2023-05-11 PROCEDURE — 6370000000 HC RX 637 (ALT 250 FOR IP): Performed by: EMERGENCY MEDICINE

## 2023-05-11 PROCEDURE — 6360000002 HC RX W HCPCS: Performed by: EMERGENCY MEDICINE

## 2023-05-11 PROCEDURE — 99284 EMERGENCY DEPT VISIT MOD MDM: CPT

## 2023-05-11 PROCEDURE — 96372 THER/PROPH/DIAG INJ SC/IM: CPT

## 2023-05-11 RX ORDER — PREDNISONE 20 MG/1
20 TABLET ORAL DAILY
Qty: 10 TABLET | Refills: 0 | Status: SHIPPED | OUTPATIENT
Start: 2023-05-11 | End: 2023-08-24

## 2023-05-11 RX ORDER — DIPHENHYDRAMINE HCL 25 MG
25 TABLET ORAL
Status: COMPLETED | OUTPATIENT
Start: 2023-05-11 | End: 2023-05-11

## 2023-05-11 RX ORDER — METHYLPREDNISOLONE SODIUM SUCCINATE 125 MG/2ML
125 INJECTION, POWDER, LYOPHILIZED, FOR SOLUTION INTRAMUSCULAR; INTRAVENOUS
Status: COMPLETED | OUTPATIENT
Start: 2023-05-11 | End: 2023-05-11

## 2023-05-11 RX ORDER — CETIRIZINE HYDROCHLORIDE 10 MG/1
10 TABLET ORAL DAILY
Status: DISCONTINUED | OUTPATIENT
Start: 2023-05-11 | End: 2023-05-11 | Stop reason: HOSPADM

## 2023-05-11 RX ADMIN — METHYLPREDNISOLONE SODIUM SUCCINATE 125 MG: 125 INJECTION INTRAMUSCULAR; INTRAVENOUS at 20:31

## 2023-05-11 RX ADMIN — CETIRIZINE HYDROCHLORIDE 10 MG: 10 TABLET, FILM COATED ORAL at 20:31

## 2023-05-11 RX ADMIN — DIPHENHYDRAMINE HYDROCHLORIDE 25 MG: 25 TABLET ORAL at 20:31

## 2023-05-11 ASSESSMENT — ENCOUNTER SYMPTOMS
SHORTNESS OF BREATH: 0
EYES NEGATIVE: 1

## 2023-05-11 ASSESSMENT — PAIN - FUNCTIONAL ASSESSMENT: PAIN_FUNCTIONAL_ASSESSMENT: NONE - DENIES PAIN

## 2023-05-12 ASSESSMENT — ENCOUNTER SYMPTOMS
EYES NEGATIVE: 1
GASTROINTESTINAL NEGATIVE: 1
RESPIRATORY NEGATIVE: 1
SHORTNESS OF BREATH: 0

## 2023-05-12 NOTE — DISCHARGE INSTRUCTIONS
Recommend changing gloves and take prednisone as directed. Follow-up with PCP on Monday or if symptoms worsen return to ED immediately.

## 2023-05-12 NOTE — ED TRIAGE NOTES
Patient reports allergic reaction to bilateral hands x 4 days. Patient denies any SOB or N/V. Rash noted to bilateral hands. Pt states he is a , pt brought new gloves recently.

## 2023-05-12 NOTE — ED PROVIDER NOTES
[facility] EMERGENCY DEPT  EMERGENCY DEPARTMENT ENCOUNTER       Pt Name: Yissel Wheeler Sr. MRN: [id number]  Birthdate 1985  Date of evaluation: 5/11/2023  Provider: Claudetta Rase, MD   PCP: ARI Wilder NP  Note Started: 8:16 PM 5/11/23     CHIEF COMPLAINT       Chief Complaint   Patient presents with    Allergic Reaction        HISTORY OF PRESENT ILLNESS: 1 or more elements      History From: Patient  None     Yissel Wheeler Sr. is a 41 y.o. male who presents allergic reaction to both hands with itching rash for 4 days. 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 He denies SOB,chest pain, nausea or vomiting. Previous reaction several years ago. He use the steroid cream he had. Nursing Notes were all reviewed and agreed with or any disagreements were addressed in the HPI. REVIEW OF SYSTEMS      Review of Systems   Constitutional: Negative. HENT: Negative. Eyes: Negative. Respiratory: Negative. Negative for shortness of breath. Cardiovascular: Negative. Gastrointestinal: Negative. Endocrine: Negative. Genitourinary: Negative. Musculoskeletal: Negative. Skin:  Positive for rash. Macular rash on both hands with erythemous changes. Psychiatric/Behavioral: Negative. All other systems reviewed and are negative. Positives and Pertinent negatives as per HPI.     PAST HISTORY     Past Medical History:  Past Medical History:   Diagnosis Date    Acute gastritis without hemorrhage 4/21/2021    Alcohol abuse 4/21/2021    Anxiety and depression 4/21/2021    Class 1 obesity due to excess calories with serious comorbidity and body mass index (BMI) of 32.0 to 32.9 in adult 4/21/2021    Constipation 4/8/2021    Essential hypertension 4/21/2021    GERD (gastroesophageal reflux disease)     Impingement syndrome of left shoulder 4/21/2021    Irritable bowel syndrome 6/25/2018    Irritable bowel syndrome

## 2023-05-24 RX ORDER — BUPROPION HYDROCHLORIDE 150 MG/1
150 TABLET, EXTENDED RELEASE ORAL 2 TIMES DAILY
Qty: 180 TABLET | Refills: 0 | Status: SHIPPED | OUTPATIENT
Start: 2023-05-24

## 2023-05-26 RX ORDER — HYOSCYAMINE SULFATE EXTENDED-RELEASE 0.38 MG/1
TABLET ORAL
Qty: 60 TABLET | Refills: 0 | Status: SHIPPED | OUTPATIENT
Start: 2023-05-26

## 2023-06-01 ENCOUNTER — OFFICE VISIT (OUTPATIENT)
Facility: CLINIC | Age: 38
End: 2023-06-01
Payer: MEDICAID

## 2023-06-01 VITALS
OXYGEN SATURATION: 98 % | TEMPERATURE: 98.5 F | RESPIRATION RATE: 18 BRPM | DIASTOLIC BLOOD PRESSURE: 89 MMHG | HEART RATE: 100 BPM | SYSTOLIC BLOOD PRESSURE: 140 MMHG | HEIGHT: 67 IN | WEIGHT: 211.8 LBS | BODY MASS INDEX: 33.24 KG/M2

## 2023-06-01 DIAGNOSIS — Z09 HOSPITAL DISCHARGE FOLLOW-UP: ICD-10-CM

## 2023-06-01 DIAGNOSIS — G71.19 ISAAC'S SYNDROME: Primary | ICD-10-CM

## 2023-06-01 DIAGNOSIS — I10 ESSENTIAL HYPERTENSION: ICD-10-CM

## 2023-06-01 DIAGNOSIS — R07.89 OTHER CHEST PAIN: ICD-10-CM

## 2023-06-01 DIAGNOSIS — E66.09 CLASS 1 OBESITY DUE TO EXCESS CALORIES WITH SERIOUS COMORBIDITY AND BODY MASS INDEX (BMI) OF 33.0 TO 33.9 IN ADULT: ICD-10-CM

## 2023-06-01 PROCEDURE — 3078F DIAST BP <80 MM HG: CPT

## 2023-06-01 PROCEDURE — 99214 OFFICE O/P EST MOD 30 MIN: CPT

## 2023-06-01 PROCEDURE — 3074F SYST BP LT 130 MM HG: CPT

## 2023-06-01 RX ORDER — LANOLIN ALCOHOL/MO/W.PET/CERES
400 CREAM (GRAM) TOPICAL DAILY
COMMUNITY
Start: 2023-03-21

## 2023-06-01 RX ORDER — POTASSIUM CHLORIDE 750 MG/1
1 TABLET, FILM COATED, EXTENDED RELEASE ORAL DAILY
COMMUNITY
Start: 2021-01-26

## 2023-06-01 SDOH — ECONOMIC STABILITY: FOOD INSECURITY: WITHIN THE PAST 12 MONTHS, YOU WORRIED THAT YOUR FOOD WOULD RUN OUT BEFORE YOU GOT MONEY TO BUY MORE.: NEVER TRUE

## 2023-06-01 SDOH — ECONOMIC STABILITY: HOUSING INSECURITY
IN THE LAST 12 MONTHS, WAS THERE A TIME WHEN YOU DID NOT HAVE A STEADY PLACE TO SLEEP OR SLEPT IN A SHELTER (INCLUDING NOW)?: NO

## 2023-06-01 SDOH — ECONOMIC STABILITY: FOOD INSECURITY: WITHIN THE PAST 12 MONTHS, THE FOOD YOU BOUGHT JUST DIDN'T LAST AND YOU DIDN'T HAVE MONEY TO GET MORE.: NEVER TRUE

## 2023-06-01 SDOH — ECONOMIC STABILITY: INCOME INSECURITY: HOW HARD IS IT FOR YOU TO PAY FOR THE VERY BASICS LIKE FOOD, HOUSING, MEDICAL CARE, AND HEATING?: NOT VERY HARD

## 2023-06-01 ASSESSMENT — PATIENT HEALTH QUESTIONNAIRE - PHQ9
SUM OF ALL RESPONSES TO PHQ9 QUESTIONS 1 & 2: 0
SUM OF ALL RESPONSES TO PHQ QUESTIONS 1-9: 0
SUM OF ALL RESPONSES TO PHQ QUESTIONS 1-9: 0
2. FEELING DOWN, DEPRESSED OR HOPELESS: 0
1. LITTLE INTEREST OR PLEASURE IN DOING THINGS: 0
SUM OF ALL RESPONSES TO PHQ QUESTIONS 1-9: 0
SUM OF ALL RESPONSES TO PHQ QUESTIONS 1-9: 0

## 2023-06-04 ENCOUNTER — TELEPHONE (OUTPATIENT)
Facility: CLINIC | Age: 38
End: 2023-06-04

## 2023-06-04 DIAGNOSIS — Z88.9 ALLERGY STATUS TO UNSPECIFIED DRUGS, MEDICAMENTS AND BIOLOGICAL SUBSTANCES: Primary | ICD-10-CM

## 2023-06-04 DIAGNOSIS — T78.40XD ALLERGIC REACTION, SUBSEQUENT ENCOUNTER: ICD-10-CM

## 2023-06-04 RX ORDER — PREDNISONE 20 MG/1
TABLET ORAL
Qty: 18 TABLET | Refills: 0 | Status: SHIPPED | OUTPATIENT
Start: 2023-06-04

## 2023-06-04 RX ORDER — CETIRIZINE HYDROCHLORIDE 10 MG/1
10 TABLET ORAL DAILY
Qty: 30 TABLET | Refills: 0 | Status: SHIPPED | OUTPATIENT
Start: 2023-06-04 | End: 2023-07-04

## 2023-06-07 PROBLEM — E66.09 CLASS 1 OBESITY DUE TO EXCESS CALORIES WITH SERIOUS COMORBIDITY AND BODY MASS INDEX (BMI) OF 33.0 TO 33.9 IN ADULT: Status: ACTIVE | Noted: 2021-04-21

## 2023-06-07 PROBLEM — K29.00 ACUTE GASTRITIS WITHOUT HEMORRHAGE: Status: RESOLVED | Noted: 2021-04-21 | Resolved: 2023-06-07

## 2023-06-07 PROBLEM — R07.89 OTHER CHEST PAIN: Status: ACTIVE | Noted: 2023-06-07

## 2023-06-07 PROBLEM — E66.811 CLASS 1 OBESITY DUE TO EXCESS CALORIES WITH SERIOUS COMORBIDITY AND BODY MASS INDEX (BMI) OF 33.0 TO 33.9 IN ADULT: Status: ACTIVE | Noted: 2021-04-21

## 2023-06-07 PROBLEM — Z09 HOSPITAL DISCHARGE FOLLOW-UP: Status: ACTIVE | Noted: 2023-06-07

## 2023-06-07 RX ORDER — AMLODIPINE BESYLATE 5 MG/1
5 TABLET ORAL DAILY
Qty: 30 TABLET | Refills: 1 | Status: SHIPPED | OUTPATIENT
Start: 2023-06-07

## 2023-06-07 ASSESSMENT — ENCOUNTER SYMPTOMS
SINUS PAIN: 0
NAUSEA: 0
BACK PAIN: 0
EYE PAIN: 0
BLOOD IN STOOL: 0
RHINORRHEA: 0
SORE THROAT: 0
VOMITING: 0
ABDOMINAL PAIN: 0
CONSTIPATION: 0
DIARRHEA: 0
RESPIRATORY NEGATIVE: 1

## 2023-06-07 NOTE — ASSESSMENT & PLAN NOTE
Today is at goal of < 140/90 just barely, however prior readings are elevated as well as those reported to IVIG infusions. Will increase amlodipine now to 5mg. Take as directed. Encouraged home monitoring of BP and provider notification if > 140/90 consistently. Recent labs reviewed. 1-2 week follow up for NV BP check.

## 2023-06-07 NOTE — ASSESSMENT & PLAN NOTE
Patient denies chest pain, shortness of breath, dizziness, palpitations or near syncope today. Symptoms began during IVIG infusion. Patient also reports bilateral arm/hands hives that started following a previous IVIG infusion. Symptoms could be adverse reactions to IVIG infusions and should be reported to Dr. Teodoro Padilla immediately. Patient states he has follow up scheduled prior to next infusion. Recommended to keep follow up. EKG was NSR in ED. Patient does not want evaluation by cardiology at this time. States he has not had chest or shortness of breath, new fatigue, dizziness, palpitations or near syncope. If recurrence he will see cardiology. Contact provider immediately or seek ER care if chest pain, shortness of breath, dizziness, lightheadedness, palpitations or near syncope occurs.

## 2023-06-07 NOTE — ASSESSMENT & PLAN NOTE
Following with Dr. Noam Bonilla Neurology and is actively in IVIG trials. Recommended patient contact Dr. Aquiles Jeffries regarding chest pain/shortness of breath during IVIG infusion. Patient also endorses allergic reaction to bilateral hands/arms that started following IVIG infusions.

## 2023-06-07 NOTE — ASSESSMENT & PLAN NOTE
Discussed importance of weight loss to aid in management of HTN. Goal to decrease intake of high carb, high fat intake to include fast, fried, and greasy foods. Goal of exercising 3 to 5 days per week in 30-40 minute increments.

## 2023-06-21 RX ORDER — HYOSCYAMINE SULFATE EXTENDED-RELEASE 0.38 MG/1
TABLET ORAL
Qty: 60 TABLET | Refills: 0 | Status: SHIPPED | OUTPATIENT
Start: 2023-06-21

## 2023-06-24 DIAGNOSIS — T78.40XD ALLERGIC REACTION, SUBSEQUENT ENCOUNTER: ICD-10-CM

## 2023-06-25 RX ORDER — CETIRIZINE HYDROCHLORIDE 10 MG/1
TABLET ORAL
Qty: 30 TABLET | Refills: 0 | Status: SHIPPED | OUTPATIENT
Start: 2023-06-25

## 2023-07-07 PROBLEM — Z09 HOSPITAL DISCHARGE FOLLOW-UP: Status: RESOLVED | Noted: 2023-06-07 | Resolved: 2023-07-07

## 2023-07-27 RX ORDER — HYDROXYZINE HYDROCHLORIDE 25 MG/1
TABLET, FILM COATED ORAL
Qty: 120 TABLET | Refills: 0 | Status: SHIPPED | OUTPATIENT
Start: 2023-07-27

## 2023-07-27 RX ORDER — ESOMEPRAZOLE MAGNESIUM 40 MG/1
CAPSULE, DELAYED RELEASE ORAL
Qty: 90 CAPSULE | Refills: 0 | Status: SHIPPED | OUTPATIENT
Start: 2023-07-27

## 2023-08-14 DIAGNOSIS — I10 ESSENTIAL HYPERTENSION: ICD-10-CM

## 2023-08-16 RX ORDER — AMLODIPINE BESYLATE 5 MG/1
TABLET ORAL
Qty: 90 TABLET | Refills: 1 | Status: SHIPPED | OUTPATIENT
Start: 2023-08-16

## 2023-09-14 DIAGNOSIS — T78.40XD ALLERGIC REACTION, SUBSEQUENT ENCOUNTER: Primary | ICD-10-CM

## 2023-09-14 RX ORDER — PREDNISONE 20 MG/1
TABLET ORAL
Qty: 9 TABLET | Refills: 0 | Status: SHIPPED | OUTPATIENT
Start: 2023-09-14

## 2023-09-14 RX ORDER — HYDROXYZINE PAMOATE 25 MG/1
25 CAPSULE ORAL 4 TIMES DAILY PRN
Qty: 30 CAPSULE | Refills: 0 | Status: SHIPPED | OUTPATIENT
Start: 2023-09-14

## 2023-09-16 ENCOUNTER — HOSPITAL ENCOUNTER (EMERGENCY)
Facility: HOSPITAL | Age: 38
Discharge: HOME | End: 2023-09-16
Attending: EMERGENCY MEDICINE
Payer: MEDICAID

## 2023-09-16 VITALS
SYSTOLIC BLOOD PRESSURE: 148 MMHG | BODY MASS INDEX: 33.74 KG/M2 | TEMPERATURE: 97.8 F | OXYGEN SATURATION: 100 % | HEART RATE: 90 BPM | RESPIRATION RATE: 19 BRPM | DIASTOLIC BLOOD PRESSURE: 90 MMHG | WEIGHT: 215 LBS | HEIGHT: 67 IN

## 2023-09-16 DIAGNOSIS — T30.0 ERYTHEMA DUE TO BURN (FIRST DEGREE): Primary | ICD-10-CM

## 2023-09-16 DIAGNOSIS — S05.01XA ABRASION OF RIGHT CORNEA, INITIAL ENCOUNTER: ICD-10-CM

## 2023-09-16 LAB
ALBUMIN SERPL-MCNC: 4.5 G/DL (ref 3.5–5)
ALBUMIN/GLOB SERPL: 0.7 (ref 1.1–2.2)
ALP SERPL-CCNC: 66 U/L (ref 45–117)
ALT SERPL-CCNC: 63 U/L (ref 12–78)
ANION GAP SERPL CALC-SCNC: 7 MMOL/L (ref 5–15)
AST SERPL W P-5'-P-CCNC: 48 U/L (ref 15–37)
BASOPHILS # BLD: 0 K/UL (ref 0–0.1)
BASOPHILS NFR BLD: 0 % (ref 0–1)
BILIRUB SERPL-MCNC: 0.5 MG/DL (ref 0.2–1)
BUN SERPL-MCNC: 26 MG/DL (ref 6–20)
BUN/CREAT SERPL: 18 (ref 12–20)
CA-I BLD-MCNC: 10.2 MG/DL (ref 8.5–10.1)
CHLORIDE SERPL-SCNC: 100 MMOL/L (ref 97–108)
CO2 SERPL-SCNC: 30 MMOL/L (ref 21–32)
CREAT SERPL-MCNC: 1.48 MG/DL (ref 0.7–1.3)
DIFFERENTIAL METHOD BLD: ABNORMAL
EOSINOPHIL # BLD: 0 K/UL (ref 0–0.4)
EOSINOPHIL NFR BLD: 0 % (ref 0–7)
ERYTHROCYTE [DISTWIDTH] IN BLOOD BY AUTOMATED COUNT: 14.3 % (ref 11.5–14.5)
ETHANOL SERPL-MCNC: <10 MG/DL (ref 0–0.08)
GLOBULIN SER CALC-MCNC: 6.5 G/DL (ref 2–4)
GLUCOSE SERPL-MCNC: 105 MG/DL (ref 65–100)
HCT VFR BLD AUTO: 41.4 % (ref 36.6–50.3)
HGB BLD-MCNC: 13.9 G/DL (ref 12.1–17)
IMM GRANULOCYTES # BLD AUTO: 0 K/UL (ref 0–0.04)
IMM GRANULOCYTES NFR BLD AUTO: 0 % (ref 0–0.5)
LYMPHOCYTES # BLD: 2.3 K/UL (ref 0.8–3.5)
LYMPHOCYTES NFR BLD: 24 % (ref 12–49)
MAGNESIUM SERPL-MCNC: 1.9 MG/DL (ref 1.6–2.4)
MCH RBC QN AUTO: 28.4 PG (ref 26–34)
MCHC RBC AUTO-ENTMCNC: 33.6 G/DL (ref 30–36.5)
MCV RBC AUTO: 84.5 FL (ref 80–99)
MONOCYTES # BLD: 0.3 K/UL (ref 0–1)
MONOCYTES NFR BLD: 4 % (ref 5–13)
NEUTS SEG # BLD: 6.9 K/UL (ref 1.8–8)
NEUTS SEG NFR BLD: 72 % (ref 32–75)
NRBC # BLD: 0 K/UL (ref 0–0.01)
NRBC BLD-RTO: 0 PER 100 WBC
PLATELET # BLD AUTO: 329 K/UL (ref 150–400)
PMV BLD AUTO: 10.2 FL (ref 8.9–12.9)
POTASSIUM SERPL-SCNC: 3.5 MMOL/L (ref 3.5–5.1)
PROT SERPL-MCNC: 11 G/DL (ref 6.4–8.2)
RBC # BLD AUTO: 4.9 M/UL (ref 4.1–5.7)
SODIUM SERPL-SCNC: 137 MMOL/L (ref 136–145)
WBC # BLD AUTO: 9.5 K/UL (ref 4.1–11.1)

## 2023-09-16 PROCEDURE — 6360000002 HC RX W HCPCS: Performed by: EMERGENCY MEDICINE

## 2023-09-16 PROCEDURE — 36415 COLL VENOUS BLD VENIPUNCTURE: CPT

## 2023-09-16 PROCEDURE — 6370000000 HC RX 637 (ALT 250 FOR IP): Performed by: EMERGENCY MEDICINE

## 2023-09-16 PROCEDURE — 2580000003 HC RX 258: Performed by: EMERGENCY MEDICINE

## 2023-09-16 PROCEDURE — 96374 THER/PROPH/DIAG INJ IV PUSH: CPT

## 2023-09-16 PROCEDURE — 83735 ASSAY OF MAGNESIUM: CPT

## 2023-09-16 PROCEDURE — 99284 EMERGENCY DEPT VISIT MOD MDM: CPT

## 2023-09-16 PROCEDURE — 80053 COMPREHEN METABOLIC PANEL: CPT

## 2023-09-16 PROCEDURE — 85025 COMPLETE CBC W/AUTO DIFF WBC: CPT

## 2023-09-16 PROCEDURE — 82077 ASSAY SPEC XCP UR&BREATH IA: CPT

## 2023-09-16 RX ORDER — ERYTHROMYCIN 5 MG/G
OINTMENT OPHTHALMIC
Status: COMPLETED | OUTPATIENT
Start: 2023-09-16 | End: 2023-09-16

## 2023-09-16 RX ORDER — ACETAMINOPHEN 500 MG
500 TABLET ORAL EVERY 6 HOURS PRN
Qty: 40 TABLET | Refills: 1 | Status: SHIPPED | OUTPATIENT
Start: 2023-09-16

## 2023-09-16 RX ORDER — 0.9 % SODIUM CHLORIDE 0.9 %
1000 INTRAVENOUS SOLUTION INTRAVENOUS ONCE
Status: COMPLETED | OUTPATIENT
Start: 2023-09-16 | End: 2023-09-16

## 2023-09-16 RX ORDER — BACITRACIN ZINC 500 [USP'U]/G
OINTMENT TOPICAL
Status: COMPLETED | OUTPATIENT
Start: 2023-09-16 | End: 2023-09-16

## 2023-09-16 RX ORDER — ERYTHROMYCIN 5 MG/G
OINTMENT OPHTHALMIC
Qty: 3.5 G | Refills: 0 | Status: SHIPPED | OUTPATIENT
Start: 2023-09-16 | End: 2023-09-26

## 2023-09-16 RX ORDER — TETRACAINE HYDROCHLORIDE 5 MG/ML
2 SOLUTION OPHTHALMIC
Status: COMPLETED | OUTPATIENT
Start: 2023-09-16 | End: 2023-09-16

## 2023-09-16 RX ORDER — SOFT LENS RINSE,STORE SOLUTION
1 SOLUTION, NON-ORAL MISCELLANEOUS PRN
Status: DISCONTINUED | OUTPATIENT
Start: 2023-09-16 | End: 2023-09-16 | Stop reason: HOSPADM

## 2023-09-16 RX ADMIN — SODIUM CHLORIDE 1000 ML: 9 INJECTION, SOLUTION INTRAVENOUS at 19:51

## 2023-09-16 RX ADMIN — TETRACAINE HYDROCHLORIDE 2 DROP: 5 SOLUTION OPHTHALMIC at 19:11

## 2023-09-16 RX ADMIN — BACITRACIN ZINC: 500 OINTMENT TOPICAL at 20:16

## 2023-09-16 RX ADMIN — ERYTHROMYCIN: 5 OINTMENT OPHTHALMIC at 19:49

## 2023-09-16 RX ADMIN — HYDROMORPHONE HYDROCHLORIDE 0.5 MG: 1 INJECTION, SOLUTION INTRAMUSCULAR; INTRAVENOUS; SUBCUTANEOUS at 19:49

## 2023-09-16 RX ADMIN — FLUORESCEIN SODIUM 1 MG: 1 STRIP OPHTHALMIC at 19:11

## 2023-09-16 RX ADMIN — WATER, PURIFIED 1 DROP: 99.05 LIQUID OPHTHALMIC at 19:11

## 2023-09-16 ASSESSMENT — PAIN DESCRIPTION - PAIN TYPE: TYPE: ACUTE PAIN

## 2023-09-16 ASSESSMENT — PAIN SCALES - GENERAL
PAINLEVEL_OUTOF10: 0
PAINLEVEL_OUTOF10: 10

## 2023-09-16 ASSESSMENT — PAIN DESCRIPTION - LOCATION
LOCATION: FACE
LOCATION: FACE

## 2023-09-16 ASSESSMENT — PAIN - FUNCTIONAL ASSESSMENT: PAIN_FUNCTIONAL_ASSESSMENT: 0-10

## 2023-09-16 NOTE — ED NOTES
Pt instructed to wash skin exposed to irritant and change in to gown.      Abigail Bejarano RN  09/16/23 0021

## 2023-09-16 NOTE — ED PROVIDER NOTES
[facility] EMERGENCY DEPT  EMERGENCY DEPARTMENT HISTORY AND PHYSICAL EXAM      Date: (Not on file)  Patient Name: Andrews Ahumada MRN: [id number]  YOB: 1985  Date of evaluation: 9/16/2023  Provider: Fina Azul MD   Note Started: 6:41 PM EDT 9/16/23    HISTORY OF PRESENT ILLNESS     Chief Complaint   Patient presents with    Chemical Exposure       History Provided By: Patient    HPI: Mireya Koch Sr. is a 41 y.o. male     PAST MEDICAL HISTORY   Past Medical History:  Past Medical History:   Diagnosis Date    Acute gastritis without hemorrhage 4/21/2021    Alcohol abuse 4/21/2021    Anxiety and depression 4/21/2021    Class 1 obesity due to excess calories with serious comorbidity and body mass index (BMI) of 32.0 to 32.9 in adult 4/21/2021    Constipation 4/8/2021    Essential hypertension 4/21/2021    GERD (gastroesophageal reflux disease)     Impingement syndrome of left shoulder 4/21/2021    Irritable bowel syndrome 6/25/2018    Irritable bowel syndrome     Non-traumatic rhabdomyolysis 12/24/2020       Past Surgical History:  Past Surgical History:   Procedure Laterality Date    COLONOSCOPY N/A 05/22/2018    COLONOSCOPY performed by Laura Medrano MD at [address] [place] [address]  2012    OTHER SURGICAL HISTORY      biopsy taken of left arm infection/    WISDOM TOOTH EXTRACTION  06/13/2022       Family History:  Family History   Problem Relation Age of Onset    Prostate Cancer Father     Cancer Father     Liver Disease Father     Hypertension Mother     Heart Disease Mother     Diabetes Mother        Social History:  Social History     Tobacco Use    Smoking status: Every Day    Smokeless tobacco: Never   Substance Use Topics    Alcohol use: Not Currently    Drug use: Not Currently     Types: Prescription, Marijuana Graciella Muzzy), Cocaine       Allergies:   Allergies   Allergen Reactions    Eggs Or Egg-Derived Products Anaphylaxis    Milk (Cow) Other (See Comments)     GI issues parts of this dictation were completed with voice recognition software. Quite often unanticipated grammatical, syntax, homophones, and other interpretive errors are inadvertently transcribed by the computer software. Please disregards these errors.  Please excuse any errors that have escaped final proofreading.)     Hilaria Franklin MD  09/17/23 8630

## 2023-09-16 NOTE — ED NOTES
Contacted poison control on behalf of pt. Poison control instructed us to have pt change clothes and wash skin, use bacitracin for buns, and further irrigate pts eyes if PH is less than 7.       Abigail Bejarano RN  09/16/23 2606
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
## 2023-09-17 ASSESSMENT — VISUAL ACUITY: OU: 1

## 2023-09-17 NOTE — ED NOTES
Bacitracin applied to pts face. Pt and pts family member provided water and a cup of ice. Currently waiting on pts NS bolus to finish to discharge.      Omar Pan, RN  09/16/23 [address], RN  09/16/23 2024

## 2023-09-17 NOTE — ED NOTES
Pt given discharge instructions and verbalized understanding. Pt advised to follow up with opthalmology.      Evelio Foote RN  09/16/23 3031

## 2023-09-17 NOTE — ED NOTES
Spoke with Poison Control again and gave update on pt and poison control suggests follow up with Opthalmology.      Alejandra Hahn RN  09/16/23 2043

## 2023-09-29 RX ORDER — HYOSCYAMINE SULFATE EXTENDED-RELEASE 0.38 MG/1
TABLET ORAL
Qty: 60 TABLET | Refills: 0 | OUTPATIENT
Start: 2023-09-29

## 2023-09-30 RX ORDER — HYOSCYAMINE SULFATE EXTENDED-RELEASE 0.38 MG/1
375 TABLET ORAL EVERY 12 HOURS PRN
Qty: 60 TABLET | Refills: 0 | Status: SHIPPED | OUTPATIENT
Start: 2023-09-30

## 2023-10-12 RX ORDER — HYOSCYAMINE SULFATE EXTENDED-RELEASE 0.38 MG/1
TABLET ORAL
Qty: 60 TABLET | Refills: 0 | Status: SHIPPED | OUTPATIENT
Start: 2023-10-12

## 2023-10-24 ENCOUNTER — HOSPITAL ENCOUNTER (EMERGENCY)
Facility: HOSPITAL | Age: 38
Discharge: HOME | End: 2023-10-24
Attending: EMERGENCY MEDICINE
Payer: MEDICAID

## 2023-10-24 ENCOUNTER — APPOINTMENT (OUTPATIENT)
Facility: HOSPITAL | Age: 38
End: 2023-10-24
Attending: EMERGENCY MEDICINE
Payer: MEDICAID

## 2023-10-24 VITALS
HEART RATE: 76 BPM | TEMPERATURE: 97.1 F | DIASTOLIC BLOOD PRESSURE: 103 MMHG | RESPIRATION RATE: 21 BRPM | SYSTOLIC BLOOD PRESSURE: 150 MMHG | OXYGEN SATURATION: 100 %

## 2023-10-24 DIAGNOSIS — R74.8 ELEVATED CPK: ICD-10-CM

## 2023-10-24 DIAGNOSIS — R07.89 ATYPICAL CHEST PAIN: Primary | ICD-10-CM

## 2023-10-24 LAB
ALBUMIN SERPL-MCNC: 4.3 G/DL (ref 3.5–5)
ALBUMIN/GLOB SERPL: 0.8 (ref 1.1–2.2)
ALP SERPL-CCNC: 66 U/L (ref 45–117)
ALT SERPL-CCNC: 59 U/L (ref 12–78)
ANION GAP SERPL CALC-SCNC: 10 MMOL/L (ref 5–15)
AST SERPL W P-5'-P-CCNC: 37 U/L (ref 15–37)
BASOPHILS # BLD: 0.1 K/UL (ref 0–0.1)
BASOPHILS NFR BLD: 1 % (ref 0–1)
BILIRUB SERPL-MCNC: 0.3 MG/DL (ref 0.2–1)
BUN SERPL-MCNC: 6 MG/DL (ref 6–20)
BUN/CREAT SERPL: 5 (ref 12–20)
CA-I BLD-MCNC: 9.9 MG/DL (ref 8.5–10.1)
CHLORIDE SERPL-SCNC: 98 MMOL/L (ref 97–108)
CK SERPL-CCNC: 329 U/L (ref 39–308)
CO2 SERPL-SCNC: 29 MMOL/L (ref 21–32)
CREAT SERPL-MCNC: 1.13 MG/DL (ref 0.7–1.3)
DIFFERENTIAL METHOD BLD: ABNORMAL
EOSINOPHIL # BLD: 0.1 K/UL (ref 0–0.4)
EOSINOPHIL NFR BLD: 2 % (ref 0–7)
ERYTHROCYTE [DISTWIDTH] IN BLOOD BY AUTOMATED COUNT: 13.6 % (ref 11.5–14.5)
GLOBULIN SER CALC-MCNC: 5.1 G/DL (ref 2–4)
GLUCOSE SERPL-MCNC: 85 MG/DL (ref 65–100)
HCT VFR BLD AUTO: 43.1 % (ref 36.6–50.3)
HGB BLD-MCNC: 14.8 G/DL (ref 12.1–17)
IMM GRANULOCYTES # BLD AUTO: 0 K/UL (ref 0–0.04)
IMM GRANULOCYTES NFR BLD AUTO: 0 % (ref 0–0.5)
LYMPHOCYTES # BLD: 4.1 K/UL (ref 0.8–3.5)
LYMPHOCYTES NFR BLD: 49 % (ref 12–49)
MCH RBC QN AUTO: 28.8 PG (ref 26–34)
MCHC RBC AUTO-ENTMCNC: 34.3 G/DL (ref 30–36.5)
MCV RBC AUTO: 83.9 FL (ref 80–99)
MONOCYTES # BLD: 0.5 K/UL (ref 0–1)
MONOCYTES NFR BLD: 6 % (ref 5–13)
NEUTS SEG # BLD: 3.4 K/UL (ref 1.8–8)
NEUTS SEG NFR BLD: 42 % (ref 32–75)
NRBC # BLD: 0 K/UL (ref 0–0.01)
NRBC BLD-RTO: 0 PER 100 WBC
PLATELET # BLD AUTO: 320 K/UL (ref 150–400)
PMV BLD AUTO: 9.8 FL (ref 8.9–12.9)
POTASSIUM SERPL-SCNC: 3.6 MMOL/L (ref 3.5–5.1)
PROT SERPL-MCNC: 9.4 G/DL (ref 6.4–8.2)
RBC # BLD AUTO: 5.14 M/UL (ref 4.1–5.7)
SODIUM SERPL-SCNC: 137 MMOL/L (ref 136–145)
TROPONIN I SERPL HS-MCNC: 75 NG/L (ref 0–76)
TROPONIN I SERPL HS-MCNC: 76 NG/L (ref 0–76)
WBC # BLD AUTO: 8.3 K/UL (ref 4.1–11.1)

## 2023-10-24 PROCEDURE — 82550 ASSAY OF CK (CPK): CPT

## 2023-10-24 PROCEDURE — 80053 COMPREHEN METABOLIC PANEL: CPT

## 2023-10-24 PROCEDURE — 85025 COMPLETE CBC W/AUTO DIFF WBC: CPT

## 2023-10-24 PROCEDURE — 93005 ELECTROCARDIOGRAM TRACING: CPT | Performed by: EMERGENCY MEDICINE

## 2023-10-24 PROCEDURE — 99285 EMERGENCY DEPT VISIT HI MDM: CPT

## 2023-10-24 PROCEDURE — 71045 X-RAY EXAM CHEST 1 VIEW: CPT

## 2023-10-24 PROCEDURE — 2580000003 HC RX 258: Performed by: EMERGENCY MEDICINE

## 2023-10-24 PROCEDURE — 84484 ASSAY OF TROPONIN QUANT: CPT

## 2023-10-24 RX ORDER — 0.9 % SODIUM CHLORIDE 0.9 %
1000 INTRAVENOUS SOLUTION INTRAVENOUS ONCE
Status: COMPLETED | OUTPATIENT
Start: 2023-10-24 | End: 2023-10-24

## 2023-10-24 RX ADMIN — SODIUM CHLORIDE 1000 ML: 9 INJECTION, SOLUTION INTRAVENOUS at 14:36

## 2023-10-24 ASSESSMENT — PAIN DESCRIPTION - PAIN TYPE: TYPE: ACUTE PAIN

## 2023-10-24 ASSESSMENT — PAIN - FUNCTIONAL ASSESSMENT: PAIN_FUNCTIONAL_ASSESSMENT: 0-10

## 2023-10-24 ASSESSMENT — PAIN SCALES - GENERAL: PAINLEVEL_OUTOF10: 0

## 2023-10-24 NOTE — ED PROVIDER NOTES
[facility] EMERGENCY DEPT  EMERGENCY DEPARTMENT HISTORY AND PHYSICAL EXAM      Date: 10/24/2023  Patient Name: Naheed Garcia Sr. MRN: [id number]  YOB: 1985  Date of evaluation: 10/24/2023  Provider: Ben Chauhan MD   Note Started: 2:17 PM EDT 10/24/23    HISTORY OF PRESENT ILLNESS     Chief Complaint   Patient presents with    Chest Pain       History Provided By: Patient    HPI: Naheed Garcia Sr. is a 41 y.o. male     PAST MEDICAL HISTORY   Past Medical History:  Past Medical History:   Diagnosis Date    Acute gastritis without hemorrhage 4/21/2021    Alcohol abuse 4/21/2021    Anxiety and depression 4/21/2021    Class 1 obesity due to excess calories with serious comorbidity and body mass index (BMI) of 32.0 to 32.9 in adult 4/21/2021    Constipation 4/8/2021    Essential hypertension 4/21/2021    GERD (gastroesophageal reflux disease)     Impingement syndrome of left shoulder 4/21/2021    Irritable bowel syndrome 6/25/2018    Irritable bowel syndrome     Non-traumatic rhabdomyolysis 12/24/2020       Past Surgical History:  Past Surgical History:   Procedure Laterality Date    COLONOSCOPY N/A 05/22/2018    COLONOSCOPY performed by Ozzie Andrade MD at [address] [place] [address]  2012    OTHER SURGICAL HISTORY      biopsy taken of left arm infection/    WISDOM TOOTH EXTRACTION  06/13/2022       Family History:  Family History   Problem Relation Age of Onset    Prostate Cancer Father     Cancer Father     Liver Disease Father     Hypertension Mother     Heart Disease Mother     Diabetes Mother        Social History:  Social History     Tobacco Use    Smoking status: Every Day    Smokeless tobacco: Never   Substance Use Topics    Alcohol use: Not Currently    Drug use: Not Currently     Types: Prescription, Marijuana Salli Endo), Cocaine       Allergies:   Allergies   Allergen Reactions    Eggs Or Egg-Derived Products Anaphylaxis    Milk (Cow) Other (See Comments)     GI issues    Onion known as: NORVASC  Take 1 tablet by mouth once daily     buPROPion 150 MG extended release tablet  Commonly known as: WELLBUTRIN SR  Take 1 tablet by mouth 2 times daily     carBAMazepine 100 MG chewable tablet  Commonly known as: TEGRETOL     cetirizine 10 MG tablet  Commonly known as: ZYRTEC  Take 1 tablet by mouth once daily     esomeprazole 40 MG delayed release capsule  Commonly known as: NEXIUM  Take 1 capsule by mouth once daily     fexofenadine 180 MG tablet  Commonly known as: ALLEGRA     fluticasone 50 MCG/ACT nasal spray  Commonly known as: FLONASE     hydrOXYzine HCl 25 MG tablet  Commonly known as: ATARAX  TAKE 1 TABLET BY MOUTH EVERY 6 HOURS AS NEEDED FOR ITCHING     hydrOXYzine pamoate 25 MG capsule  Commonly known as: Vistaril  Take 1 capsule by mouth 4 times daily as needed for Itching     hyoscyamine 375 MCG extended release tablet  Commonly known as: LEVBID  TAKE 1 TABLET BY MOUTH EVERY 12 HOURS AS NEEDED FOR CRAMPS     magnesium oxide 400 (240 Mg) MG tablet  Commonly known as: MAG-OX     montelukast 10 MG tablet  Commonly known as: SINGULAIR     potassium chloride 10 MEQ extended release tablet  Commonly known as: KLOR-CON     predniSONE 20 MG tablet  Commonly known as: DELTASONE  Take 2 tabs by mouth once daily for 3 days then take 1 tab by mouth once daily for 3 days. DISCONTINUED MEDICATIONS:  Current Discharge Medication List          I am the Primary Clinician of Record: Aristeo Angel MD (electronically signed)    (Please note that parts of this dictation were completed with voice recognition software. Quite often unanticipated grammatical, syntax, homophones, and other interpretive errors are inadvertently transcribed by the computer software. Please disregards these errors.  Please excuse any errors that have escaped final proofreading.)      Aristeo Angel MD  10/27/23 2121

## 2023-10-25 ENCOUNTER — OFFICE VISIT (OUTPATIENT)
Facility: CLINIC | Age: 38
End: 2023-10-25
Payer: MEDICAID

## 2023-10-25 VITALS
BODY MASS INDEX: 35.31 KG/M2 | HEIGHT: 67 IN | TEMPERATURE: 98.2 F | DIASTOLIC BLOOD PRESSURE: 79 MMHG | WEIGHT: 225 LBS | HEART RATE: 89 BPM | SYSTOLIC BLOOD PRESSURE: 139 MMHG | OXYGEN SATURATION: 96 % | RESPIRATION RATE: 18 BRPM

## 2023-10-25 DIAGNOSIS — G71.19 ISAAC'S SYNDROME: ICD-10-CM

## 2023-10-25 DIAGNOSIS — I10 ESSENTIAL HYPERTENSION: ICD-10-CM

## 2023-10-25 DIAGNOSIS — R07.89 OTHER CHEST PAIN: Primary | ICD-10-CM

## 2023-10-25 DIAGNOSIS — L30.1 DYSHIDROTIC ECZEMA: ICD-10-CM

## 2023-10-25 LAB
EKG ATRIAL RATE: 75 BPM
EKG DIAGNOSIS: NORMAL
EKG P AXIS: -1 DEGREES
EKG P-R INTERVAL: 185 MS
EKG Q-T INTERVAL: 363 MS
EKG QRS DURATION: 91 MS
EKG QTC CALCULATION (BAZETT): 403 MS
EKG R AXIS: 2 DEGREES
EKG T AXIS: 16 DEGREES
EKG VENTRICULAR RATE: 74 BPM

## 2023-10-25 PROCEDURE — 3078F DIAST BP <80 MM HG: CPT

## 2023-10-25 PROCEDURE — 3075F SYST BP GE 130 - 139MM HG: CPT

## 2023-10-25 PROCEDURE — 99214 OFFICE O/P EST MOD 30 MIN: CPT

## 2023-10-25 RX ORDER — PREDNISONE 20 MG/1
TABLET ORAL
Qty: 9 TABLET | Refills: 0 | Status: SHIPPED | OUTPATIENT
Start: 2023-10-25

## 2023-10-25 ASSESSMENT — ENCOUNTER SYMPTOMS
RESPIRATORY NEGATIVE: 1
GASTROINTESTINAL NEGATIVE: 1
EYES NEGATIVE: 1

## 2023-10-25 ASSESSMENT — PATIENT HEALTH QUESTIONNAIRE - PHQ9
7. TROUBLE CONCENTRATING ON THINGS, SUCH AS READING THE NEWSPAPER OR WATCHING TELEVISION: 0
3. TROUBLE FALLING OR STAYING ASLEEP: 0
SUM OF ALL RESPONSES TO PHQ QUESTIONS 1-9: 0
1. LITTLE INTEREST OR PLEASURE IN DOING THINGS: 0
SUM OF ALL RESPONSES TO PHQ QUESTIONS 1-9: 0
4. FEELING TIRED OR HAVING LITTLE ENERGY: 0
SUM OF ALL RESPONSES TO PHQ QUESTIONS 1-9: 0
8. MOVING OR SPEAKING SO SLOWLY THAT OTHER PEOPLE COULD HAVE NOTICED. OR THE OPPOSITE, BEING SO FIGETY OR RESTLESS THAT YOU HAVE BEEN MOVING AROUND A LOT MORE THAN USUAL: 0
5. POOR APPETITE OR OVEREATING: 0
SUM OF ALL RESPONSES TO PHQ QUESTIONS 1-9: 0
6. FEELING BAD ABOUT YOURSELF - OR THAT YOU ARE A FAILURE OR HAVE LET YOURSELF OR YOUR FAMILY DOWN: 0
9. THOUGHTS THAT YOU WOULD BE BETTER OFF DEAD, OR OF HURTING YOURSELF: 0
2. FEELING DOWN, DEPRESSED OR HOPELESS: 0
10. IF YOU CHECKED OFF ANY PROBLEMS, HOW DIFFICULT HAVE THESE PROBLEMS MADE IT FOR YOU TO DO YOUR WORK, TAKE CARE OF THINGS AT HOME, OR GET ALONG WITH OTHER PEOPLE: 0
SUM OF ALL RESPONSES TO PHQ9 QUESTIONS 1 & 2: 0

## 2023-10-25 ASSESSMENT — COLUMBIA-SUICIDE SEVERITY RATING SCALE - C-SSRS
3. HAVE YOU BEEN THINKING ABOUT HOW YOU MIGHT KILL YOURSELF?: NO
5. HAVE YOU STARTED TO WORK OUT OR WORKED OUT THE DETAILS OF HOW TO KILL YOURSELF? DO YOU INTEND TO CARRY OUT THIS PLAN?: NO
4. HAVE YOU HAD THESE THOUGHTS AND HAD SOME INTENTION OF ACTING ON THEM?: NO
7. DID THIS OCCUR IN THE LAST THREE MONTHS: NO

## 2023-10-26 LAB — CK SERPL-CCNC: 315 U/L (ref 49–439)

## 2023-11-01 RX ORDER — ESOMEPRAZOLE MAGNESIUM 40 MG/1
CAPSULE, DELAYED RELEASE ORAL
Qty: 90 CAPSULE | Refills: 0 | Status: SHIPPED | OUTPATIENT
Start: 2023-11-01

## 2023-12-26 RX ORDER — FLUTICASONE PROPIONATE 50 MCG
SPRAY, SUSPENSION (ML) NASAL
Qty: 16 G | Refills: 2 | Status: SHIPPED | OUTPATIENT
Start: 2023-12-26

## 2024-01-29 RX ORDER — HYOSCYAMINE SULFATE EXTENDED-RELEASE 0.38 MG/1
TABLET ORAL
Qty: 60 TABLET | Refills: 3 | Status: SHIPPED | OUTPATIENT
Start: 2024-01-29

## 2024-02-19 RX ORDER — ESOMEPRAZOLE MAGNESIUM 40 MG/1
CAPSULE, DELAYED RELEASE ORAL
Qty: 90 CAPSULE | Refills: 0 | Status: SHIPPED | OUTPATIENT
Start: 2024-02-19

## 2024-03-15 DIAGNOSIS — I10 ESSENTIAL HYPERTENSION: ICD-10-CM

## 2024-03-15 DIAGNOSIS — T78.40XD ALLERGIC REACTION, SUBSEQUENT ENCOUNTER: ICD-10-CM

## 2024-03-15 RX ORDER — BUPROPION HYDROCHLORIDE 150 MG/1
150 TABLET, EXTENDED RELEASE ORAL 2 TIMES DAILY
Qty: 180 TABLET | Refills: 0 | Status: SHIPPED | OUTPATIENT
Start: 2024-03-15

## 2024-03-15 RX ORDER — CETIRIZINE HYDROCHLORIDE 10 MG/1
10 TABLET ORAL DAILY
Qty: 90 TABLET | Refills: 0 | Status: SHIPPED | OUTPATIENT
Start: 2024-03-15

## 2024-03-15 RX ORDER — ESOMEPRAZOLE MAGNESIUM 40 MG/1
40 CAPSULE, DELAYED RELEASE ORAL DAILY
Qty: 90 CAPSULE | Refills: 0 | Status: SHIPPED | OUTPATIENT
Start: 2024-03-15

## 2024-03-15 RX ORDER — AMLODIPINE BESYLATE 5 MG/1
5 TABLET ORAL DAILY
Qty: 90 TABLET | Refills: 0 | Status: SHIPPED | OUTPATIENT
Start: 2024-03-15

## 2024-03-15 RX ORDER — HYOSCYAMINE SULFATE 0.38 MG/1
375 TABLET, EXTENDED RELEASE ORAL EVERY 12 HOURS PRN
Qty: 60 TABLET | Refills: 2 | Status: SHIPPED | OUTPATIENT
Start: 2024-03-15

## 2024-03-15 RX ORDER — FLUTICASONE PROPIONATE 50 MCG
2 SPRAY, SUSPENSION (ML) NASAL DAILY
Qty: 16 G | Refills: 2 | Status: SHIPPED | OUTPATIENT
Start: 2024-03-15

## 2024-05-21 RX ORDER — MEPERIDINE HYDROCHLORIDE 25 MG/ML
12.5 INJECTION INTRAMUSCULAR; INTRAVENOUS; SUBCUTANEOUS PRN
Status: CANCELLED | OUTPATIENT
Start: 2024-05-30

## 2024-05-21 RX ORDER — SODIUM CHLORIDE 9 MG/ML
INJECTION, SOLUTION INTRAVENOUS CONTINUOUS
Status: CANCELLED | OUTPATIENT
Start: 2024-05-30

## 2024-05-21 RX ORDER — ONDANSETRON 2 MG/ML
8 INJECTION INTRAMUSCULAR; INTRAVENOUS
Status: CANCELLED | OUTPATIENT
Start: 2024-05-30

## 2024-05-21 RX ORDER — ACETAMINOPHEN 325 MG/1
650 TABLET ORAL
Status: CANCELLED | OUTPATIENT
Start: 2024-05-30

## 2024-05-21 RX ORDER — SODIUM CHLORIDE 0.9 % (FLUSH) 0.9 %
5-40 SYRINGE (ML) INJECTION PRN
Status: CANCELLED | OUTPATIENT
Start: 2024-05-30

## 2024-05-21 RX ORDER — DIPHENHYDRAMINE HCL 25 MG
25 CAPSULE ORAL ONCE
Status: CANCELLED | OUTPATIENT
Start: 2024-05-30 | End: 2024-05-30

## 2024-05-21 RX ORDER — ACETAMINOPHEN 325 MG/1
650 TABLET ORAL ONCE
Status: CANCELLED | OUTPATIENT
Start: 2024-05-30 | End: 2024-05-30

## 2024-05-21 RX ORDER — DIPHENHYDRAMINE HYDROCHLORIDE 50 MG/ML
50 INJECTION INTRAMUSCULAR; INTRAVENOUS
Status: CANCELLED | OUTPATIENT
Start: 2024-05-30

## 2024-05-21 RX ORDER — HEPARIN 100 UNIT/ML
500 SYRINGE INTRAVENOUS PRN
Status: CANCELLED | OUTPATIENT
Start: 2024-05-30

## 2024-05-21 RX ORDER — SODIUM CHLORIDE 9 MG/ML
5-250 INJECTION, SOLUTION INTRAVENOUS PRN
Status: CANCELLED | OUTPATIENT
Start: 2024-05-30

## 2024-05-21 RX ORDER — EPINEPHRINE 1 MG/ML
0.3 INJECTION, SOLUTION, CONCENTRATE INTRAVENOUS PRN
Status: CANCELLED | OUTPATIENT
Start: 2024-05-30

## 2024-05-21 RX ORDER — ALBUTEROL SULFATE 90 UG/1
4 AEROSOL, METERED RESPIRATORY (INHALATION) PRN
Status: CANCELLED | OUTPATIENT
Start: 2024-05-30

## 2024-05-28 ENCOUNTER — HOSPITAL ENCOUNTER (OUTPATIENT)
Facility: HOSPITAL | Age: 39
Setting detail: INFUSION SERIES
End: 2024-05-28

## 2024-05-30 ENCOUNTER — HOSPITAL ENCOUNTER (OUTPATIENT)
Facility: HOSPITAL | Age: 39
Setting detail: INFUSION SERIES
End: 2024-05-30
Payer: MEDICAID

## 2024-05-30 VITALS
DIASTOLIC BLOOD PRESSURE: 90 MMHG | HEIGHT: 67 IN | SYSTOLIC BLOOD PRESSURE: 135 MMHG | BODY MASS INDEX: 33.04 KG/M2 | WEIGHT: 210.5 LBS | TEMPERATURE: 98.7 F | HEART RATE: 71 BPM | OXYGEN SATURATION: 98 % | RESPIRATION RATE: 16 BRPM

## 2024-05-30 DIAGNOSIS — G71.19 ISAAC'S SYNDROME: Primary | ICD-10-CM

## 2024-05-30 LAB
ALBUMIN SERPL-MCNC: 4.5 G/DL (ref 3.4–5)
ALBUMIN/GLOB SERPL: 1.2 (ref 0.8–1.7)
ALP SERPL-CCNC: 58 U/L (ref 45–117)
ALT SERPL-CCNC: 34 U/L (ref 16–61)
ANION GAP SERPL CALC-SCNC: 7 MMOL/L (ref 3–18)
AST SERPL-CCNC: 30 U/L (ref 10–38)
BASO+EOS+MONOS # BLD AUTO: 0.4 K/UL (ref 0–2.3)
BASO+EOS+MONOS NFR BLD AUTO: 6 % (ref 0.1–17)
BILIRUB DIRECT SERPL-MCNC: 0.2 MG/DL (ref 0–0.2)
BILIRUB SERPL-MCNC: 0.6 MG/DL (ref 0.2–1)
BUN SERPL-MCNC: 13 MG/DL (ref 7–18)
BUN/CREAT SERPL: 13 (ref 12–20)
CALCIUM SERPL-MCNC: 9.8 MG/DL (ref 8.5–10.1)
CHLORIDE SERPL-SCNC: 104 MMOL/L (ref 100–111)
CO2 SERPL-SCNC: 27 MMOL/L (ref 21–32)
CREAT SERPL-MCNC: 1.02 MG/DL (ref 0.6–1.3)
DIFFERENTIAL METHOD BLD: NORMAL
ERYTHROCYTE [DISTWIDTH] IN BLOOD BY AUTOMATED COUNT: 14.1 % (ref 11.5–14.5)
GLOBULIN SER CALC-MCNC: 3.7 G/DL (ref 2–4)
GLUCOSE SERPL-MCNC: 82 MG/DL (ref 74–99)
HCT VFR BLD AUTO: 43.1 % (ref 36–48)
HGB BLD-MCNC: 14.4 G/DL (ref 12–16)
LYMPHOCYTES # BLD: 3.3 K/UL (ref 1.1–5.9)
LYMPHOCYTES NFR BLD: 44 % (ref 14–44)
MCH RBC QN AUTO: 29.6 PG (ref 25–35)
MCHC RBC AUTO-ENTMCNC: 33.4 G/DL (ref 31–37)
MCV RBC AUTO: 88.5 FL (ref 78–102)
NEUTS SEG # BLD: 3.8 K/UL (ref 1.8–9.5)
NEUTS SEG NFR BLD: 51 % (ref 40–70)
PLATELET # BLD AUTO: 323 K/UL (ref 140–440)
POTASSIUM SERPL-SCNC: 3.8 MMOL/L (ref 3.5–5.5)
PROT SERPL-MCNC: 8.2 G/DL (ref 6.4–8.2)
RBC # BLD AUTO: 4.87 M/UL (ref 4.1–5.1)
SODIUM SERPL-SCNC: 138 MMOL/L (ref 136–145)
WBC # BLD AUTO: 7.5 K/UL (ref 4.5–13)

## 2024-05-30 PROCEDURE — 80076 HEPATIC FUNCTION PANEL: CPT

## 2024-05-30 PROCEDURE — 96365 THER/PROPH/DIAG IV INF INIT: CPT

## 2024-05-30 PROCEDURE — 96366 THER/PROPH/DIAG IV INF ADDON: CPT

## 2024-05-30 PROCEDURE — 2580000003 HC RX 258: Performed by: PSYCHIATRY & NEUROLOGY

## 2024-05-30 PROCEDURE — 80048 BASIC METABOLIC PNL TOTAL CA: CPT

## 2024-05-30 PROCEDURE — 6360000002 HC RX W HCPCS: Performed by: PSYCHIATRY & NEUROLOGY

## 2024-05-30 PROCEDURE — 85025 COMPLETE CBC W/AUTO DIFF WBC: CPT

## 2024-05-30 PROCEDURE — 6370000000 HC RX 637 (ALT 250 FOR IP): Performed by: PSYCHIATRY & NEUROLOGY

## 2024-05-30 RX ORDER — ONDANSETRON 2 MG/ML
8 INJECTION INTRAMUSCULAR; INTRAVENOUS
OUTPATIENT
Start: 2024-06-23

## 2024-05-30 RX ORDER — DIPHENHYDRAMINE HCL 25 MG
25 CAPSULE ORAL ONCE
OUTPATIENT
Start: 2024-06-23 | End: 2024-06-23

## 2024-05-30 RX ORDER — ACETAMINOPHEN 325 MG/1
650 TABLET ORAL
OUTPATIENT
Start: 2024-06-23

## 2024-05-30 RX ORDER — ALBUTEROL SULFATE 90 UG/1
4 AEROSOL, METERED RESPIRATORY (INHALATION) PRN
OUTPATIENT
Start: 2024-06-23

## 2024-05-30 RX ORDER — SODIUM CHLORIDE 9 MG/ML
5-250 INJECTION, SOLUTION INTRAVENOUS PRN
OUTPATIENT
Start: 2024-06-23

## 2024-05-30 RX ORDER — ACETAMINOPHEN 325 MG/1
650 TABLET ORAL ONCE
Status: COMPLETED | OUTPATIENT
Start: 2024-05-30 | End: 2024-05-30

## 2024-05-30 RX ORDER — EPINEPHRINE 1 MG/ML
0.3 INJECTION, SOLUTION, CONCENTRATE INTRAVENOUS PRN
OUTPATIENT
Start: 2024-06-23

## 2024-05-30 RX ORDER — SODIUM CHLORIDE 0.9 % (FLUSH) 0.9 %
5-40 SYRINGE (ML) INJECTION PRN
OUTPATIENT
Start: 2024-06-23

## 2024-05-30 RX ORDER — DIPHENHYDRAMINE HYDROCHLORIDE 50 MG/ML
50 INJECTION INTRAMUSCULAR; INTRAVENOUS
OUTPATIENT
Start: 2024-06-23

## 2024-05-30 RX ORDER — DIPHENHYDRAMINE HCL 25 MG
25 CAPSULE ORAL ONCE
Status: COMPLETED | OUTPATIENT
Start: 2024-05-30 | End: 2024-05-30

## 2024-05-30 RX ORDER — ACETAMINOPHEN 325 MG/1
650 TABLET ORAL ONCE
OUTPATIENT
Start: 2024-06-23 | End: 2024-06-23

## 2024-05-30 RX ORDER — MEPERIDINE HYDROCHLORIDE 25 MG/ML
12.5 INJECTION INTRAMUSCULAR; INTRAVENOUS; SUBCUTANEOUS PRN
OUTPATIENT
Start: 2024-06-23

## 2024-05-30 RX ORDER — HEPARIN 100 UNIT/ML
500 SYRINGE INTRAVENOUS PRN
OUTPATIENT
Start: 2024-06-23

## 2024-05-30 RX ORDER — SODIUM CHLORIDE 9 MG/ML
5-250 INJECTION, SOLUTION INTRAVENOUS PRN
Status: ACTIVE | OUTPATIENT
Start: 2024-05-30 | End: 2024-05-31

## 2024-05-30 RX ORDER — SODIUM CHLORIDE 9 MG/ML
INJECTION, SOLUTION INTRAVENOUS CONTINUOUS
OUTPATIENT
Start: 2024-06-23

## 2024-05-30 RX ORDER — SODIUM CHLORIDE 0.9 % (FLUSH) 0.9 %
5-40 SYRINGE (ML) INJECTION PRN
Status: ACTIVE | OUTPATIENT
Start: 2024-05-30 | End: 2024-05-31

## 2024-05-30 RX ADMIN — SODIUM CHLORIDE, PRESERVATIVE FREE 10 ML: 5 INJECTION INTRAVENOUS at 08:20

## 2024-05-30 RX ADMIN — IMMUNE GLOBULIN (HUMAN) 40 G: 10 INJECTION INTRAVENOUS; SUBCUTANEOUS at 09:10

## 2024-05-30 RX ADMIN — IMMUNE GLOBULIN (HUMAN) 5 G: 10 INJECTION INTRAVENOUS; SUBCUTANEOUS at 11:24

## 2024-05-30 RX ADMIN — DIPHENHYDRAMINE HYDROCHLORIDE 25 MG: 25 CAPSULE ORAL at 08:31

## 2024-05-30 RX ADMIN — SODIUM CHLORIDE 25 ML/HR: 9 INJECTION, SOLUTION INTRAVENOUS at 08:25

## 2024-05-30 RX ADMIN — IMMUNE GLOBULIN (HUMAN) 20 G: 10 INJECTION INTRAVENOUS; SUBCUTANEOUS at 10:55

## 2024-05-30 RX ADMIN — ACETAMINOPHEN 650 MG: 325 TABLET ORAL at 08:31

## 2024-05-30 NOTE — PROGRESS NOTES
[facility] Progress Note    Date: May 30, 2024    Name: Gopal Segura Sr.    MRN: [id number]         : 1985    IVIG Q4 weeks     Mr. Segura arrived to [facility] at 0810.    Mr. Segura was assessed and education was provided. Lexicomp education was provided to patient verbally and printed. Advised him of adverse reactions. Pt verbalized understanding. Pt reports he has tolerated IVIG in past.    Discussed titration w/ [facility] pharm Destiny, she approved maintenance titration sequence based on todays weight 210.5 lbs (95.5 kg).    Mr. Segura's vitals were reviewed.  Vitals:    24 0810   BP: (!) 145/88   Pulse: 75   Resp: 16   Temp: 98.1 °F (36.7 °C)   SpO2: 98%       22g IV inserted in patient's LEFT antecubital  x1 attempt.  Positive for blood return/ flushes without difficulty. CBC, BMP, hepatic function labs drawn from PIV. CBC processed on site. BMP & hepatic sent via  for processing    IVIG administered over approx 2 hours as ordered followed by NS flush. Infusion started at 114 mL/hr for 30 minutes, increased to 228 mL/hr for 30 minutes, increased to of 342 mL/hr for 30 minutes, then increased to max rate of 456 mL/hr for remainder of infusion.     Mr. Segura tolerated well without complaints.     IV removed/ intact.  Gauze/ coban to site.    [facility] condition at 1140.  He is to return on 24 at 0900 for his next appointment.    Phoebe Apple RN  May 30, 2024

## 2024-06-19 DIAGNOSIS — T78.40XD ALLERGIC REACTION, SUBSEQUENT ENCOUNTER: ICD-10-CM

## 2024-06-20 RX ORDER — FLUTICASONE PROPIONATE 50 MCG
2 SPRAY, SUSPENSION (ML) NASAL DAILY
Qty: 16 G | Refills: 0 | Status: SHIPPED | OUTPATIENT
Start: 2024-06-20

## 2024-06-20 RX ORDER — ESOMEPRAZOLE MAGNESIUM 40 MG/1
40 CAPSULE, DELAYED RELEASE ORAL DAILY
Qty: 30 CAPSULE | Refills: 0 | Status: SHIPPED | OUTPATIENT
Start: 2024-06-20

## 2024-06-20 RX ORDER — CETIRIZINE HYDROCHLORIDE 10 MG/1
10 TABLET ORAL DAILY
Qty: 30 TABLET | Refills: 0 | Status: SHIPPED | OUTPATIENT
Start: 2024-06-20

## 2024-06-25 ENCOUNTER — APPOINTMENT (OUTPATIENT)
Facility: HOSPITAL | Age: 39
End: 2024-06-25
Payer: MEDICAID

## 2024-06-27 ENCOUNTER — HOSPITAL ENCOUNTER (OUTPATIENT)
Facility: HOSPITAL | Age: 39
Setting detail: INFUSION SERIES
End: 2024-06-27
Payer: MEDICAID

## 2024-06-27 VITALS
TEMPERATURE: 98.1 F | DIASTOLIC BLOOD PRESSURE: 82 MMHG | RESPIRATION RATE: 16 BRPM | WEIGHT: 209.6 LBS | SYSTOLIC BLOOD PRESSURE: 130 MMHG | BODY MASS INDEX: 32.83 KG/M2 | HEART RATE: 75 BPM | OXYGEN SATURATION: 97 %

## 2024-06-27 DIAGNOSIS — G71.19 ISAAC'S SYNDROME: Primary | ICD-10-CM

## 2024-06-27 PROCEDURE — 96365 THER/PROPH/DIAG IV INF INIT: CPT

## 2024-06-27 PROCEDURE — 6360000002 HC RX W HCPCS: Performed by: PSYCHIATRY & NEUROLOGY

## 2024-06-27 PROCEDURE — 96366 THER/PROPH/DIAG IV INF ADDON: CPT

## 2024-06-27 PROCEDURE — 6370000000 HC RX 637 (ALT 250 FOR IP): Performed by: PSYCHIATRY & NEUROLOGY

## 2024-06-27 PROCEDURE — 2580000003 HC RX 258: Performed by: PSYCHIATRY & NEUROLOGY

## 2024-06-27 RX ORDER — ACETAMINOPHEN 325 MG/1
650 TABLET ORAL ONCE
Status: COMPLETED | OUTPATIENT
Start: 2024-06-27 | End: 2024-06-27

## 2024-06-27 RX ORDER — SODIUM CHLORIDE 9 MG/ML
5-250 INJECTION, SOLUTION INTRAVENOUS PRN
Status: ACTIVE | OUTPATIENT
Start: 2024-06-27 | End: 2024-06-28

## 2024-06-27 RX ORDER — SODIUM CHLORIDE 9 MG/ML
INJECTION, SOLUTION INTRAVENOUS CONTINUOUS
OUTPATIENT
Start: 2024-07-25

## 2024-06-27 RX ORDER — ONDANSETRON 2 MG/ML
8 INJECTION INTRAMUSCULAR; INTRAVENOUS
OUTPATIENT
Start: 2024-07-25

## 2024-06-27 RX ORDER — SODIUM CHLORIDE 9 MG/ML
5-250 INJECTION, SOLUTION INTRAVENOUS PRN
OUTPATIENT
Start: 2024-07-25

## 2024-06-27 RX ORDER — EPINEPHRINE 1 MG/ML
0.3 INJECTION, SOLUTION, CONCENTRATE INTRAVENOUS PRN
OUTPATIENT
Start: 2024-07-25

## 2024-06-27 RX ORDER — ACETAMINOPHEN 325 MG/1
650 TABLET ORAL
OUTPATIENT
Start: 2024-07-25

## 2024-06-27 RX ORDER — DIPHENHYDRAMINE HCL 25 MG
25 CAPSULE ORAL ONCE
Status: COMPLETED | OUTPATIENT
Start: 2024-06-27 | End: 2024-06-27

## 2024-06-27 RX ORDER — SODIUM CHLORIDE 0.9 % (FLUSH) 0.9 %
5-40 SYRINGE (ML) INJECTION PRN
Status: ACTIVE | OUTPATIENT
Start: 2024-06-27 | End: 2024-06-28

## 2024-06-27 RX ORDER — ACETAMINOPHEN 325 MG/1
650 TABLET ORAL ONCE
OUTPATIENT
Start: 2024-07-25 | End: 2024-07-25

## 2024-06-27 RX ORDER — HEPARIN 100 UNIT/ML
500 SYRINGE INTRAVENOUS PRN
OUTPATIENT
Start: 2024-07-25

## 2024-06-27 RX ORDER — MEPERIDINE HYDROCHLORIDE 25 MG/ML
12.5 INJECTION INTRAMUSCULAR; INTRAVENOUS; SUBCUTANEOUS PRN
OUTPATIENT
Start: 2024-07-25

## 2024-06-27 RX ORDER — DIPHENHYDRAMINE HCL 25 MG
25 CAPSULE ORAL ONCE
OUTPATIENT
Start: 2024-07-25 | End: 2024-07-25

## 2024-06-27 RX ORDER — DIPHENHYDRAMINE HYDROCHLORIDE 50 MG/ML
50 INJECTION INTRAMUSCULAR; INTRAVENOUS
OUTPATIENT
Start: 2024-07-25

## 2024-06-27 RX ORDER — ALBUTEROL SULFATE 90 UG/1
4 AEROSOL, METERED RESPIRATORY (INHALATION) PRN
OUTPATIENT
Start: 2024-07-25

## 2024-06-27 RX ORDER — SODIUM CHLORIDE 0.9 % (FLUSH) 0.9 %
5-40 SYRINGE (ML) INJECTION PRN
OUTPATIENT
Start: 2024-07-25

## 2024-06-27 RX ADMIN — DIPHENHYDRAMINE HYDROCHLORIDE 25 MG: 25 CAPSULE ORAL at 09:14

## 2024-06-27 RX ADMIN — ACETAMINOPHEN 650 MG: 325 TABLET ORAL at 09:14

## 2024-06-27 RX ADMIN — IMMUNE GLOBULIN (HUMAN) 5 G: 10 INJECTION INTRAVENOUS; SUBCUTANEOUS at 12:23

## 2024-06-27 RX ADMIN — IMMUNE GLOBULIN (HUMAN) 20 G: 10 INJECTION INTRAVENOUS; SUBCUTANEOUS at 11:52

## 2024-06-27 RX ADMIN — IMMUNE GLOBULIN (HUMAN) 40 G: 10 INJECTION INTRAVENOUS; SUBCUTANEOUS at 09:59

## 2024-06-27 RX ADMIN — SODIUM CHLORIDE, PRESERVATIVE FREE 10 ML: 5 INJECTION INTRAVENOUS at 09:15

## 2024-06-27 RX ADMIN — SODIUM CHLORIDE 25 ML/HR: 9 INJECTION, SOLUTION INTRAVENOUS at 09:23

## 2024-06-27 ASSESSMENT — PAIN - FUNCTIONAL ASSESSMENT
PAIN_FUNCTIONAL_ASSESSMENT: NONE - DENIES PAIN
PAIN_FUNCTIONAL_ASSESSMENT: NONE - DENIES PAIN

## 2024-06-27 NOTE — PROGRESS NOTES
[facility] Progress Note    Date: 2024    Name: Gopal Segura Sr.    MRN: [id number]         : 1985      IVIG Q4 weeks       Mr. Segura arrived to [facility] at 0850.    Mr. Segura was assessed and education was provided.     Patient reports tolerating last infusion of IVIG well without side effects. 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    Mr. Segura's vitals were reviewed.  Vitals:    24 0852   BP: 126/80   Pulse: 80   Resp: 16   Temp: 98.3 °F (36.8 °C)   SpO2: 99%     Pre medications Tylenol 650mg PO and Bendaryl 25mg PO administered approximately 30 minutes prior to IVIG infusion.    22g IV inserted in patient's LEFT antecubital  x1 attempt.  No blood work due today (order q 6 months, last draw 24).    0.9% NS administered as ordered @ [facility].    Gammunex-C administered over approx 2 hours as ordered with following titrations:  114 mL/hr for approximately 30 minutes  228 mL/hr for approximately 30 minutes  342 mL/hr for approximately 30 minutes  456 mL/hr for remainder of infusion.     Mr. Segura tolerated well without complaints.     VSS. IV removed/ intact.  Gauze/ coban to site.    [facility] in [facility] condition at 1240.  He is to return on 24 at 0900 for his next appointment.    Yoli Ulloa RN  2024

## 2024-06-28 DIAGNOSIS — I10 ESSENTIAL HYPERTENSION: ICD-10-CM

## 2024-06-30 RX ORDER — AMLODIPINE BESYLATE 5 MG/1
5 TABLET ORAL DAILY
Qty: 90 TABLET | Refills: 0 | Status: SHIPPED | OUTPATIENT
Start: 2024-06-30

## 2024-07-25 ENCOUNTER — HOSPITAL ENCOUNTER (OUTPATIENT)
Facility: HOSPITAL | Age: 39
Setting detail: INFUSION SERIES
End: 2024-07-25
Payer: MEDICAID

## 2024-07-25 VITALS
TEMPERATURE: 98.3 F | RESPIRATION RATE: 16 BRPM | HEART RATE: 73 BPM | SYSTOLIC BLOOD PRESSURE: 124 MMHG | OXYGEN SATURATION: 99 % | DIASTOLIC BLOOD PRESSURE: 79 MMHG

## 2024-07-25 DIAGNOSIS — G71.19 ISAAC'S SYNDROME: Primary | ICD-10-CM

## 2024-07-25 PROCEDURE — 96365 THER/PROPH/DIAG IV INF INIT: CPT

## 2024-07-25 PROCEDURE — 96366 THER/PROPH/DIAG IV INF ADDON: CPT

## 2024-07-25 PROCEDURE — 2580000003 HC RX 258: Performed by: PSYCHIATRY & NEUROLOGY

## 2024-07-25 PROCEDURE — 6370000000 HC RX 637 (ALT 250 FOR IP): Performed by: PSYCHIATRY & NEUROLOGY

## 2024-07-25 PROCEDURE — 6360000002 HC RX W HCPCS: Performed by: PSYCHIATRY & NEUROLOGY

## 2024-07-25 RX ORDER — ACETAMINOPHEN 325 MG/1
650 TABLET ORAL
OUTPATIENT
Start: 2024-08-22

## 2024-07-25 RX ORDER — DIPHENHYDRAMINE HYDROCHLORIDE 50 MG/ML
50 INJECTION INTRAMUSCULAR; INTRAVENOUS
OUTPATIENT
Start: 2024-08-22

## 2024-07-25 RX ORDER — SODIUM CHLORIDE 0.9 % (FLUSH) 0.9 %
5-40 SYRINGE (ML) INJECTION PRN
Status: DISCONTINUED | OUTPATIENT
Start: 2024-07-25 | End: 2024-07-29 | Stop reason: HOSPADM

## 2024-07-25 RX ORDER — SODIUM CHLORIDE 9 MG/ML
5-250 INJECTION, SOLUTION INTRAVENOUS PRN
Status: ACTIVE | OUTPATIENT
Start: 2024-07-25 | End: 2024-07-26

## 2024-07-25 RX ORDER — ACETAMINOPHEN 325 MG/1
650 TABLET ORAL ONCE
Status: COMPLETED | OUTPATIENT
Start: 2024-07-25 | End: 2024-07-25

## 2024-07-25 RX ORDER — DIPHENHYDRAMINE HCL 25 MG
25 CAPSULE ORAL ONCE
Status: COMPLETED | OUTPATIENT
Start: 2024-07-25 | End: 2024-07-25

## 2024-07-25 RX ORDER — ACETAMINOPHEN 325 MG/1
650 TABLET ORAL ONCE
OUTPATIENT
Start: 2024-08-22 | End: 2024-08-22

## 2024-07-25 RX ORDER — SODIUM CHLORIDE 0.9 % (FLUSH) 0.9 %
5-40 SYRINGE (ML) INJECTION PRN
Status: ACTIVE | OUTPATIENT
Start: 2024-07-25 | End: 2024-07-26

## 2024-07-25 RX ORDER — DIPHENHYDRAMINE HCL 25 MG
25 CAPSULE ORAL ONCE
OUTPATIENT
Start: 2024-08-22 | End: 2024-08-22

## 2024-07-25 RX ORDER — ALBUTEROL SULFATE 90 UG/1
4 AEROSOL, METERED RESPIRATORY (INHALATION) PRN
OUTPATIENT
Start: 2024-08-22

## 2024-07-25 RX ORDER — HEPARIN 100 UNIT/ML
500 SYRINGE INTRAVENOUS PRN
OUTPATIENT
Start: 2024-08-22

## 2024-07-25 RX ORDER — SODIUM CHLORIDE 9 MG/ML
INJECTION, SOLUTION INTRAVENOUS CONTINUOUS
OUTPATIENT
Start: 2024-08-22

## 2024-07-25 RX ORDER — ONDANSETRON 2 MG/ML
8 INJECTION INTRAMUSCULAR; INTRAVENOUS
OUTPATIENT
Start: 2024-08-22

## 2024-07-25 RX ORDER — SODIUM CHLORIDE 9 MG/ML
5-250 INJECTION, SOLUTION INTRAVENOUS PRN
OUTPATIENT
Start: 2024-08-22

## 2024-07-25 RX ORDER — SODIUM CHLORIDE 0.9 % (FLUSH) 0.9 %
5-40 SYRINGE (ML) INJECTION PRN
OUTPATIENT
Start: 2024-08-22

## 2024-07-25 RX ORDER — EPINEPHRINE 1 MG/ML
0.3 INJECTION, SOLUTION, CONCENTRATE INTRAVENOUS PRN
OUTPATIENT
Start: 2024-08-22

## 2024-07-25 RX ORDER — MEPERIDINE HYDROCHLORIDE 25 MG/ML
12.5 INJECTION INTRAMUSCULAR; INTRAVENOUS; SUBCUTANEOUS PRN
OUTPATIENT
Start: 2024-08-22

## 2024-07-25 RX ADMIN — IMMUNE GLOBULIN (HUMAN) 5 G: 10 INJECTION INTRAVENOUS; SUBCUTANEOUS at 10:25

## 2024-07-25 RX ADMIN — DIPHENHYDRAMINE HYDROCHLORIDE 25 MG: 25 CAPSULE ORAL at 09:45

## 2024-07-25 RX ADMIN — IMMUNE GLOBULIN (HUMAN) 40 G: 10 INJECTION INTRAVENOUS; SUBCUTANEOUS at 11:49

## 2024-07-25 RX ADMIN — ACETAMINOPHEN 650 MG: 325 TABLET ORAL at 09:44

## 2024-07-25 RX ADMIN — SODIUM CHLORIDE 25 ML/HR: 9 INJECTION, SOLUTION INTRAVENOUS at 09:30

## 2024-07-25 RX ADMIN — IMMUNE GLOBULIN (HUMAN) 20 G: 10 INJECTION INTRAVENOUS; SUBCUTANEOUS at 10:56

## 2024-07-25 RX ADMIN — SODIUM CHLORIDE, PRESERVATIVE FREE 10 ML: 5 INJECTION INTRAVENOUS at 09:30

## 2024-07-25 ASSESSMENT — PAIN DESCRIPTION - FREQUENCY
FREQUENCY: CONTINUOUS
FREQUENCY: CONTINUOUS

## 2024-07-25 ASSESSMENT — PAIN DESCRIPTION - LOCATION
LOCATION: FOOT
LOCATION: FOOT

## 2024-07-25 ASSESSMENT — PAIN DESCRIPTION - DESCRIPTORS
DESCRIPTORS: CRAMPING
DESCRIPTORS: CRAMPING

## 2024-07-25 ASSESSMENT — PAIN DESCRIPTION - PAIN TYPE
TYPE: CHRONIC PAIN
TYPE: CHRONIC PAIN

## 2024-07-25 ASSESSMENT — PAIN SCALES - GENERAL
PAINLEVEL_OUTOF10: 2
PAINLEVEL_OUTOF10: 8

## 2024-07-25 ASSESSMENT — PAIN DESCRIPTION - ORIENTATION
ORIENTATION: RIGHT;LEFT
ORIENTATION: RIGHT;LEFT

## 2024-07-25 NOTE — PROGRESS NOTES
[facility] Progress Note    Date: 2024    Name: Gopal Segura Sr.    MRN: [id number]         : 1985      IVIG Q4 weeks       Mr. Segura arrived to [facility] at 0910, ambulatory with assistance of a walking stick, and in stable condition. He C/O severe cramping in both feet (R>L), and states that the only thing that helps this pain is rest. Both feet elevated here in [facility].    Mr. Segura was assessed and education was provided.     Patient reports tolerating last infusion of IVIG well without side effects. 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    Mr. Segura's vitals were reviewed.  Vitals:    24 1325   BP: 124/79   Pulse: 73   Resp: 16   Temp: 98.3 °F (36.8 °C)   SpO2: 99%     Pre medications Tylenol 650mg PO and Bendaryl 25mg PO administered approximately 30 minutes prior to IVIG infusion.    24g IV inserted in patient's LEFT antecubital  on 1st attempt.  No blood work due today (order q 6 months, last draw 24).    250 mL 0.9% NS administered as ordered @ [facility].    Gammunex-C administered over approx 2 hours as ordered with following titrations:  114 mL/hr for approximately 30 minutes  228 mL/hr for approximately 30 minutes  342 mL/hr for approximately 30 minutes  456 mL/hr for remainder of infusion.     Mr. Segura tolerated well without complaints.     VSS. IV removed/ intact.  Gauze/ coban dressing to site.    [facility], ambulatory with assistance of a walking stick and in stable condition at 1335.  He is to return on 24 at 0900 for his next Gamunex-C infusion.    Terese Jauregui RN  2024

## 2024-08-11 ENCOUNTER — HOSPITAL ENCOUNTER (EMERGENCY)
Facility: HOSPITAL | Age: 39
Discharge: HOME | End: 2024-08-11
Attending: EMERGENCY MEDICINE
Payer: MEDICAID

## 2024-08-11 ENCOUNTER — APPOINTMENT (OUTPATIENT)
Facility: HOSPITAL | Age: 39
End: 2024-08-11
Payer: MEDICAID

## 2024-08-11 VITALS
SYSTOLIC BLOOD PRESSURE: 127 MMHG | DIASTOLIC BLOOD PRESSURE: 77 MMHG | OXYGEN SATURATION: 100 % | WEIGHT: 210 LBS | BODY MASS INDEX: 32.96 KG/M2 | RESPIRATION RATE: 16 BRPM | HEART RATE: 84 BPM | HEIGHT: 67 IN | TEMPERATURE: 98.2 F

## 2024-08-11 DIAGNOSIS — R07.9 CHEST PAIN, UNSPECIFIED TYPE: ICD-10-CM

## 2024-08-11 DIAGNOSIS — R52 BODY ACHES: Primary | ICD-10-CM

## 2024-08-11 DIAGNOSIS — N28.9 RENAL INSUFFICIENCY: ICD-10-CM

## 2024-08-11 LAB
ALBUMIN SERPL-MCNC: 5 G/DL (ref 3.4–5)
ALBUMIN/GLOB SERPL: 1 (ref 0.8–1.7)
ALP SERPL-CCNC: 71 U/L (ref 45–117)
ALT SERPL-CCNC: 44 U/L (ref 16–61)
AMORPH CRY URNS QL MICRO: ABNORMAL
ANION GAP SERPL CALC-SCNC: 11 MMOL/L (ref 3–18)
ANION GAP SERPL CALC-SCNC: 6 MMOL/L (ref 3–18)
APPEARANCE UR: ABNORMAL
AST SERPL-CCNC: 44 U/L (ref 10–38)
BACTERIA URNS QL MICRO: NEGATIVE /HPF
BASOPHILS # BLD: 0.1 K/UL (ref 0–0.1)
BASOPHILS NFR BLD: 1 % (ref 0–2)
BILIRUB SERPL-MCNC: 0.7 MG/DL (ref 0.2–1)
BILIRUB UR QL: NEGATIVE
BUN SERPL-MCNC: 17 MG/DL (ref 7–18)
BUN SERPL-MCNC: 18 MG/DL (ref 7–18)
BUN/CREAT SERPL: 7 (ref 12–20)
BUN/CREAT SERPL: 9 (ref 12–20)
CALCIUM SERPL-MCNC: 10.3 MG/DL (ref 8.5–10.1)
CALCIUM SERPL-MCNC: 8.7 MG/DL (ref 8.5–10.1)
CHLORIDE SERPL-SCNC: 105 MMOL/L (ref 100–111)
CHLORIDE SERPL-SCNC: 99 MMOL/L (ref 100–111)
CK SERPL-CCNC: 471 U/L (ref 39–308)
CO2 SERPL-SCNC: 26 MMOL/L (ref 21–32)
CO2 SERPL-SCNC: 28 MMOL/L (ref 21–32)
COLOR UR: ABNORMAL
CREAT SERPL-MCNC: 1.94 MG/DL (ref 0.6–1.3)
CREAT SERPL-MCNC: 2.61 MG/DL (ref 0.6–1.3)
DIFFERENTIAL METHOD BLD: ABNORMAL
EOSINOPHIL # BLD: 0.1 K/UL (ref 0–0.4)
EOSINOPHIL NFR BLD: 1 % (ref 0–5)
EPITH CASTS URNS QL MICRO: ABNORMAL /LPF (ref 0–5)
ERYTHROCYTE [DISTWIDTH] IN BLOOD BY AUTOMATED COUNT: 13.6 % (ref 11.6–14.5)
GLOBULIN SER CALC-MCNC: 4.8 G/DL (ref 2–4)
GLUCOSE SERPL-MCNC: 112 MG/DL (ref 74–99)
GLUCOSE SERPL-MCNC: 94 MG/DL (ref 74–99)
GLUCOSE UR STRIP.AUTO-MCNC: NEGATIVE MG/DL
HCT VFR BLD AUTO: 43.3 % (ref 36–48)
HGB BLD-MCNC: 15.3 G/DL (ref 13–16)
HGB UR QL STRIP: NEGATIVE
HYALINE CASTS URNS QL MICRO: ABNORMAL /LPF (ref 0–2)
IMM GRANULOCYTES # BLD AUTO: 0 K/UL (ref 0–0.04)
IMM GRANULOCYTES NFR BLD AUTO: 0 % (ref 0–0.5)
KETONES UR QL STRIP.AUTO: ABNORMAL MG/DL
LEUKOCYTE ESTERASE UR QL STRIP.AUTO: NEGATIVE
LYMPHOCYTES # BLD: 2.6 K/UL (ref 0.9–3.6)
LYMPHOCYTES NFR BLD: 25 % (ref 21–52)
MCH RBC QN AUTO: 29.9 PG (ref 24–34)
MCHC RBC AUTO-ENTMCNC: 35.3 G/DL (ref 31–37)
MCV RBC AUTO: 84.6 FL (ref 78–100)
MONOCYTES # BLD: 1.2 K/UL (ref 0.05–1.2)
MONOCYTES NFR BLD: 11 % (ref 3–10)
MUCOUS THREADS URNS QL MICRO: ABNORMAL /LPF
NEUTS SEG # BLD: 6.4 K/UL (ref 1.8–8)
NEUTS SEG NFR BLD: 62 % (ref 40–73)
NITRITE UR QL STRIP.AUTO: NEGATIVE
NRBC # BLD: 0 K/UL (ref 0–0.01)
NRBC BLD-RTO: 0 PER 100 WBC
PH UR STRIP: 5.5 (ref 5–8)
PLATELET # BLD AUTO: 359 K/UL (ref 135–420)
PMV BLD AUTO: 9.4 FL (ref 9.2–11.8)
POTASSIUM SERPL-SCNC: 3.6 MMOL/L (ref 3.5–5.5)
POTASSIUM SERPL-SCNC: 3.8 MMOL/L (ref 3.5–5.5)
PROT SERPL-MCNC: 9.8 G/DL (ref 6.4–8.2)
PROT UR STRIP-MCNC: 100 MG/DL
RBC # BLD AUTO: 5.12 M/UL (ref 4.35–5.65)
RBC #/AREA URNS HPF: ABNORMAL /HPF (ref 0–5)
SODIUM SERPL-SCNC: 136 MMOL/L (ref 136–145)
SODIUM SERPL-SCNC: 139 MMOL/L (ref 136–145)
SP GR UR REFRACTOMETRY: 1.03 (ref 1–1.03)
TROPONIN I SERPL HS-MCNC: 43 NG/L (ref 0–78)
TROPONIN I SERPL HS-MCNC: 50 NG/L (ref 0–78)
UROBILINOGEN UR QL STRIP.AUTO: 1 EU/DL (ref 0.2–1)
WBC # BLD AUTO: 10.3 K/UL (ref 4.6–13.2)
WBC URNS QL MICRO: ABNORMAL /HPF (ref 0–4)

## 2024-08-11 PROCEDURE — 81001 URINALYSIS AUTO W/SCOPE: CPT

## 2024-08-11 PROCEDURE — 99284 EMERGENCY DEPT VISIT MOD MDM: CPT

## 2024-08-11 PROCEDURE — 85025 COMPLETE CBC W/AUTO DIFF WBC: CPT

## 2024-08-11 PROCEDURE — 2580000003 HC RX 258: Performed by: EMERGENCY MEDICINE

## 2024-08-11 PROCEDURE — 96374 THER/PROPH/DIAG INJ IV PUSH: CPT

## 2024-08-11 PROCEDURE — 93005 ELECTROCARDIOGRAM TRACING: CPT | Performed by: EMERGENCY MEDICINE

## 2024-08-11 PROCEDURE — 6360000002 HC RX W HCPCS: Performed by: EMERGENCY MEDICINE

## 2024-08-11 PROCEDURE — 82550 ASSAY OF CK (CPK): CPT

## 2024-08-11 PROCEDURE — 80053 COMPREHEN METABOLIC PANEL: CPT

## 2024-08-11 PROCEDURE — 96361 HYDRATE IV INFUSION ADD-ON: CPT

## 2024-08-11 PROCEDURE — 84484 ASSAY OF TROPONIN QUANT: CPT

## 2024-08-11 PROCEDURE — 71045 X-RAY EXAM CHEST 1 VIEW: CPT

## 2024-08-11 RX ORDER — ONDANSETRON 2 MG/ML
4 INJECTION INTRAMUSCULAR; INTRAVENOUS
Status: COMPLETED | OUTPATIENT
Start: 2024-08-11 | End: 2024-08-11

## 2024-08-11 RX ORDER — 0.9 % SODIUM CHLORIDE 0.9 %
1000 INTRAVENOUS SOLUTION INTRAVENOUS ONCE
Status: COMPLETED | OUTPATIENT
Start: 2024-08-11 | End: 2024-08-11

## 2024-08-11 RX ADMIN — SODIUM CHLORIDE 1000 ML: 9 INJECTION, SOLUTION INTRAVENOUS at 19:43

## 2024-08-11 RX ADMIN — ONDANSETRON 4 MG: 2 INJECTION INTRAMUSCULAR; INTRAVENOUS at 18:58

## 2024-08-11 RX ADMIN — SODIUM CHLORIDE 1000 ML: 9 INJECTION, SOLUTION INTRAVENOUS at 21:59

## 2024-08-11 RX ADMIN — SODIUM CHLORIDE 1000 ML: 9 INJECTION, SOLUTION INTRAVENOUS at 18:57

## 2024-08-11 ASSESSMENT — PAIN SCALES - GENERAL
PAINLEVEL_OUTOF10: 5
PAINLEVEL_OUTOF10: 10

## 2024-08-11 ASSESSMENT — PAIN DESCRIPTION - ONSET: ONSET: AWAKENED FROM SLEEP

## 2024-08-11 ASSESSMENT — PAIN DESCRIPTION - ORIENTATION: ORIENTATION: LEFT

## 2024-08-11 ASSESSMENT — PAIN DESCRIPTION - PAIN TYPE: TYPE: ACUTE PAIN

## 2024-08-11 ASSESSMENT — PAIN - FUNCTIONAL ASSESSMENT
PAIN_FUNCTIONAL_ASSESSMENT: 0-10
PAIN_FUNCTIONAL_ASSESSMENT: 0-10

## 2024-08-11 ASSESSMENT — PAIN DESCRIPTION - FREQUENCY: FREQUENCY: INTERMITTENT

## 2024-08-11 ASSESSMENT — PAIN DESCRIPTION - LOCATION
LOCATION: CHEST
LOCATION: CHEST

## 2024-08-11 ASSESSMENT — PAIN DESCRIPTION - DESCRIPTORS
DESCRIPTORS: ACHING
DESCRIPTORS: ACHING

## 2024-08-11 ASSESSMENT — LIFESTYLE VARIABLES
HOW OFTEN DO YOU HAVE A DRINK CONTAINING ALCOHOL: NEVER
HOW MANY STANDARD DRINKS CONTAINING ALCOHOL DO YOU HAVE ON A TYPICAL DAY: PATIENT DOES NOT DRINK

## 2024-08-11 NOTE — ED TRIAGE NOTES
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 EKG performed in triage .

## 2024-08-11 NOTE — ED NOTES
1900 myalgi, h/o arf w jorge alberto syndrome    Ck and labs pending    1955 currently feeling better, no current complaints  2300 pt feels much better, req dc per dr Kinsey sign out plan.  Declines further ed eval or tx  neg trop x 2, cr improved but still elevated.       Mckay Spangler MD  08/12/24 7977

## 2024-08-11 NOTE — ED PROVIDER NOTES
range)   ondansetron (ZOFRAN) injection 4 mg (has no administration in time range)       CONSULTS: (Who and What was discussed)  None    Chronic Conditions: Mack syndrome    Social Determinants affecting Dx or Tx: None    Records Reviewed (source and summary of external notes): Nursing Notes    Procedures    Critical Care Time: 0    SCREENING TOOLS:  None    CLINICAL MANAGEMENT TOOLS:  Not Applicable      Diagnosis     Clinical Impression:   1. Body aches         Disposition: turned over to night doctor awaiting results    Current Discharge Medication List           No follow-up provider specified.     Roxann Siegel DO  Disclaimer: Sections of this note are dictated using utilizing voice recognition software.  Minor typographical errors may be present. If questions arise, please do not hesitate to contact me or call our department.         Roxann Siegel,   08/11/24 1199

## 2024-08-12 LAB
EKG ATRIAL RATE: 98 BPM
EKG DIAGNOSIS: NORMAL
EKG P AXIS: 41 DEGREES
EKG P-R INTERVAL: 142 MS
EKG Q-T INTERVAL: 328 MS
EKG QRS DURATION: 74 MS
EKG QTC CALCULATION (BAZETT): 418 MS
EKG R AXIS: 10 DEGREES
EKG T AXIS: 42 DEGREES
EKG VENTRICULAR RATE: 98 BPM

## 2024-08-12 PROCEDURE — 93010 ELECTROCARDIOGRAM REPORT: CPT | Performed by: INTERNAL MEDICINE

## 2024-08-12 NOTE — DISCHARGE INSTRUCTIONS
Return for pain, fever not resolving with motrin or tylenol, shortness of breath, vomiting, decreased fluid intake, weakness, numbness, dizziness, or any change or concerns.    Your kidney function test, creatinine, was 1.9 and will need to be followed up closely.

## 2024-08-12 NOTE — ED NOTES
Patient discharged at this time. This RN reviewed discharge instructions at this time with the patient.  patient verbalized understanding and does not have any questions. Pt ambulatory upon discharge, & in stable condition. Pt armband removed & shredded. Patient I.V was discharged. Patient is ambulatory at discharged.

## 2024-08-12 NOTE — ED NOTES
Patient resting on stretcher, NAD noted, call bell in reach, bed low and locked with side rails up x 2; Reports pain 5/10. Updated on plan of care, patient verbalizes understanding. This nurse will continue to monitor.

## 2024-08-20 DIAGNOSIS — T78.40XD ALLERGIC REACTION, SUBSEQUENT ENCOUNTER: ICD-10-CM

## 2024-08-20 DIAGNOSIS — I10 ESSENTIAL HYPERTENSION: ICD-10-CM

## 2024-08-21 RX ORDER — BUPROPION HYDROCHLORIDE 150 MG/1
150 TABLET, EXTENDED RELEASE ORAL 2 TIMES DAILY
Qty: 60 TABLET | Refills: 0 | Status: SHIPPED | OUTPATIENT
Start: 2024-08-21

## 2024-08-21 RX ORDER — AMLODIPINE BESYLATE 5 MG/1
5 TABLET ORAL DAILY
Qty: 30 TABLET | Refills: 0 | Status: SHIPPED | OUTPATIENT
Start: 2024-08-21

## 2024-08-21 RX ORDER — CETIRIZINE HYDROCHLORIDE 10 MG/1
10 TABLET ORAL DAILY
Qty: 30 TABLET | Refills: 0 | Status: SHIPPED | OUTPATIENT
Start: 2024-08-21

## 2024-08-22 ENCOUNTER — HOSPITAL ENCOUNTER (OUTPATIENT)
Facility: HOSPITAL | Age: 39
Setting detail: INFUSION SERIES
End: 2024-08-22

## 2024-10-10 DIAGNOSIS — T78.40XD ALLERGIC REACTION, SUBSEQUENT ENCOUNTER: ICD-10-CM

## 2024-10-10 DIAGNOSIS — I10 ESSENTIAL HYPERTENSION: ICD-10-CM

## 2024-10-11 RX ORDER — AMLODIPINE BESYLATE 5 MG/1
5 TABLET ORAL DAILY
Qty: 30 TABLET | Refills: 0 | Status: SHIPPED | OUTPATIENT
Start: 2024-10-11

## 2024-10-11 RX ORDER — BUPROPION HYDROCHLORIDE 150 MG/1
150 TABLET, EXTENDED RELEASE ORAL 2 TIMES DAILY
Qty: 60 TABLET | Refills: 0 | Status: SHIPPED | OUTPATIENT
Start: 2024-10-11

## 2024-10-11 RX ORDER — HYOSCYAMINE SULFATE 0.38 MG/1
0.38 TABLET, EXTENDED RELEASE ORAL EVERY 12 HOURS PRN
Qty: 60 TABLET | Refills: 0 | Status: SHIPPED | OUTPATIENT
Start: 2024-10-11

## 2024-10-11 RX ORDER — FLUTICASONE PROPIONATE 50 MCG
2 SPRAY, SUSPENSION (ML) NASAL DAILY
Qty: 16 G | Refills: 0 | Status: SHIPPED | OUTPATIENT
Start: 2024-10-11

## 2024-10-11 RX ORDER — ESOMEPRAZOLE MAGNESIUM 40 MG/1
40 CAPSULE, DELAYED RELEASE ORAL DAILY
Qty: 30 CAPSULE | Refills: 0 | Status: SHIPPED | OUTPATIENT
Start: 2024-10-11

## 2024-10-11 RX ORDER — CETIRIZINE HYDROCHLORIDE 10 MG/1
10 TABLET ORAL DAILY
Qty: 30 TABLET | Refills: 0 | Status: SHIPPED | OUTPATIENT
Start: 2024-10-11

## 2025-01-04 DIAGNOSIS — I10 ESSENTIAL HYPERTENSION: ICD-10-CM

## 2025-01-05 RX ORDER — HYOSCYAMINE SULFATE 0.38 MG/1
TABLET, EXTENDED RELEASE ORAL
Qty: 60 TABLET | Refills: 0 | OUTPATIENT
Start: 2025-01-05

## 2025-01-05 RX ORDER — AMLODIPINE BESYLATE 5 MG/1
5 TABLET ORAL DAILY
Qty: 30 TABLET | Refills: 0 | OUTPATIENT
Start: 2025-01-05

## 2025-01-05 RX ORDER — BUPROPION HYDROCHLORIDE 150 MG/1
150 TABLET, FILM COATED, EXTENDED RELEASE ORAL 2 TIMES DAILY
Qty: 60 TABLET | OUTPATIENT
Start: 2025-01-05

## 2025-01-12 ENCOUNTER — APPOINTMENT (OUTPATIENT)
Facility: HOSPITAL | Age: 40
End: 2025-01-12

## 2025-01-12 ENCOUNTER — HOSPITAL ENCOUNTER (EMERGENCY)
Facility: HOSPITAL | Age: 40
Discharge: HOME | End: 2025-01-12
Attending: STUDENT IN AN ORGANIZED HEALTH CARE EDUCATION/TRAINING PROGRAM

## 2025-01-12 VITALS
BODY MASS INDEX: 32.96 KG/M2 | SYSTOLIC BLOOD PRESSURE: 136 MMHG | WEIGHT: 210 LBS | HEIGHT: 67 IN | RESPIRATION RATE: 17 BRPM | OXYGEN SATURATION: 97 % | TEMPERATURE: 98.2 F | DIASTOLIC BLOOD PRESSURE: 92 MMHG | HEART RATE: 81 BPM

## 2025-01-12 DIAGNOSIS — I10 HYPERTENSION, UNSPECIFIED TYPE: Primary | ICD-10-CM

## 2025-01-12 DIAGNOSIS — M79.602 LEFT ARM PAIN: ICD-10-CM

## 2025-01-12 LAB
ANION GAP SERPL CALC-SCNC: 3 MMOL/L (ref 3–18)
BASOPHILS # BLD: 0.07 K/UL (ref 0–0.1)
BASOPHILS NFR BLD: 0.8 % (ref 0–2)
BUN SERPL-MCNC: 8 MG/DL (ref 7–18)
BUN/CREAT SERPL: 8 (ref 12–20)
CALCIUM SERPL-MCNC: 9.4 MG/DL (ref 8.5–10.1)
CHLORIDE SERPL-SCNC: 106 MMOL/L (ref 100–111)
CO2 SERPL-SCNC: 31 MMOL/L (ref 21–32)
CREAT SERPL-MCNC: 1.05 MG/DL (ref 0.6–1.3)
DIFFERENTIAL METHOD BLD: ABNORMAL
EKG ATRIAL RATE: 82 BPM
EKG DIAGNOSIS: NORMAL
EKG P AXIS: 44 DEGREES
EKG P-R INTERVAL: 186 MS
EKG Q-T INTERVAL: 342 MS
EKG QRS DURATION: 88 MS
EKG QTC CALCULATION (BAZETT): 399 MS
EKG R AXIS: 15 DEGREES
EKG T AXIS: 15 DEGREES
EKG VENTRICULAR RATE: 82 BPM
EOSINOPHIL # BLD: 0.2 K/UL (ref 0–0.4)
EOSINOPHIL NFR BLD: 2.4 % (ref 0–5)
ERYTHROCYTE [DISTWIDTH] IN BLOOD BY AUTOMATED COUNT: 13.2 % (ref 11.6–14.5)
GLUCOSE BLD STRIP.AUTO-MCNC: 94 MG/DL (ref 70–110)
GLUCOSE SERPL-MCNC: 97 MG/DL (ref 74–99)
HCT VFR BLD AUTO: 41.5 % (ref 36–48)
HGB BLD-MCNC: 14.4 G/DL (ref 13–16)
IMM GRANULOCYTES # BLD AUTO: 0.01 K/UL (ref 0–0.04)
IMM GRANULOCYTES NFR BLD AUTO: 0.1 % (ref 0–0.5)
LYMPHOCYTES # BLD: 4.17 K/UL (ref 0.9–3.6)
LYMPHOCYTES NFR BLD: 49.2 % (ref 21–52)
MCH RBC QN AUTO: 29.7 PG (ref 24–34)
MCHC RBC AUTO-ENTMCNC: 34.7 G/DL (ref 31–37)
MCV RBC AUTO: 85.6 FL (ref 78–100)
MONOCYTES # BLD: 0.48 K/UL (ref 0.05–1.2)
MONOCYTES NFR BLD: 5.7 % (ref 3–10)
NEUTS SEG # BLD: 3.55 K/UL (ref 1.8–8)
NEUTS SEG NFR BLD: 41.8 % (ref 40–73)
NRBC # BLD: 0 K/UL (ref 0–0.01)
NRBC BLD-RTO: 0 PER 100 WBC
PLATELET # BLD AUTO: 357 K/UL (ref 135–420)
PMV BLD AUTO: 9.3 FL (ref 9.2–11.8)
POTASSIUM SERPL-SCNC: 3.9 MMOL/L (ref 3.5–5.5)
RBC # BLD AUTO: 4.85 M/UL (ref 4.35–5.65)
SODIUM SERPL-SCNC: 140 MMOL/L (ref 136–145)
TROPONIN I SERPL HS-MCNC: 8 NG/L (ref 0–78)
WBC # BLD AUTO: 8.5 K/UL (ref 4.6–13.2)

## 2025-01-12 PROCEDURE — 71045 X-RAY EXAM CHEST 1 VIEW: CPT

## 2025-01-12 PROCEDURE — 85025 COMPLETE CBC W/AUTO DIFF WBC: CPT

## 2025-01-12 PROCEDURE — 82962 GLUCOSE BLOOD TEST: CPT

## 2025-01-12 PROCEDURE — 93010 ELECTROCARDIOGRAM REPORT: CPT | Performed by: INTERNAL MEDICINE

## 2025-01-12 PROCEDURE — 99285 EMERGENCY DEPT VISIT HI MDM: CPT

## 2025-01-12 PROCEDURE — 80048 BASIC METABOLIC PNL TOTAL CA: CPT

## 2025-01-12 PROCEDURE — 84484 ASSAY OF TROPONIN QUANT: CPT

## 2025-01-12 PROCEDURE — 93005 ELECTROCARDIOGRAM TRACING: CPT | Performed by: STUDENT IN AN ORGANIZED HEALTH CARE EDUCATION/TRAINING PROGRAM

## 2025-01-12 ASSESSMENT — LIFESTYLE VARIABLES
HOW MANY STANDARD DRINKS CONTAINING ALCOHOL DO YOU HAVE ON A TYPICAL DAY: PATIENT DOES NOT DRINK
HOW OFTEN DO YOU HAVE A DRINK CONTAINING ALCOHOL: NEVER

## 2025-01-12 ASSESSMENT — PAIN DESCRIPTION - ORIENTATION: ORIENTATION: LEFT

## 2025-01-12 ASSESSMENT — PAIN DESCRIPTION - DESCRIPTORS: DESCRIPTORS: TINGLING

## 2025-01-12 ASSESSMENT — PAIN SCALES - GENERAL: PAINLEVEL_OUTOF10: 8

## 2025-01-12 ASSESSMENT — PAIN - FUNCTIONAL ASSESSMENT: PAIN_FUNCTIONAL_ASSESSMENT: 0-10

## 2025-01-12 ASSESSMENT — PAIN DESCRIPTION - LOCATION: LOCATION: ARM

## 2025-01-12 NOTE — DISCHARGE INSTRUCTIONS
Continue your blood pressure medications as prescribed.  Follow-up with your primary care doctor to see if you need any adjustments made to the medications.  Take Tylenol and Motrin as needed for your arm pain.  Return to the emergency department for any new or worsening symptoms.

## 2025-01-12 NOTE — ED TRIAGE NOTES
Pt to ED for eval of hypertension. Pt reports having tingling to LUE since mid day yesterday. Provider notified and at bedside. No Code S at this time. POC glucose 94, NIHSS 0.

## 2025-01-12 NOTE — ED PROVIDER NOTES
daily      montelukast (SINGULAIR) 10 MG tablet Take 1 tablet by mouth daily         Past History     Past Medical History:  Past Medical History:   Diagnosis Date    Acute gastritis without hemorrhage 4/21/2021    Alcohol abuse 4/21/2021    Anxiety and depression 4/21/2021    Class 1 obesity due to excess calories with serious comorbidity and body mass index (BMI) of 32.0 to 32.9 in adult 4/21/2021    Constipation 4/8/2021    Essential hypertension 4/21/2021    GERD (gastroesophageal reflux disease)     Impingement syndrome of left shoulder 4/21/2021    Irritable bowel syndrome 6/25/2018    Irritable bowel syndrome     Non-traumatic rhabdomyolysis 12/24/2020       Past Surgical History:  Past Surgical History:   Procedure Laterality Date    COLONOSCOPY N/A 05/22/2018    COLONOSCOPY performed by Yeyo Messina MD at [facility] ENDOSCOPY    ENDOSCOPY VISIT OUTPATIENT  2012    OTHER SURGICAL HISTORY      biopsy taken of left arm infection/    WISDOM TOOTH EXTRACTION  06/13/2022       Family History:  Family History   Problem Relation Age of Onset    Prostate Cancer Father     Cancer Father     Liver Disease Father     Hypertension Mother     Heart Disease Mother     Diabetes Mother        Social History:  Social History     Tobacco Use    Smoking status: Every Day    Smokeless tobacco: Never   Substance Use Topics    Alcohol use: Not Currently    Drug use: Not Currently     Types: Prescription, Marijuana (Weed), Cocaine       Allergies:  Allergies   Allergen Reactions    Egg-Derived Products Anaphylaxis    Milk (Cow) Other (See Comments)     GI issues    Onion Hives    Peanut-Containing Drug Products Hives and Other (See Comments)     GI issues      Tomato Hives    Nsaids Other (See Comments)     H/o ulcers         Review of Systems       Review of Systems   Constitutional:  Negative for activity change, appetite change, fatigue and fever.   HENT:  Negative for congestion and sore throat.    Respiratory:  Negative for

## 2025-01-12 NOTE — ED NOTES
Patient discharged before getting discharge paperwork. Contacted patient x3 regarding IV. No answer.

## (undated) DEVICE — 1200 GUARD II KIT W/5MM TUBE W/O VAC TUBE: Brand: GUARDIAN

## (undated) DEVICE — CATH IV AUTOGRD BC BLU 22GA 25 -- INSYTE

## (undated) DEVICE — CUFF RMFG BP INF SZ 11 DISP -- LAWSON OEM ITEM 238915

## (undated) DEVICE — SET ADMIN 16ML TBNG L100IN 2 Y INJ SITE IV PIGGY BK DISP

## (undated) DEVICE — WASH CLOTH INCONT LO LINT PREM 12X13 IN LF DISP

## (undated) DEVICE — SYR 3ML LL TIP 1/10ML GRAD --

## (undated) DEVICE — STERILE POLYISOPRENE POWDER-FREE SURGICAL GLOVES: Brand: PROTEXIS

## (undated) DEVICE — NDL PRT INJ NSAF BLNT 18GX1.5 --

## (undated) DEVICE — SOLIDIFIER MEDC 1200ML -- CONVERT TO 356117

## (undated) DEVICE — 1200CC GUARDIAN II: Brand: GUARDIAN

## (undated) DEVICE — SOL IRR STRL H2O 1000ML BTL --

## (undated) DEVICE — SYR 5ML 1/5 GRAD LL NSAF LF --

## (undated) DEVICE — SPONGE GZ W4XL4IN COT 12 PLY TYP VII WVN C FLD DSGN

## (undated) DEVICE — SYRINGE 50ML E/T

## (undated) DEVICE — FCPS RAD JAW 4 SC 240CM W/NDL --

## (undated) DEVICE — PAD,PREPPING,CUFFED,24X48,7",NONSTERILE: Brand: MEDLINE

## (undated) DEVICE — BAG BELONG PT PERS CLEAR HANDL

## (undated) DEVICE — Device

## (undated) DEVICE — KIT COLON W/ 1.1OZ LUB AND 2 END

## (undated) DEVICE — JELLY,LUBE,STERILE,FLIP TOP,TUBE,4-OZ: Brand: MEDLINE

## (undated) DEVICE — TUBING, SUCTION, 9/32" X 10', STRAIGHT: Brand: MEDLINE

## (undated) DEVICE — ADULT SPO2 SENSOR: Brand: NELLCOR

## (undated) DEVICE — KENDALL RADIOLUCENT FOAM MONITORING ELECTRODE -RECTANGULAR SHAPE: Brand: KENDALL

## (undated) DEVICE — NDL FLTR TIP 5 MIC 18GX1.5IN --

## (undated) DEVICE — BASIN EMSIS 16OZ GRAPHITE PLAS KID SHP MOLD GRAD FOR ORAL